# Patient Record
Sex: FEMALE | Race: WHITE | Employment: UNEMPLOYED | URBAN - NONMETROPOLITAN AREA
[De-identification: names, ages, dates, MRNs, and addresses within clinical notes are randomized per-mention and may not be internally consistent; named-entity substitution may affect disease eponyms.]

---

## 2017-12-07 ENCOUNTER — OFFICE VISIT (OUTPATIENT)
Dept: FAMILY MEDICINE CLINIC | Age: 72
End: 2017-12-07

## 2017-12-07 VITALS
BODY MASS INDEX: 23.55 KG/M2 | OXYGEN SATURATION: 92 % | WEIGHT: 128 LBS | HEIGHT: 62 IN | DIASTOLIC BLOOD PRESSURE: 86 MMHG | HEART RATE: 69 BPM | SYSTOLIC BLOOD PRESSURE: 126 MMHG

## 2017-12-07 DIAGNOSIS — E55.9 VITAMIN D DEFICIENCY: ICD-10-CM

## 2017-12-07 DIAGNOSIS — K86.89 PANCREATIC INSUFFICIENCY: ICD-10-CM

## 2017-12-07 DIAGNOSIS — G45.9 TRANSIENT CEREBRAL ISCHEMIA, UNSPECIFIED TYPE: ICD-10-CM

## 2017-12-07 DIAGNOSIS — Z76.0 MEDICATION REFILL: ICD-10-CM

## 2017-12-07 DIAGNOSIS — E53.8 B12 DEFICIENCY: ICD-10-CM

## 2017-12-07 DIAGNOSIS — I10 ESSENTIAL HYPERTENSION: Primary | ICD-10-CM

## 2017-12-07 DIAGNOSIS — K21.9 GASTROESOPHAGEAL REFLUX DISEASE, ESOPHAGITIS PRESENCE NOT SPECIFIED: ICD-10-CM

## 2017-12-07 DIAGNOSIS — H34.8392 BRANCH RETINAL VEIN OCCLUSION: ICD-10-CM

## 2017-12-07 DIAGNOSIS — J44.9 CHRONIC OBSTRUCTIVE PULMONARY DISEASE, UNSPECIFIED COPD TYPE (HCC): ICD-10-CM

## 2017-12-07 DIAGNOSIS — Z23 NEEDS FLU SHOT: ICD-10-CM

## 2017-12-07 DIAGNOSIS — F41.9 ANXIETY: ICD-10-CM

## 2017-12-07 DIAGNOSIS — Z11.59 ENCOUNTER FOR HEPATITIS C SCREENING TEST FOR LOW RISK PATIENT: ICD-10-CM

## 2017-12-07 PROCEDURE — 3014F SCREEN MAMMO DOC REV: CPT | Performed by: FAMILY MEDICINE

## 2017-12-07 PROCEDURE — G8400 PT W/DXA NO RESULTS DOC: HCPCS | Performed by: FAMILY MEDICINE

## 2017-12-07 PROCEDURE — G8484 FLU IMMUNIZE NO ADMIN: HCPCS | Performed by: FAMILY MEDICINE

## 2017-12-07 PROCEDURE — G8510 SCR DEP NEG, NO PLAN REQD: HCPCS | Performed by: FAMILY MEDICINE

## 2017-12-07 PROCEDURE — G8926 SPIRO NO PERF OR DOC: HCPCS | Performed by: FAMILY MEDICINE

## 2017-12-07 PROCEDURE — 4040F PNEUMOC VAC/ADMIN/RCVD: CPT | Performed by: FAMILY MEDICINE

## 2017-12-07 PROCEDURE — 3023F SPIROM DOC REV: CPT | Performed by: FAMILY MEDICINE

## 2017-12-07 PROCEDURE — 1123F ACP DISCUSS/DSCN MKR DOCD: CPT | Performed by: FAMILY MEDICINE

## 2017-12-07 PROCEDURE — G8427 DOCREV CUR MEDS BY ELIG CLIN: HCPCS | Performed by: FAMILY MEDICINE

## 2017-12-07 PROCEDURE — 3017F COLORECTAL CA SCREEN DOC REV: CPT | Performed by: FAMILY MEDICINE

## 2017-12-07 PROCEDURE — 1090F PRES/ABSN URINE INCON ASSESS: CPT | Performed by: FAMILY MEDICINE

## 2017-12-07 PROCEDURE — 4004F PT TOBACCO SCREEN RCVD TLK: CPT | Performed by: FAMILY MEDICINE

## 2017-12-07 PROCEDURE — 99214 OFFICE O/P EST MOD 30 MIN: CPT | Performed by: FAMILY MEDICINE

## 2017-12-07 PROCEDURE — 3288F FALL RISK ASSESSMENT DOCD: CPT | Performed by: FAMILY MEDICINE

## 2017-12-07 PROCEDURE — 90662 IIV NO PRSV INCREASED AG IM: CPT | Performed by: FAMILY MEDICINE

## 2017-12-07 PROCEDURE — G8420 CALC BMI NORM PARAMETERS: HCPCS | Performed by: FAMILY MEDICINE

## 2017-12-07 PROCEDURE — G0008 ADMIN INFLUENZA VIRUS VAC: HCPCS | Performed by: FAMILY MEDICINE

## 2017-12-07 PROCEDURE — 96372 THER/PROPH/DIAG INJ SC/IM: CPT | Performed by: FAMILY MEDICINE

## 2017-12-07 RX ORDER — ALBUTEROL SULFATE 90 UG/1
2 AEROSOL, METERED RESPIRATORY (INHALATION) EVERY 6 HOURS PRN
Qty: 3 INHALER | Refills: 3 | Status: SHIPPED | OUTPATIENT
Start: 2017-12-07 | End: 2018-10-25 | Stop reason: SDUPTHER

## 2017-12-07 RX ORDER — PANTOPRAZOLE SODIUM 40 MG/1
40 TABLET, DELAYED RELEASE ORAL DAILY
Qty: 90 TABLET | Refills: 3 | Status: SHIPPED | OUTPATIENT
Start: 2017-12-07 | End: 2018-10-25 | Stop reason: SDUPTHER

## 2017-12-07 RX ORDER — ALBUTEROL SULFATE 2.5 MG/3ML
2.5 SOLUTION RESPIRATORY (INHALATION) EVERY 4 HOURS PRN
Qty: 120 EACH | Refills: 3 | Status: SHIPPED | OUTPATIENT
Start: 2017-12-07 | End: 2018-10-25 | Stop reason: SDUPTHER

## 2017-12-07 RX ORDER — CLOPIDOGREL BISULFATE 75 MG/1
75 TABLET ORAL DAILY
Qty: 90 TABLET | Refills: 3 | Status: SHIPPED | OUTPATIENT
Start: 2017-12-07 | End: 2018-10-25 | Stop reason: SDUPTHER

## 2017-12-07 RX ORDER — PROPRANOLOL HYDROCHLORIDE 40 MG/1
40 TABLET ORAL 2 TIMES DAILY
Qty: 180 TABLET | Refills: 3 | Status: SHIPPED | OUTPATIENT
Start: 2017-12-07 | End: 2018-10-25 | Stop reason: SDUPTHER

## 2017-12-07 RX ORDER — CYANOCOBALAMIN 1000 UG/ML
1000 INJECTION INTRAMUSCULAR; SUBCUTANEOUS ONCE
Status: COMPLETED | OUTPATIENT
Start: 2017-12-07 | End: 2017-12-07

## 2017-12-07 RX ORDER — SERTRALINE HYDROCHLORIDE 25 MG/1
25 TABLET, FILM COATED ORAL DAILY
Qty: 90 TABLET | Refills: 3 | Status: SHIPPED | OUTPATIENT
Start: 2017-12-07 | End: 2018-10-25 | Stop reason: SDUPTHER

## 2017-12-07 RX ADMIN — CYANOCOBALAMIN 1000 MCG: 1000 INJECTION INTRAMUSCULAR; SUBCUTANEOUS at 11:17

## 2017-12-07 ASSESSMENT — PATIENT HEALTH QUESTIONNAIRE - PHQ9
2. FEELING DOWN, DEPRESSED OR HOPELESS: 1
SUM OF ALL RESPONSES TO PHQ QUESTIONS 1-9: 1
1. LITTLE INTEREST OR PLEASURE IN DOING THINGS: 0
SUM OF ALL RESPONSES TO PHQ9 QUESTIONS 1 & 2: 1

## 2017-12-07 NOTE — PROGRESS NOTES
Chief Complaint   Patient presents with    Establish Care       HPI:  Claudean Cockayne is a 71 y.o. female     Establish  Problem list as below  Last seen about a year ago with Dr. Kirby Mike  Requests flu shot    Compliant with meds for HTN    GERD, on protonix    COPD, uses albuterol    plavix for reported hx of TIA    She had ileojejunal bypass in 1978  She drinks ensure    Not eating all that well    Has lost 30 pounds since last year    Patient Active Problem List   Diagnosis    HTN (hypertension)    Branch retinal vein occlusion    Cortical senile cataract    Nonexudative age-related macular degeneration    Nuclear senile cataract    GERD (gastroesophageal reflux disease)    Chronic obstructive pulmonary disease ([facility] [address]    H/O chronic pancreatitis       Current Outpatient Prescriptions   Medication Sig Dispense Refill    pantoprazole (PROTONIX) 40 MG tablet Take 1 tablet by mouth daily 90 tablet 3    clopidogrel (PLAVIX) 75 MG tablet Take 1 tablet by mouth daily 90 tablet 3    propranolol (INDERAL) 40 MG tablet Take 1 tablet by mouth 2 times daily 180 tablet 3    lipase-protease-amylase (CREON) 51661-83156 units delayed release capsule Take 1 capsule by mouth 2 times daily 180 capsule 3    albuterol (PROVENTIL) (2.5 MG/3ML) 0.083% nebulizer solution Take 3 mLs by nebulization every 4 hours as needed for Wheezing 120 each 3    albuterol sulfate HFA (VENTOLIN HFA) 108 (90 Base) MCG/ACT inhaler Inhale 2 puffs into the lungs every 6 hours as needed for Wheezing 3 Inhaler 3    sertraline (ZOLOFT) 25 MG tablet Take 1 tablet by mouth daily 90 tablet 3    Magnesium Oxide 400 MG CAPS Take 400 mg by mouth 2 times daily 180 capsule 3     Current Facility-Administered Medications   Medication Dose Route Frequency Provider Last Rate Last Dose    cyanocobalamin injection 1,000 mcg  1,000 mcg Intramuscular Once Sherri Diana MD             Past Medical History:   Diagnosis Date    Bowel disease     Cataract     Chronic Oriented x 3   HEENT: EOMI, eyes clear, MMM  Skin: without rash or jaundice  Neck: no significant lymphadenopathy or thyromegaly  Lungs: CTA B w/out Rales/Wheezes/Rhonchi, Good respiratory effort   Heart: RRR, S1S2, w/out M/R/G, non-displaced PMI   Abdomen: Soft NT/ND, w/out R/G, w/ +BSx4   Ext: No C/C/E Bilaterally. Neuro: Neurovascularly intact w/ Sensory/Motor intact UE/LE Bilaterally. Lab Results   Component Value Date    WBC 7.9 11/11/2014    HGB 13.9 11/11/2014    HCT 42.2 11/11/2014     11/11/2014    CHOL 137 10/04/2014    TRIG 249 (H) 10/04/2014    HDL 27 (L) 10/04/2014    ALT 23 11/11/2014    AST 20 11/11/2014     05/23/2016    K 4.4 05/23/2016     05/23/2016    CREATININE 0.86 05/23/2016    BUN 23 05/23/2016    CO2 19 (L) 05/23/2016    TSH 2.520 05/23/2016    INR 1.1 10/04/2014         A&P  1. Essential hypertension  CBC    Comprehensive Metabolic Panel    Lipid Panel    propranolol (INDERAL) 40 MG tablet   2. Chronic obstructive pulmonary disease, unspecified COPD type (HCC)  albuterol (PROVENTIL) (2.5 MG/3ML) 0.083% nebulizer solution    albuterol sulfate HFA (VENTOLIN HFA) 108 (90 Base) MCG/ACT inhaler   3. Needs flu shot  INFLUENZA, HIGH DOSE, 65 YRS +, IM, PF, PREFILL SYR, 0.5ML (FLUZONE HD)   4. Gastroesophageal reflux disease, esophagitis presence not specified  pantoprazole (PROTONIX) 40 MG tablet   5. Branch retinal vein occlusion     6. Medication refill  pantoprazole (PROTONIX) 40 MG tablet    clopidogrel (PLAVIX) 75 MG tablet    propranolol (INDERAL) 40 MG tablet   7. Anxiety  sertraline (ZOLOFT) 25 MG tablet    TSH without Reflex   8. Encounter for hepatitis C screening test for low risk patient  Hepatitis C Antibody   9. Pancreatic insufficiency  lipase-protease-amylase (CREON) 94233-25922 units delayed release capsule   10. Transient cerebral ischemia, unspecified type  clopidogrel (PLAVIX) 75 MG tablet   11. Vitamin D deficiency  Vitamin D 25 Hydroxy   12.  B12

## 2017-12-08 DIAGNOSIS — F41.9 ANXIETY: ICD-10-CM

## 2017-12-08 DIAGNOSIS — Z11.59 ENCOUNTER FOR HEPATITIS C SCREENING TEST FOR LOW RISK PATIENT: ICD-10-CM

## 2017-12-08 DIAGNOSIS — I10 ESSENTIAL HYPERTENSION: ICD-10-CM

## 2017-12-08 DIAGNOSIS — E53.8 B12 DEFICIENCY: ICD-10-CM

## 2017-12-08 DIAGNOSIS — E55.9 VITAMIN D DEFICIENCY: ICD-10-CM

## 2017-12-08 LAB
ALBUMIN SERPL-MCNC: 4 G/DL (ref 3.9–4.9)
ALP BLD-CCNC: 76 U/L (ref 40–130)
ALT SERPL-CCNC: 11 U/L (ref 0–33)
ANION GAP SERPL CALCULATED.3IONS-SCNC: 18 MEQ/L (ref 7–13)
AST SERPL-CCNC: 14 U/L (ref 0–35)
BILIRUB SERPL-MCNC: 0.4 MG/DL (ref 0–1.2)
BUN BLDV-MCNC: 21 MG/DL (ref 8–23)
CALCIUM SERPL-MCNC: 7.7 MG/DL (ref 8.6–10.2)
CHLORIDE BLD-SCNC: 110 MEQ/L (ref 98–107)
CHOLESTEROL, TOTAL: 118 MG/DL (ref 0–199)
CO2: 20 MEQ/L (ref 22–29)
CREAT SERPL-MCNC: 0.91 MG/DL (ref 0.5–0.9)
GFR AFRICAN AMERICAN: >60
GFR NON-AFRICAN AMERICAN: >60
GLOBULIN: 2.7 G/DL (ref 2.3–3.5)
GLUCOSE BLD-MCNC: 81 MG/DL (ref 74–109)
HCT VFR BLD CALC: 43.8 % (ref 37–47)
HDLC SERPL-MCNC: 26 MG/DL (ref 40–59)
HEMOGLOBIN: 14.6 G/DL (ref 12–16)
HEPATITIS C ANTIBODY INTERPRETATION: NORMAL
LDL CHOLESTEROL CALCULATED: 45 MG/DL (ref 0–129)
MCH RBC QN AUTO: 33.8 PG (ref 27–31.3)
MCHC RBC AUTO-ENTMCNC: 33.3 % (ref 33–37)
MCV RBC AUTO: 101.7 FL (ref 82–100)
PDW BLD-RTO: 13.9 % (ref 11.5–14.5)
PLATELET # BLD: 155 K/UL (ref 130–400)
POTASSIUM SERPL-SCNC: 3.4 MEQ/L (ref 3.5–5.1)
RBC # BLD: 4.3 M/UL (ref 4.2–5.4)
SODIUM BLD-SCNC: 148 MEQ/L (ref 132–144)
TOTAL PROTEIN: 6.7 G/DL (ref 6.4–8.1)
TRIGL SERPL-MCNC: 235 MG/DL (ref 0–200)
TSH SERPL DL<=0.05 MIU/L-ACNC: 2.9 UIU/ML (ref 0.27–4.2)
VITAMIN B-12: >2000 PG/ML (ref 211–946)
VITAMIN D 25-HYDROXY: 26.7 NG/ML (ref 30–100)
WBC # BLD: 6.7 K/UL (ref 4.8–10.8)

## 2018-03-08 ENCOUNTER — OFFICE VISIT (OUTPATIENT)
Dept: FAMILY MEDICINE CLINIC | Age: 73
End: 2018-03-08
Payer: COMMERCIAL

## 2018-03-08 VITALS
SYSTOLIC BLOOD PRESSURE: 128 MMHG | BODY MASS INDEX: 22.93 KG/M2 | HEIGHT: 62 IN | HEART RATE: 67 BPM | DIASTOLIC BLOOD PRESSURE: 80 MMHG | OXYGEN SATURATION: 98 % | WEIGHT: 124.6 LBS

## 2018-03-08 DIAGNOSIS — Z98.0 INTESTINAL BYPASS OR ANASTOMOSIS STATUS: ICD-10-CM

## 2018-03-08 DIAGNOSIS — E87.6 HYPOKALEMIA: ICD-10-CM

## 2018-03-08 DIAGNOSIS — I10 ESSENTIAL HYPERTENSION: ICD-10-CM

## 2018-03-08 DIAGNOSIS — J44.9 CHRONIC OBSTRUCTIVE PULMONARY DISEASE, UNSPECIFIED COPD TYPE (HCC): Primary | ICD-10-CM

## 2018-03-08 DIAGNOSIS — K21.9 GASTROESOPHAGEAL REFLUX DISEASE, ESOPHAGITIS PRESENCE NOT SPECIFIED: ICD-10-CM

## 2018-03-08 DIAGNOSIS — L30.9 ECZEMA, UNSPECIFIED TYPE: ICD-10-CM

## 2018-03-08 DIAGNOSIS — Z87.19 H/O CHRONIC PANCREATITIS: ICD-10-CM

## 2018-03-08 DIAGNOSIS — E53.8 B12 DEFICIENCY: ICD-10-CM

## 2018-03-08 LAB
ANION GAP SERPL CALCULATED.3IONS-SCNC: 20 MEQ/L (ref 7–13)
BUN BLDV-MCNC: 20 MG/DL (ref 8–23)
CALCIUM SERPL-MCNC: 7 MG/DL (ref 8.6–10.2)
CHLORIDE BLD-SCNC: 107 MEQ/L (ref 98–107)
CO2: 19 MEQ/L (ref 22–29)
CREAT SERPL-MCNC: 1 MG/DL (ref 0.5–0.9)
GFR AFRICAN AMERICAN: >60
GFR NON-AFRICAN AMERICAN: 54.5
GLUCOSE BLD-MCNC: 83 MG/DL (ref 74–109)
MAGNESIUM: 0.6 MG/DL (ref 1.7–2.3)
POTASSIUM SERPL-SCNC: 3.5 MEQ/L (ref 3.5–5.1)
SODIUM BLD-SCNC: 146 MEQ/L (ref 132–144)

## 2018-03-08 PROCEDURE — 99213 OFFICE O/P EST LOW 20 MIN: CPT | Performed by: FAMILY MEDICINE

## 2018-03-08 PROCEDURE — 96372 THER/PROPH/DIAG INJ SC/IM: CPT | Performed by: FAMILY MEDICINE

## 2018-03-08 RX ORDER — CYANOCOBALAMIN 1000 UG/ML
1000 INJECTION INTRAMUSCULAR; SUBCUTANEOUS ONCE
Status: COMPLETED | OUTPATIENT
Start: 2018-03-08 | End: 2018-03-08

## 2018-03-08 RX ORDER — CLOTRIMAZOLE AND BETAMETHASONE DIPROPIONATE 10; .64 MG/G; MG/G
CREAM TOPICAL
Qty: 45 G | Refills: 5 | Status: SHIPPED | OUTPATIENT
Start: 2018-03-08 | End: 2018-10-25 | Stop reason: SDUPTHER

## 2018-03-08 RX ORDER — METRONIDAZOLE 500 MG/1
TABLET ORAL
Qty: 90 TABLET | Refills: 2 | Status: SHIPPED | OUTPATIENT
Start: 2018-03-08 | End: 2018-06-26 | Stop reason: SDUPTHER

## 2018-03-08 RX ORDER — METRONIDAZOLE 500 MG/1
500 TABLET ORAL 3 TIMES DAILY
Refills: 0 | Status: CANCELLED | OUTPATIENT
Start: 2018-03-08 | End: 2018-03-18

## 2018-03-08 RX ADMIN — CYANOCOBALAMIN 1000 MCG: 1000 INJECTION INTRAMUSCULAR; SUBCUTANEOUS at 14:39

## 2018-03-08 NOTE — PROGRESS NOTES
Chief Complaint   Patient presents with    Hypertension     check up       HPI:  Trav Padilla is a 71 y.o. female     Follow up  Problem list as below    Compliant with meds for HTN    GERD, on protonix  COPD, uses albuterol    plavix for reported hx of TIA    She had ileojejunal bypass in 1978  She drinks ensure    Wt Readings from Last 3 Encounters:   03/08/18 124 lb 9.6 oz (56.5 kg)   12/07/17 128 lb (58.1 kg)   12/07/16 156 lb 3.2 oz (70.9 kg)         Patient Active Problem List   Diagnosis    HTN (hypertension)    Branch retinal vein occlusion    Cortical senile cataract    Nonexudative age-related macular degeneration    Nuclear senile cataract    GERD (gastroesophageal reflux disease)    Chronic obstructive pulmonary disease ([facility] [address]    H/O chronic pancreatitis       Current Outpatient Prescriptions   Medication Sig Dispense Refill    clotrimazole-betamethasone (LOTRISONE) 1-0.05 % cream Apply topically 2 times daily.  45 g 5    metroNIDAZOLE (FLAGYL) 500 MG tablet 1 tab tid for 10 days per month 90 tablet 2    pantoprazole (PROTONIX) 40 MG tablet Take 1 tablet by mouth daily 90 tablet 3    clopidogrel (PLAVIX) 75 MG tablet Take 1 tablet by mouth daily 90 tablet 3    propranolol (INDERAL) 40 MG tablet Take 1 tablet by mouth 2 times daily 180 tablet 3    lipase-protease-amylase (CREON) 67427-88205 units delayed release capsule Take 1 capsule by mouth 2 times daily 180 capsule 3    albuterol (PROVENTIL) (2.5 MG/3ML) 0.083% nebulizer solution Take 3 mLs by nebulization every 4 hours as needed for Wheezing 120 each 3    albuterol sulfate HFA (VENTOLIN HFA) 108 (90 Base) MCG/ACT inhaler Inhale 2 puffs into the lungs every 6 hours as needed for Wheezing 3 Inhaler 3    sertraline (ZOLOFT) 25 MG tablet Take 1 tablet by mouth daily 90 tablet 3    Magnesium Oxide 400 MG CAPS Take 400 mg by mouth 2 times daily 180 capsule 3     Current Facility-Administered Medications   Medication Dose Route Frequency Provider Last Rate Last Dose    cyanocobalamin injection 1,000 mcg  1,000 mcg Intramuscular Once Sinan Crawford MD             Past Medical History:   Diagnosis Date    Bowel disease     Cataract     Chronic back pain     COPD (chronic obstructive pulmonary disease) (HCC)     Dizziness and giddiness     GERD (gastroesophageal reflux disease)     Glaucoma     Hypertension     Osteoarthritis     Pancreatitis     Seizures (HCC)     TIA (transient ischemic attack)      Past Surgical History:   Procedure Laterality Date    APPENDECTOMY      CHOLECYSTECTOMY      INTESTINAL BYPASS      jejunoileal     Family History   Problem Relation Age of Onset    Arthritis Other     Heart Disease Other     High Blood Pressure Other     Kidney Disease Other     Other Other      respiratory problems     Social History     Social History    Marital status:      Spouse name: N/A    Number of children: N/A    Years of education: N/A     Social History Main Topics    Smoking status: Current Every Day Smoker     Years: 54.00     Types: Cigarettes    Smokeless tobacco: Never Used    Alcohol use No    Drug use: No    Sexual activity: Yes     Partners: Male      Comment:      Other Topics Concern    None     Social History Narrative    None     Allergies   Allergen Reactions    Aspirin      Patient states not allergic to    Penicillins      Unable to take    Adhesive Tape Rash     Patient states Silk Tape works the best       Review of Systems:   General ROS: fatigue, but then doesn't sleep at night  Respiratory ROS: no cough, shortness of breath, or wheezing  Cardiovascular ROS: no chest pain or dyspnea on exertion  Gastrointestinal ROS: hx of ileojejunal bypass  Genito-Urinary ROS: no dysuria, trouble voiding  Musculoskeletal ROS: some pain in her lower legs  Neurological ROS: negative for - behavioral changes, memory loss, numbness/tingling, tremors or weakness    In general patient otherwise 12/07/16 156 lb 3.2 oz (70.9 kg)         Broderick Marino MD

## 2018-03-09 ENCOUNTER — TELEPHONE (OUTPATIENT)
Dept: FAMILY MEDICINE CLINIC | Age: 73
End: 2018-03-09

## 2018-03-09 RX ORDER — CALCIUM CARBONATE/VITAMIN D3 500-10/5ML
400 LIQUID (ML) ORAL 2 TIMES DAILY
Qty: 180 CAPSULE | Refills: 3 | Status: SHIPPED | OUTPATIENT
Start: 2018-03-09 | End: 2018-10-25 | Stop reason: SDUPTHER

## 2018-06-26 ENCOUNTER — OFFICE VISIT (OUTPATIENT)
Dept: FAMILY MEDICINE CLINIC | Age: 73
End: 2018-06-26
Payer: COMMERCIAL

## 2018-06-26 ENCOUNTER — TELEPHONE (OUTPATIENT)
Dept: FAMILY MEDICINE CLINIC | Age: 73
End: 2018-06-26

## 2018-06-26 VITALS
DIASTOLIC BLOOD PRESSURE: 82 MMHG | HEART RATE: 67 BPM | HEIGHT: 62 IN | SYSTOLIC BLOOD PRESSURE: 138 MMHG | TEMPERATURE: 98 F | OXYGEN SATURATION: 98 % | BODY MASS INDEX: 21.16 KG/M2 | WEIGHT: 115 LBS

## 2018-06-26 DIAGNOSIS — E53.8 B12 DEFICIENCY: ICD-10-CM

## 2018-06-26 DIAGNOSIS — Z98.0 INTESTINAL BYPASS OR ANASTOMOSIS STATUS: Primary | ICD-10-CM

## 2018-06-26 DIAGNOSIS — K21.9 GASTROESOPHAGEAL REFLUX DISEASE, ESOPHAGITIS PRESENCE NOT SPECIFIED: ICD-10-CM

## 2018-06-26 DIAGNOSIS — I10 ESSENTIAL HYPERTENSION: ICD-10-CM

## 2018-06-26 DIAGNOSIS — J44.9 CHRONIC OBSTRUCTIVE PULMONARY DISEASE, UNSPECIFIED COPD TYPE (HCC): Primary | ICD-10-CM

## 2018-06-26 DIAGNOSIS — E83.51 HYPOCALCEMIA: ICD-10-CM

## 2018-06-26 DIAGNOSIS — E83.42 HYPOMAGNESEMIA: ICD-10-CM

## 2018-06-26 DIAGNOSIS — R63.4 WEIGHT LOSS: ICD-10-CM

## 2018-06-26 DIAGNOSIS — Z87.19 H/O CHRONIC PANCREATITIS: ICD-10-CM

## 2018-06-26 DIAGNOSIS — E55.9 VITAMIN D DEFICIENCY: ICD-10-CM

## 2018-06-26 DIAGNOSIS — Z98.0 INTESTINAL BYPASS OR ANASTOMOSIS STATUS: ICD-10-CM

## 2018-06-26 LAB
ALBUMIN SERPL-MCNC: 4 G/DL (ref 3.9–4.9)
ALP BLD-CCNC: 66 U/L (ref 40–130)
ALT SERPL-CCNC: 10 U/L (ref 0–33)
ANION GAP SERPL CALCULATED.3IONS-SCNC: 18 MEQ/L (ref 7–13)
AST SERPL-CCNC: 19 U/L (ref 0–35)
BILIRUB SERPL-MCNC: <0.2 MG/DL (ref 0–1.2)
BUN BLDV-MCNC: 25 MG/DL (ref 8–23)
CALCIUM SERPL-MCNC: 8.1 MG/DL (ref 8.6–10.2)
CHLORIDE BLD-SCNC: 106 MEQ/L (ref 98–107)
CO2: 19 MEQ/L (ref 22–29)
CREAT SERPL-MCNC: 0.84 MG/DL (ref 0.5–0.9)
GFR AFRICAN AMERICAN: >60
GFR NON-AFRICAN AMERICAN: >60
GLOBULIN: 3 G/DL (ref 2.3–3.5)
GLUCOSE BLD-MCNC: 79 MG/DL (ref 74–109)
HCT VFR BLD CALC: 41.3 % (ref 37–47)
HEMOGLOBIN: 14.1 G/DL (ref 12–16)
MAGNESIUM: 0.9 MG/DL (ref 1.7–2.3)
MCH RBC QN AUTO: 34.8 PG (ref 27–31.3)
MCHC RBC AUTO-ENTMCNC: 34.2 % (ref 33–37)
MCV RBC AUTO: 101.7 FL (ref 82–100)
PDW BLD-RTO: 13.6 % (ref 11.5–14.5)
PLATELET # BLD: 150 K/UL (ref 130–400)
POTASSIUM SERPL-SCNC: 4.1 MEQ/L (ref 3.5–5.1)
RBC # BLD: 4.06 M/UL (ref 4.2–5.4)
SODIUM BLD-SCNC: 143 MEQ/L (ref 132–144)
TOTAL PROTEIN: 7 G/DL (ref 6.4–8.1)
TSH SERPL DL<=0.05 MIU/L-ACNC: 2.71 UIU/ML (ref 0.27–4.2)
VITAMIN B-12: >2000 PG/ML (ref 232–1245)
WBC # BLD: 7 K/UL (ref 4.8–10.8)

## 2018-06-26 PROCEDURE — 96372 THER/PROPH/DIAG INJ SC/IM: CPT | Performed by: FAMILY MEDICINE

## 2018-06-26 PROCEDURE — 99213 OFFICE O/P EST LOW 20 MIN: CPT | Performed by: FAMILY MEDICINE

## 2018-06-26 RX ORDER — CYANOCOBALAMIN 1000 UG/ML
1000 INJECTION INTRAMUSCULAR; SUBCUTANEOUS ONCE
Status: COMPLETED | OUTPATIENT
Start: 2018-06-26 | End: 2018-06-26

## 2018-06-26 RX ORDER — METRONIDAZOLE 500 MG/1
TABLET ORAL
Qty: 90 TABLET | Refills: 2 | Status: SHIPPED | OUTPATIENT
Start: 2018-06-26 | End: 2018-10-25 | Stop reason: SDUPTHER

## 2018-06-26 RX ADMIN — CYANOCOBALAMIN 1000 MCG: 1000 INJECTION INTRAMUSCULAR; SUBCUTANEOUS at 14:36

## 2018-10-17 ENCOUNTER — OFFICE VISIT (OUTPATIENT)
Dept: FAMILY MEDICINE CLINIC | Age: 73
End: 2018-10-17
Payer: COMMERCIAL

## 2018-10-17 VITALS
WEIGHT: 114.4 LBS | TEMPERATURE: 97.6 F | HEIGHT: 63 IN | DIASTOLIC BLOOD PRESSURE: 70 MMHG | HEART RATE: 77 BPM | BODY MASS INDEX: 20.27 KG/M2 | SYSTOLIC BLOOD PRESSURE: 138 MMHG | OXYGEN SATURATION: 97 %

## 2018-10-17 DIAGNOSIS — R79.89 HIGH SERUM VITAMIN B12: ICD-10-CM

## 2018-10-17 DIAGNOSIS — Z98.0 INTESTINAL BYPASS OR ANASTOMOSIS STATUS: ICD-10-CM

## 2018-10-17 DIAGNOSIS — R53.81 MALAISE AND FATIGUE: ICD-10-CM

## 2018-10-17 DIAGNOSIS — R79.0 LOW MAGNESIUM LEVEL: Primary | ICD-10-CM

## 2018-10-17 DIAGNOSIS — H92.01 RIGHT EAR PAIN: ICD-10-CM

## 2018-10-17 DIAGNOSIS — E55.9 VITAMIN D DEFICIENCY: ICD-10-CM

## 2018-10-17 DIAGNOSIS — R10.84 GENERALIZED ABDOMINAL PAIN: ICD-10-CM

## 2018-10-17 DIAGNOSIS — R53.83 MALAISE AND FATIGUE: ICD-10-CM

## 2018-10-17 DIAGNOSIS — R20.0 NUMBNESS OF EXTREMITY: ICD-10-CM

## 2018-10-17 DIAGNOSIS — Z23 NEEDS FLU SHOT: ICD-10-CM

## 2018-10-17 DIAGNOSIS — E83.51 HYPOCALCEMIA: ICD-10-CM

## 2018-10-17 LAB
ALBUMIN SERPL-MCNC: 3.8 G/DL (ref 3.9–4.9)
ALP BLD-CCNC: 68 U/L (ref 40–130)
ALT SERPL-CCNC: 8 U/L (ref 0–33)
ANION GAP SERPL CALCULATED.3IONS-SCNC: 18 MEQ/L (ref 7–13)
AST SERPL-CCNC: 14 U/L (ref 0–35)
BILIRUB SERPL-MCNC: <0.2 MG/DL (ref 0–1.2)
BUN BLDV-MCNC: 21 MG/DL (ref 8–23)
CALCIUM SERPL-MCNC: 6.4 MG/DL (ref 8.6–10.2)
CHLORIDE BLD-SCNC: 106 MEQ/L (ref 98–107)
CO2: 20 MEQ/L (ref 22–29)
CREAT SERPL-MCNC: 0.9 MG/DL (ref 0.5–0.9)
GFR AFRICAN AMERICAN: >60
GFR NON-AFRICAN AMERICAN: >60
GLOBULIN: 3.2 G/DL (ref 2.3–3.5)
GLUCOSE BLD-MCNC: 102 MG/DL (ref 74–109)
MAGNESIUM: 0.5 MG/DL (ref 1.7–2.3)
PARATHYROID HORMONE INTACT: 29.9 PG/ML (ref 15–65)
POTASSIUM SERPL-SCNC: 3.9 MEQ/L (ref 3.5–5.1)
SODIUM BLD-SCNC: 144 MEQ/L (ref 132–144)
TOTAL PROTEIN: 7 G/DL (ref 6.4–8.1)
VITAMIN B-12: 402 PG/ML (ref 232–1245)
VITAMIN D 25-HYDROXY: 35.9 NG/ML (ref 30–100)

## 2018-10-17 PROCEDURE — 90662 IIV NO PRSV INCREASED AG IM: CPT | Performed by: NURSE PRACTITIONER

## 2018-10-17 PROCEDURE — 90471 IMMUNIZATION ADMIN: CPT | Performed by: NURSE PRACTITIONER

## 2018-10-17 PROCEDURE — 99213 OFFICE O/P EST LOW 20 MIN: CPT | Performed by: NURSE PRACTITIONER

## 2018-10-17 NOTE — PROGRESS NOTES
Subjective:     Patient ID: Boaz العلي is a 68 y.o. female who presentstoday for:  Chief Complaint   Patient presents with    Otalgia     last week, pt states that she had Cortisporin ear drops from 2015 and would like more? helping with ear     GI Problem     last week, pt states that she stopped caltrate and believes that is what caused it? stopped taking about 4 days ago. pt states that there is HX of stomach bypass for weight lose     Numbness     pt states struggling with hand and feet numbness about 2 days. pt states over all not feeling well, there is HX seizers, HX of labs being elevated and/or too low,      HPI  Patient states that she is having tingling in her hands and feet for the past 2 days. Patient did have  Severely low Magnesium level in June 2018. She states that she has stopped taking the Magnesium supplement and has not repeated the Magnesium levels since June. Otalgia    There is pain in the right ear. This is a new problem. The current episode started in the past 7 days. The problem occurs constantly. The problem has been unchanged. There has been no fever. The pain is mild. Associated symptoms include abdominal pain. Pertinent negatives include no coughing, diarrhea, ear discharge, headaches, hearing loss, neck pain, rash, rhinorrhea, sore throat or vomiting. She has tried ear drops for the symptoms. The treatment provided mild relief. Abdominal Pain   This is a new problem. The current episode started in the past 7 days. The onset quality is undetermined. The problem occurs daily. The problem has been unchanged. The pain is located in the generalized abdominal region. The pain is mild. The quality of the pain is aching. The abdominal pain does not radiate. Associated symptoms include myalgias and nausea.  Pertinent negatives include no anorexia, arthralgias, belching, constipation, diarrhea, dysuria, fever, flatus, frequency, headaches, hematochezia, hematuria, melena, vomiting or Diagnosis Orders   1. Low magnesium level     2. High serum vitamin B12  Vitamin B12   3. Numbness of extremity  Comprehensive Metabolic Panel   4. Generalized abdominal pain     5. Right ear pain     6. Malaise and fatigue     7. Needs flu shot  Influenza, High Dose, 65 yrs  and older, IM, PF, Prefill Syr, 0.5mL (FLUZONE HD)     Orders Placed This Encounter   Procedures    Influenza, High Dose, 65 yrs  and older, IM, PF, Prefill Syr, 0.5mL (FLUZONE HD)    Comprehensive Metabolic Panel     Standing Status:   Future     Number of Occurrences:   1     Standing Expiration Date:   10/17/2019    Vitamin B12     Standing Status:   Future     Number of Occurrences:   1     Standing Expiration Date:   10/17/2019     Return if symptoms worsen or fail to improve, for follow up with PCP. All symptoms likely related to severely low Magnesium level. Patient instructed to take Magnesium 400 mg TID. Will call with lab results. Follow up with PCP. Reviewed with the patient: currentclinical status, medications, activities and diet. Side effects, adverse effects of the medicationprescribed today, as well as treatment plan/ rationale and result expectations havebeen discussed with the patient who expresses understanding and desires to proceed. Pt instructions reviewed and given to patient.     Close follow up to evaluate treatment resultsand for coordination of care. I have reviewed the patient's medical historyin detail and updated the computerized patient record.     CHEVY Morales - CNP

## 2018-10-24 ASSESSMENT — ENCOUNTER SYMPTOMS
ABDOMINAL DISTENTION: 0
BELCHING: 0
BLOOD IN STOOL: 0
ANAL BLEEDING: 0
BACK PAIN: 0
ABDOMINAL PAIN: 1
RHINORRHEA: 0
SHORTNESS OF BREATH: 0
RESPIRATORY NEGATIVE: 1
WHEEZING: 0
SORE THROAT: 0
NAUSEA: 1
VOMITING: 0
FLATUS: 0
COUGH: 0
DIARRHEA: 0
CONSTIPATION: 0
HEMATOCHEZIA: 0
CHEST TIGHTNESS: 0

## 2018-10-25 ENCOUNTER — OFFICE VISIT (OUTPATIENT)
Dept: FAMILY MEDICINE CLINIC | Age: 73
End: 2018-10-25
Payer: COMMERCIAL

## 2018-10-25 VITALS
WEIGHT: 115.2 LBS | HEIGHT: 63 IN | TEMPERATURE: 98.5 F | DIASTOLIC BLOOD PRESSURE: 82 MMHG | OXYGEN SATURATION: 98 % | SYSTOLIC BLOOD PRESSURE: 136 MMHG | BODY MASS INDEX: 20.41 KG/M2 | HEART RATE: 81 BPM

## 2018-10-25 DIAGNOSIS — L30.9 ECZEMA, UNSPECIFIED TYPE: ICD-10-CM

## 2018-10-25 DIAGNOSIS — E53.8 B12 DEFICIENCY: ICD-10-CM

## 2018-10-25 DIAGNOSIS — Z98.0 INTESTINAL BYPASS OR ANASTOMOSIS STATUS: ICD-10-CM

## 2018-10-25 DIAGNOSIS — E83.42 HYPOMAGNESEMIA: Primary | ICD-10-CM

## 2018-10-25 DIAGNOSIS — E83.42 HYPOMAGNESEMIA: ICD-10-CM

## 2018-10-25 DIAGNOSIS — K21.9 GASTROESOPHAGEAL REFLUX DISEASE, ESOPHAGITIS PRESENCE NOT SPECIFIED: ICD-10-CM

## 2018-10-25 DIAGNOSIS — J44.9 CHRONIC OBSTRUCTIVE PULMONARY DISEASE, UNSPECIFIED COPD TYPE (HCC): ICD-10-CM

## 2018-10-25 DIAGNOSIS — Z76.0 MEDICATION REFILL: ICD-10-CM

## 2018-10-25 DIAGNOSIS — F41.9 ANXIETY: ICD-10-CM

## 2018-10-25 DIAGNOSIS — K86.89 PANCREATIC INSUFFICIENCY: ICD-10-CM

## 2018-10-25 DIAGNOSIS — G45.9 TRANSIENT CEREBRAL ISCHEMIA, UNSPECIFIED TYPE: ICD-10-CM

## 2018-10-25 DIAGNOSIS — I10 ESSENTIAL HYPERTENSION: ICD-10-CM

## 2018-10-25 LAB
ANION GAP SERPL CALCULATED.3IONS-SCNC: 15 MEQ/L (ref 7–13)
BUN BLDV-MCNC: 21 MG/DL (ref 8–23)
CALCIUM SERPL-MCNC: 8.8 MG/DL (ref 8.6–10.2)
CHLORIDE BLD-SCNC: 106 MEQ/L (ref 98–107)
CO2: 22 MEQ/L (ref 22–29)
CREAT SERPL-MCNC: 0.81 MG/DL (ref 0.5–0.9)
GFR AFRICAN AMERICAN: >60
GFR NON-AFRICAN AMERICAN: >60
GLUCOSE BLD-MCNC: 82 MG/DL (ref 74–109)
MAGNESIUM: 1 MG/DL (ref 1.7–2.3)
POTASSIUM SERPL-SCNC: 3.8 MEQ/L (ref 3.5–5.1)
SODIUM BLD-SCNC: 143 MEQ/L (ref 132–144)

## 2018-10-25 PROCEDURE — 96372 THER/PROPH/DIAG INJ SC/IM: CPT | Performed by: FAMILY MEDICINE

## 2018-10-25 PROCEDURE — 99213 OFFICE O/P EST LOW 20 MIN: CPT | Performed by: FAMILY MEDICINE

## 2018-10-25 RX ORDER — CALCIUM CARBONATE/VITAMIN D3 500-10/5ML
400 LIQUID (ML) ORAL 2 TIMES DAILY
Qty: 180 CAPSULE | Refills: 3 | Status: SHIPPED | OUTPATIENT
Start: 2018-10-25 | End: 2019-10-18 | Stop reason: SDUPTHER

## 2018-10-25 RX ORDER — ALBUTEROL SULFATE 90 UG/1
2 AEROSOL, METERED RESPIRATORY (INHALATION) EVERY 6 HOURS PRN
Qty: 3 INHALER | Refills: 3 | Status: SHIPPED | OUTPATIENT
Start: 2018-10-25 | End: 2019-10-23 | Stop reason: SDUPTHER

## 2018-10-25 RX ORDER — PROPRANOLOL HYDROCHLORIDE 40 MG/1
40 TABLET ORAL 2 TIMES DAILY
Qty: 180 TABLET | Refills: 3 | Status: SHIPPED | OUTPATIENT
Start: 2018-10-25 | End: 2019-10-18 | Stop reason: SDUPTHER

## 2018-10-25 RX ORDER — PANTOPRAZOLE SODIUM 40 MG/1
40 TABLET, DELAYED RELEASE ORAL DAILY
Qty: 90 TABLET | Refills: 3 | Status: SHIPPED | OUTPATIENT
Start: 2018-10-25 | End: 2019-10-18 | Stop reason: SDUPTHER

## 2018-10-25 RX ORDER — METRONIDAZOLE 500 MG/1
TABLET ORAL
Qty: 90 TABLET | Refills: 2 | Status: SHIPPED | OUTPATIENT
Start: 2018-10-25 | End: 2019-10-18 | Stop reason: SDUPTHER

## 2018-10-25 RX ORDER — ALBUTEROL SULFATE 2.5 MG/3ML
2.5 SOLUTION RESPIRATORY (INHALATION) EVERY 4 HOURS PRN
Qty: 120 EACH | Refills: 3 | Status: SHIPPED | OUTPATIENT
Start: 2018-10-25 | End: 2019-10-23 | Stop reason: SDUPTHER

## 2018-10-25 RX ORDER — SERTRALINE HYDROCHLORIDE 25 MG/1
25 TABLET, FILM COATED ORAL DAILY
Qty: 90 TABLET | Refills: 3 | Status: SHIPPED | OUTPATIENT
Start: 2018-10-25 | End: 2019-10-18 | Stop reason: SDUPTHER

## 2018-10-25 RX ORDER — CYANOCOBALAMIN 1000 UG/ML
1000 INJECTION INTRAMUSCULAR; SUBCUTANEOUS ONCE
Status: COMPLETED | OUTPATIENT
Start: 2018-10-25 | End: 2018-10-25

## 2018-10-25 RX ORDER — CLOPIDOGREL BISULFATE 75 MG/1
75 TABLET ORAL DAILY
Qty: 90 TABLET | Refills: 3 | Status: SHIPPED | OUTPATIENT
Start: 2018-10-25 | End: 2019-10-18 | Stop reason: SDUPTHER

## 2018-10-25 RX ORDER — CLOTRIMAZOLE AND BETAMETHASONE DIPROPIONATE 10; .64 MG/G; MG/G
CREAM TOPICAL
Qty: 45 G | Refills: 5 | Status: SHIPPED | OUTPATIENT
Start: 2018-10-25 | End: 2019-10-18 | Stop reason: SDUPTHER

## 2018-10-25 RX ADMIN — CYANOCOBALAMIN 1000 MCG: 1000 INJECTION INTRAMUSCULAR; SUBCUTANEOUS at 16:05

## 2018-10-25 ASSESSMENT — PATIENT HEALTH QUESTIONNAIRE - PHQ9
SUM OF ALL RESPONSES TO PHQ9 QUESTIONS 1 & 2: 2
2. FEELING DOWN, DEPRESSED OR HOPELESS: 1
SUM OF ALL RESPONSES TO PHQ QUESTIONS 1-9: 2
SUM OF ALL RESPONSES TO PHQ QUESTIONS 1-9: 2
1. LITTLE INTEREST OR PLEASURE IN DOING THINGS: 1

## 2018-10-25 NOTE — PROGRESS NOTES
Chief Complaint   Patient presents with    GI Problem     take 2 protonix feels nausea    Tingling     hands and feet taking magnesium oxide        HPI:  Reagan Amaya is a 68 y.o. female     Follow up  Was in to see Jacquelynne Crigler with tingling/cramps, etc    Found to have hypomagnesemia  VERY  Low    Started back on her supplement TID    Actually was told to f/u in [address]  Has only been 3    Compliant with meds for HTN    GERD, on protonix  COPD, uses albuterol    plavix for reported hx of TIA    She had ileojejunal bypass in 1978  She drinks ensure    Has lost some weight    Wt Readings from Last 3 Encounters:   10/25/18 115 lb 3.2 oz (52.3 kg)   10/17/18 114 lb 6.4 oz (51.9 kg)   06/26/18 115 lb (52.2 kg)         Patient Active Problem List   Diagnosis    HTN (hypertension)    Branch retinal vein occlusion    Cortical senile cataract    Nonexudative age-related macular degeneration    Nuclear senile cataract    GERD (gastroesophageal reflux disease)    [facility]    H/O chronic pancreatitis    B12 deficiency       Current Outpatient Prescriptions   Medication Sig Dispense Refill    sertraline (ZOLOFT) 25 MG tablet Take 1 tablet by mouth daily 90 tablet 3    clotrimazole-betamethasone (LOTRISONE) 1-0.05 % cream Apply topically 2 times daily.  45 g 5    pantoprazole (PROTONIX) 40 MG tablet Take 1 tablet by mouth daily 90 tablet 3    lipase-protease-amylase (CREON) 94357-31453 units delayed release capsule Take 1 capsule by mouth 2 times daily 180 capsule 3    clopidogrel (PLAVIX) 75 MG tablet Take 1 tablet by mouth daily 90 tablet 3    metroNIDAZOLE (FLAGYL) 500 MG tablet 1 tab tid for 10 days per month 90 tablet 2    Magnesium Oxide 400 MG CAPS Take 400 mg by mouth 2 times daily 180 capsule 3    propranolol (INDERAL) 40 MG tablet Take 1 tablet by mouth 2 times daily 180 tablet 3    albuterol sulfate HFA (VENTOLIN HFA) 108 (90 Base) MCG/ACT inhaler Inhale 2 puffs into the

## 2018-12-02 ENCOUNTER — CARE COORDINATION (OUTPATIENT)
Dept: CARE COORDINATION | Age: 73
End: 2018-12-02

## 2018-12-03 ENCOUNTER — CARE COORDINATION (OUTPATIENT)
Dept: CARE COORDINATION | Age: 73
End: 2018-12-03

## 2018-12-12 ENCOUNTER — CARE COORDINATION (OUTPATIENT)
Dept: CARE COORDINATION | Age: 73
End: 2018-12-12

## 2019-04-11 ENCOUNTER — OFFICE VISIT (OUTPATIENT)
Dept: FAMILY MEDICINE CLINIC | Age: 74
End: 2019-04-11
Payer: COMMERCIAL

## 2019-04-11 VITALS
OXYGEN SATURATION: 98 % | WEIGHT: 113.6 LBS | HEART RATE: 66 BPM | SYSTOLIC BLOOD PRESSURE: 130 MMHG | DIASTOLIC BLOOD PRESSURE: 88 MMHG | HEIGHT: 63 IN | BODY MASS INDEX: 20.13 KG/M2

## 2019-04-11 DIAGNOSIS — I10 ESSENTIAL HYPERTENSION: ICD-10-CM

## 2019-04-11 DIAGNOSIS — E53.8 B12 DEFICIENCY: ICD-10-CM

## 2019-04-11 DIAGNOSIS — L98.491 ULCER OF EXTERNAL EAR, LIMITED TO BREAKDOWN OF SKIN (HCC): ICD-10-CM

## 2019-04-11 DIAGNOSIS — F41.9 ANXIETY: ICD-10-CM

## 2019-04-11 DIAGNOSIS — I10 ESSENTIAL HYPERTENSION: Primary | ICD-10-CM

## 2019-04-11 DIAGNOSIS — E55.9 VITAMIN D DEFICIENCY: ICD-10-CM

## 2019-04-11 DIAGNOSIS — E83.42 HYPOMAGNESEMIA: ICD-10-CM

## 2019-04-11 DIAGNOSIS — K21.9 GASTROESOPHAGEAL REFLUX DISEASE, ESOPHAGITIS PRESENCE NOT SPECIFIED: ICD-10-CM

## 2019-04-11 DIAGNOSIS — Z87.19 H/O CHRONIC PANCREATITIS: ICD-10-CM

## 2019-04-11 DIAGNOSIS — J44.9 CHRONIC OBSTRUCTIVE PULMONARY DISEASE, UNSPECIFIED COPD TYPE (HCC): ICD-10-CM

## 2019-04-11 LAB
ALBUMIN SERPL-MCNC: 4.3 G/DL (ref 3.5–4.6)
ALP BLD-CCNC: 77 U/L (ref 40–130)
ALT SERPL-CCNC: 14 U/L (ref 0–33)
ANION GAP SERPL CALCULATED.3IONS-SCNC: 16 MEQ/L (ref 9–15)
AST SERPL-CCNC: 18 U/L (ref 0–35)
BILIRUB SERPL-MCNC: <0.2 MG/DL (ref 0.2–0.7)
BUN BLDV-MCNC: 24 MG/DL (ref 8–23)
CALCIUM SERPL-MCNC: 9.4 MG/DL (ref 8.5–9.9)
CHLORIDE BLD-SCNC: 107 MEQ/L (ref 95–107)
CO2: 23 MEQ/L (ref 20–31)
CREAT SERPL-MCNC: 0.96 MG/DL (ref 0.5–0.9)
GFR AFRICAN AMERICAN: >60
GFR NON-AFRICAN AMERICAN: 57
GLOBULIN: 2.8 G/DL (ref 2.3–3.5)
GLUCOSE BLD-MCNC: 65 MG/DL (ref 70–99)
HCT VFR BLD CALC: 41.6 % (ref 37–47)
HEMOGLOBIN: 14 G/DL (ref 12–16)
MAGNESIUM: 1.9 MG/DL (ref 1.7–2.4)
MCH RBC QN AUTO: 33.5 PG (ref 27–31.3)
MCHC RBC AUTO-ENTMCNC: 33.6 % (ref 33–37)
MCV RBC AUTO: 99.7 FL (ref 82–100)
PDW BLD-RTO: 13.6 % (ref 11.5–14.5)
PLATELET # BLD: 133 K/UL (ref 130–400)
POTASSIUM SERPL-SCNC: 4.8 MEQ/L (ref 3.4–4.9)
RBC # BLD: 4.18 M/UL (ref 4.2–5.4)
SODIUM BLD-SCNC: 146 MEQ/L (ref 135–144)
TOTAL PROTEIN: 7.1 G/DL (ref 6.3–8)
TSH SERPL DL<=0.05 MIU/L-ACNC: 2.37 UIU/ML (ref 0.44–3.86)
VITAMIN B-12: >2000 PG/ML (ref 232–1245)
VITAMIN D 25-HYDROXY: 32.7 NG/ML (ref 30–100)
WBC # BLD: 6.6 K/UL (ref 4.8–10.8)

## 2019-04-11 PROCEDURE — 99214 OFFICE O/P EST MOD 30 MIN: CPT | Performed by: FAMILY MEDICINE

## 2019-04-11 PROCEDURE — 96372 THER/PROPH/DIAG INJ SC/IM: CPT | Performed by: FAMILY MEDICINE

## 2019-04-11 RX ORDER — CEPHALEXIN 500 MG/1
CAPSULE ORAL
Refills: 0 | COMMUNITY
Start: 2019-03-13 | End: 2019-10-18

## 2019-04-11 RX ORDER — CYANOCOBALAMIN 1000 UG/ML
1000 INJECTION INTRAMUSCULAR; SUBCUTANEOUS ONCE
Status: COMPLETED | OUTPATIENT
Start: 2019-04-11 | End: 2019-04-11

## 2019-04-11 RX ORDER — DOXYCYCLINE HYCLATE 100 MG
100 TABLET ORAL 2 TIMES DAILY
Qty: 20 TABLET | Refills: 0 | Status: SHIPPED | OUTPATIENT
Start: 2019-04-11 | End: 2019-04-21

## 2019-04-11 RX ADMIN — CYANOCOBALAMIN 1000 MCG: 1000 INJECTION INTRAMUSCULAR; SUBCUTANEOUS at 13:11

## 2019-04-11 ASSESSMENT — PATIENT HEALTH QUESTIONNAIRE - PHQ9
2. FEELING DOWN, DEPRESSED OR HOPELESS: 1
SUM OF ALL RESPONSES TO PHQ QUESTIONS 1-9: 1
SUM OF ALL RESPONSES TO PHQ QUESTIONS 1-9: 1
1. LITTLE INTEREST OR PLEASURE IN DOING THINGS: 0
SUM OF ALL RESPONSES TO PHQ9 QUESTIONS 1 & 2: 1

## 2019-04-11 NOTE — PROGRESS NOTES
Chief Complaint   Patient presents with    COPD     needs blood work, would like b-12     Otalgia     has an ucler on back, went to urgent care, given cephalexon and mupirocin2%, doesn't seem to be getting better        HPI:  Ghanshyam Hood is a 68 y.o. female     Follow up  Problem list as below      Wants b12    Was at urgent care for lesion on back of ear    Compliant with meds for HTN    GERD, on protonix  COPD, uses albuterol    plavix for reported hx of TIA    She had ileojejunal bypass in 1978  She drinks ensure        Wt Readings from Last 3 Encounters:   04/11/19 113 lb 9.6 oz (51.5 kg)   10/25/18 115 lb 3.2 oz (52.3 kg)   10/17/18 114 lb 6.4 oz (51.9 kg)         Patient Active Problem List   Diagnosis    HTN (hypertension)    Branch retinal vein occlusion    Cortical senile cataract    Nonexudative age-related macular degeneration    Nuclear senile cataract    GERD (gastroesophageal reflux disease)    Chronic obstructive pulmonary disease ([address]    H/O chronic pancreatitis    B12 deficiency    Anxiety       Current Outpatient Medications   Medication Sig Dispense Refill    cephALEXin (KEFLEX) 500 MG capsule TK ONE C PO Q 6 H FOR 10 DAYS  0    mupirocin (BACTROBAN) 2 % ointment MICHEALLE TOPICALLY AA TID FOR 10 DAYS 15 g 0    doxycycline hyclate (VIBRA-TABS) 100 MG tablet Take 1 tablet by mouth 2 times daily for 10 days 20 tablet 0    sertraline (ZOLOFT) 25 MG tablet Take 1 tablet by mouth daily 90 tablet 3    clotrimazole-betamethasone (LOTRISONE) 1-0.05 % cream Apply topically 2 times daily.  45 g 5    pantoprazole (PROTONIX) 40 MG tablet Take 1 tablet by mouth daily 90 tablet 3    lipase-protease-amylase (CREON) 67115-57349 units delayed release capsule Take 1 capsule by mouth 2 times daily 180 capsule 3    clopidogrel (PLAVIX) 75 MG tablet Take 1 tablet by mouth daily 90 tablet 3    metroNIDAZOLE (FLAGYL) 500 MG tablet 1 tab tid for 10 days per month 90 tablet 2    Magnesium Oxide 400 MG CAPS Take 400 mg by mouth 2 times daily 180 capsule 3    propranolol (INDERAL) 40 MG tablet Take 1 tablet by mouth 2 times daily 180 tablet 3    albuterol sulfate HFA (VENTOLIN HFA) 108 (90 Base) MCG/ACT inhaler Inhale 2 puffs into the lungs every 6 hours as needed for Wheezing 3 Inhaler 3    albuterol (PROVENTIL) (2.5 MG/3ML) 0.083% nebulizer solution Take 3 mLs by nebulization every 4 hours as needed for Wheezing 120 each 3     Current Facility-Administered Medications   Medication Dose Route Frequency Provider Last Rate Last Dose    cyanocobalamin injection 1,000 mcg  1,000 mcg Intramuscular Once Briana Santana MD             Past Medical History:   Diagnosis Date    Bowel disease     Cataract     Chronic back pain     COPD (chronic obstructive pulmonary disease) (HCC)     Dizziness and giddiness     GERD (gastroesophageal reflux disease)     Glaucoma     Hypertension     Osteoarthritis     Pancreatitis     Seizures (HCC)     TIA (transient ischemic attack)      Past Surgical History:   Procedure Laterality Date    APPENDECTOMY      CHOLECYSTECTOMY      INTESTINAL BYPASS      jejunoileal     Family History   Problem Relation Age of Onset    Arthritis Other     Heart Disease Other     High Blood Pressure Other     Kidney Disease Other     Other Other         respiratory problems     Social History     Socioeconomic History    Marital status:      Spouse name: None    Number of children: None    Years of education: None    Highest education level: None   Occupational History    None   Social Needs    Financial resource strain: None    Food insecurity:     Worry: None     Inability: None    Transportation needs:     Medical: None     Non-medical: None   Tobacco Use    Smoking status: Current Every Day Smoker     Packs/day: 2.00     Years: 54.00     Pack years: 108.00     Types: Cigarettes    Smokeless tobacco: Never Used   Substance and Sexual Activity    Alcohol use:  No Alcohol/week: 0.0 oz    Drug use: No    Sexual activity: Yes     Partners: Male     Comment:    Lifestyle    Physical activity:     Days per week: None     Minutes per session: None    Stress: None   Relationships    Social connections:     Talks on phone: None     Gets together: None     Attends Mu-ism service: None     Active member of club or organization: None     Attends meetings of clubs or organizations: None     Relationship status: None    Intimate partner violence:     Fear of current or ex partner: None     Emotionally abused: None     Physically abused: None     Forced sexual activity: None   Other Topics Concern    None   Social History Narrative    None     Allergies   Allergen Reactions    Aspirin      Bothers stomach    Penicillins      Unable to take    Adhesive Tape Rash     Patient states Silk Tape works the best       Review of Systems:   General ROS: fatigue  Respiratory ROS: no cough, shortness of breath, or wheezing  Cardiovascular ROS: no chest pain or dyspnea on exertion  Gastrointestinal ROS: hx of ileojejunal bypass  Genito-Urinary ROS: no dysuria, trouble voiding  Musculoskeletal ROS: some pain in her lower legs  Neurological ROS: negative for - behavioral changes, memory loss, numbness/tingling, tremors or weakness    In general patient otherwise reports feeling well. Physical Exam:  /88 (Site: Left Upper Arm)   Pulse 66   Ht 5' 2.5\" (1.588 m)   Wt 113 lb 9.6 oz (51.5 kg)   SpO2 98%   Breastfeeding? No   BMI 20.45 kg/m²     Gen: Well, NAD, Alert, Oriented x 3   HEENT: EOMI, eyes clear, MMM  Skin: left ear ulceration   Neck: no significant lymphadenopathy or thyromegaly  Lungs: CTA B w/out Rales/Wheezes/Rhonchi, Good respiratory effort   Heart: RRR, S1S2, w/out M/R/G, non-displaced PMI   Ext: No C/C/E Bilaterally. Neuro: Neurovascularly intact w/ Sensory/Motor intact UE/LE Bilaterally.     Lab Results   Component Value Date    WBC 7.0 06/26/2018 HGB 14.1 06/26/2018    HCT 41.3 06/26/2018     06/26/2018    CHOL 118 12/08/2017    TRIG 235 (H) 12/08/2017    HDL 26 (L) 12/08/2017    ALT 8 10/17/2018    AST 14 10/17/2018     10/25/2018    K 3.8 10/25/2018     10/25/2018    CREATININE 0.81 10/25/2018    BUN 21 10/25/2018    CO2 22 10/25/2018    TSH 2.710 06/26/2018    INR 1.1 10/04/2014         A&P   Diagnosis Orders   1. Essential hypertension  Comprehensive Metabolic Panel    CBC   2. H/O chronic pancreatitis     3. Gastroesophageal reflux disease, esophagitis presence not specified     4. Chronic obstructive pulmonary disease, unspecified COPD type ([facility] [address]     5. B12 deficiency  Vitamin B12    cyanocobalamin injection 1,000 mcg   6. Anxiety  TSH without Reflex   7. Hypomagnesemia  Magnesium   8. Vitamin D deficiency  Vitamin D 25 Hydroxy   9.  Ulcer of external ear, limited to breakdown of skin (HCC)  mupirocin (BACTROBAN) 2 % ointment    doxycycline hyclate (VIBRA-TABS) 100 MG tablet    Referral To External Dermatology - Jonny Hill MD     Will try another abx as she missed many doses  Refill ointment    Derm referral due to concern of cancer     Chronic conditions are stable  Continue current regimen  Follow up with appropriate specialists and here routinely for ongoing monitoring of chronic conditions    Declines c-scope or mammogram    She is generally stable at this time  [facility]      F/u 6 mos    Wt Readings from Last 3 Encounters:   04/11/19 113 lb 9.6 oz (51.5 kg)   10/25/18 115 lb 3.2 oz (52.3 kg)   10/17/18 114 lb 6.4 oz (51.9 kg)         Kourtney Luciano MD

## 2019-04-11 NOTE — PROGRESS NOTES
After obtaining consent, and per orders of Dr. Abbe Mcclellan, injection of b-12 given in Right upper quad. gluteus by Vivian Fowler. Patient instructed to remain in clinic for 20 minutes afterwards, and to report any adverse reaction to me immediately.

## 2019-10-18 ENCOUNTER — OFFICE VISIT (OUTPATIENT)
Dept: FAMILY MEDICINE CLINIC | Age: 74
End: 2019-10-18
Payer: COMMERCIAL

## 2019-10-18 VITALS
RESPIRATION RATE: 12 BRPM | HEIGHT: 63 IN | HEART RATE: 76 BPM | SYSTOLIC BLOOD PRESSURE: 200 MMHG | WEIGHT: 117 LBS | DIASTOLIC BLOOD PRESSURE: 110 MMHG | BODY MASS INDEX: 20.73 KG/M2

## 2019-10-18 DIAGNOSIS — F41.9 ANXIETY: ICD-10-CM

## 2019-10-18 DIAGNOSIS — L30.9 ECZEMA, UNSPECIFIED TYPE: ICD-10-CM

## 2019-10-18 DIAGNOSIS — N81.10 BLADDER PROLAPSE, FEMALE, ACQUIRED: ICD-10-CM

## 2019-10-18 DIAGNOSIS — K86.89 PANCREATIC INSUFFICIENCY: ICD-10-CM

## 2019-10-18 DIAGNOSIS — G45.9 TRANSIENT CEREBRAL ISCHEMIA, UNSPECIFIED TYPE: ICD-10-CM

## 2019-10-18 DIAGNOSIS — I10 ESSENTIAL HYPERTENSION: Primary | ICD-10-CM

## 2019-10-18 DIAGNOSIS — Z98.0 INTESTINAL BYPASS OR ANASTOMOSIS STATUS: ICD-10-CM

## 2019-10-18 DIAGNOSIS — Z23 NEED FOR PROPHYLACTIC VACCINATION AGAINST STREPTOCOCCUS PNEUMONIAE (PNEUMOCOCCUS): ICD-10-CM

## 2019-10-18 DIAGNOSIS — Z76.0 MEDICATION REFILL: ICD-10-CM

## 2019-10-18 DIAGNOSIS — J44.9 CHRONIC OBSTRUCTIVE PULMONARY DISEASE, UNSPECIFIED COPD TYPE (HCC): ICD-10-CM

## 2019-10-18 DIAGNOSIS — E83.42 HYPOMAGNESEMIA: ICD-10-CM

## 2019-10-18 DIAGNOSIS — K21.9 GASTROESOPHAGEAL REFLUX DISEASE, ESOPHAGITIS PRESENCE NOT SPECIFIED: ICD-10-CM

## 2019-10-18 DIAGNOSIS — Z23 NEEDS FLU SHOT: ICD-10-CM

## 2019-10-18 DIAGNOSIS — H60.331 ACUTE SWIMMER'S EAR OF RIGHT SIDE: ICD-10-CM

## 2019-10-18 DIAGNOSIS — E53.8 B12 DEFICIENCY: ICD-10-CM

## 2019-10-18 DIAGNOSIS — N93.9 VAGINAL BLEEDING: ICD-10-CM

## 2019-10-18 PROCEDURE — 90670 PCV13 VACCINE IM: CPT | Performed by: FAMILY MEDICINE

## 2019-10-18 PROCEDURE — 90472 IMMUNIZATION ADMIN EACH ADD: CPT | Performed by: FAMILY MEDICINE

## 2019-10-18 PROCEDURE — 90653 IIV ADJUVANT VACCINE IM: CPT | Performed by: FAMILY MEDICINE

## 2019-10-18 PROCEDURE — 99214 OFFICE O/P EST MOD 30 MIN: CPT | Performed by: FAMILY MEDICINE

## 2019-10-18 PROCEDURE — 90471 IMMUNIZATION ADMIN: CPT | Performed by: FAMILY MEDICINE

## 2019-10-18 PROCEDURE — 96372 THER/PROPH/DIAG INJ SC/IM: CPT | Performed by: FAMILY MEDICINE

## 2019-10-18 RX ORDER — CLOTRIMAZOLE AND BETAMETHASONE DIPROPIONATE 10; .64 MG/G; MG/G
CREAM TOPICAL
Qty: 45 G | Refills: 5 | Status: SHIPPED | OUTPATIENT
Start: 2019-10-18 | End: 2019-10-23 | Stop reason: SDUPTHER

## 2019-10-18 RX ORDER — SERTRALINE HYDROCHLORIDE 25 MG/1
25 TABLET, FILM COATED ORAL DAILY
Qty: 90 TABLET | Refills: 3 | Status: SHIPPED | OUTPATIENT
Start: 2019-10-18 | End: 2019-10-23 | Stop reason: SDUPTHER

## 2019-10-18 RX ORDER — CLOPIDOGREL BISULFATE 75 MG/1
75 TABLET ORAL DAILY
Qty: 90 TABLET | Refills: 3 | Status: SHIPPED | OUTPATIENT
Start: 2019-10-18 | End: 2019-10-23 | Stop reason: SDUPTHER

## 2019-10-18 RX ORDER — CYANOCOBALAMIN 1000 UG/ML
1000 INJECTION INTRAMUSCULAR; SUBCUTANEOUS ONCE
Status: COMPLETED | OUTPATIENT
Start: 2019-10-18 | End: 2019-10-18

## 2019-10-18 RX ORDER — CALCIUM CARBONATE/VITAMIN D3 500-10/5ML
400 LIQUID (ML) ORAL 2 TIMES DAILY
Qty: 180 CAPSULE | Refills: 3 | Status: SHIPPED | OUTPATIENT
Start: 2019-10-18 | End: 2019-10-23 | Stop reason: SDUPTHER

## 2019-10-18 RX ORDER — LOSARTAN POTASSIUM AND HYDROCHLOROTHIAZIDE 25; 100 MG/1; MG/1
1 TABLET ORAL DAILY
Qty: 30 TABLET | Refills: 5 | Status: SHIPPED | OUTPATIENT
Start: 2019-10-18 | End: 2019-10-23 | Stop reason: SDUPTHER

## 2019-10-18 RX ORDER — PROPRANOLOL HYDROCHLORIDE 40 MG/1
40 TABLET ORAL 2 TIMES DAILY
Qty: 180 TABLET | Refills: 3 | Status: SHIPPED | OUTPATIENT
Start: 2019-10-18 | End: 2019-10-23 | Stop reason: SDUPTHER

## 2019-10-18 RX ORDER — PANTOPRAZOLE SODIUM 40 MG/1
40 TABLET, DELAYED RELEASE ORAL DAILY
Qty: 90 TABLET | Refills: 3 | Status: SHIPPED | OUTPATIENT
Start: 2019-10-18 | End: 2019-10-23 | Stop reason: SDUPTHER

## 2019-10-18 RX ORDER — METRONIDAZOLE 500 MG/1
TABLET ORAL
Qty: 90 TABLET | Refills: 2 | Status: SHIPPED | OUTPATIENT
Start: 2019-10-18 | End: 2020-01-23

## 2019-10-18 RX ADMIN — CYANOCOBALAMIN 1000 MCG: 1000 INJECTION INTRAMUSCULAR; SUBCUTANEOUS at 14:14

## 2019-10-22 ENCOUNTER — TELEPHONE (OUTPATIENT)
Dept: FAMILY MEDICINE CLINIC | Age: 74
End: 2019-10-22

## 2019-10-23 ENCOUNTER — NURSE ONLY (OUTPATIENT)
Dept: FAMILY MEDICINE CLINIC | Age: 74
End: 2019-10-23

## 2019-10-23 ENCOUNTER — TELEPHONE (OUTPATIENT)
Dept: FAMILY MEDICINE CLINIC | Age: 74
End: 2019-10-23

## 2019-10-23 VITALS — SYSTOLIC BLOOD PRESSURE: 162 MMHG | DIASTOLIC BLOOD PRESSURE: 82 MMHG

## 2019-10-23 DIAGNOSIS — Z76.0 MEDICATION REFILL: ICD-10-CM

## 2019-10-23 DIAGNOSIS — G45.9 TRANSIENT CEREBRAL ISCHEMIA, UNSPECIFIED TYPE: ICD-10-CM

## 2019-10-23 DIAGNOSIS — L30.9 ECZEMA, UNSPECIFIED TYPE: ICD-10-CM

## 2019-10-23 DIAGNOSIS — K86.89 PANCREATIC INSUFFICIENCY: ICD-10-CM

## 2019-10-23 DIAGNOSIS — F41.9 ANXIETY: ICD-10-CM

## 2019-10-23 DIAGNOSIS — I10 ESSENTIAL HYPERTENSION: ICD-10-CM

## 2019-10-23 DIAGNOSIS — E83.42 HYPOMAGNESEMIA: ICD-10-CM

## 2019-10-23 DIAGNOSIS — L98.491 ULCER OF EXTERNAL EAR, LIMITED TO BREAKDOWN OF SKIN (HCC): ICD-10-CM

## 2019-10-23 DIAGNOSIS — J44.9 CHRONIC OBSTRUCTIVE PULMONARY DISEASE, UNSPECIFIED COPD TYPE (HCC): ICD-10-CM

## 2019-10-23 DIAGNOSIS — K21.9 GASTROESOPHAGEAL REFLUX DISEASE, ESOPHAGITIS PRESENCE NOT SPECIFIED: ICD-10-CM

## 2019-10-23 DIAGNOSIS — Z01.30 BP CHECK: Primary | ICD-10-CM

## 2019-10-23 RX ORDER — LOSARTAN POTASSIUM AND HYDROCHLOROTHIAZIDE 25; 100 MG/1; MG/1
1 TABLET ORAL DAILY
Qty: 90 TABLET | Refills: 3 | Status: SHIPPED | OUTPATIENT
Start: 2019-10-23 | End: 2020-01-28 | Stop reason: SDUPTHER

## 2019-10-23 RX ORDER — CALCIUM CARBONATE/VITAMIN D3 500-10/5ML
400 LIQUID (ML) ORAL 2 TIMES DAILY
Qty: 180 CAPSULE | Refills: 3 | Status: SHIPPED | OUTPATIENT
Start: 2019-10-23 | End: 2020-03-11 | Stop reason: SDUPTHER

## 2019-10-23 RX ORDER — SERTRALINE HYDROCHLORIDE 25 MG/1
25 TABLET, FILM COATED ORAL DAILY
Qty: 90 TABLET | Refills: 3 | Status: SHIPPED | OUTPATIENT
Start: 2019-10-23 | End: 2020-05-07

## 2019-10-23 RX ORDER — PROPRANOLOL HYDROCHLORIDE 40 MG/1
40 TABLET ORAL 2 TIMES DAILY
Qty: 180 TABLET | Refills: 3 | Status: SHIPPED | OUTPATIENT
Start: 2019-10-23 | End: 2020-08-21

## 2019-10-23 RX ORDER — CLOPIDOGREL BISULFATE 75 MG/1
75 TABLET ORAL DAILY
Qty: 90 TABLET | Refills: 3 | Status: SHIPPED | OUTPATIENT
Start: 2019-10-23 | End: 2020-10-19

## 2019-10-23 RX ORDER — PANTOPRAZOLE SODIUM 40 MG/1
40 TABLET, DELAYED RELEASE ORAL DAILY
Qty: 90 TABLET | Refills: 3 | Status: SHIPPED | OUTPATIENT
Start: 2019-10-23 | End: 2020-10-19

## 2019-10-23 RX ORDER — ALBUTEROL SULFATE 90 UG/1
2 AEROSOL, METERED RESPIRATORY (INHALATION) EVERY 6 HOURS PRN
Qty: 3 INHALER | Refills: 3 | Status: SHIPPED | OUTPATIENT
Start: 2019-10-23 | End: 2020-03-06 | Stop reason: SDUPTHER

## 2019-10-23 RX ORDER — ALBUTEROL SULFATE 2.5 MG/3ML
2.5 SOLUTION RESPIRATORY (INHALATION) EVERY 4 HOURS PRN
Qty: 120 EACH | Refills: 3 | Status: SHIPPED | OUTPATIENT
Start: 2019-10-23 | End: 2020-01-28 | Stop reason: SDUPTHER

## 2019-10-23 RX ORDER — CLOTRIMAZOLE AND BETAMETHASONE DIPROPIONATE 10; .64 MG/G; MG/G
CREAM TOPICAL
Qty: 45 G | Refills: 5 | Status: SHIPPED | OUTPATIENT
Start: 2019-10-23 | End: 2020-10-22

## 2019-10-23 RX ORDER — SERTRALINE HYDROCHLORIDE 25 MG/1
TABLET, FILM COATED ORAL
Qty: 90 TABLET | Refills: 4 | OUTPATIENT
Start: 2019-10-23

## 2019-11-15 ENCOUNTER — OFFICE VISIT (OUTPATIENT)
Dept: OBGYN CLINIC | Age: 74
End: 2019-11-15
Payer: COMMERCIAL

## 2019-11-15 VITALS
BODY MASS INDEX: 20.8 KG/M2 | SYSTOLIC BLOOD PRESSURE: 138 MMHG | WEIGHT: 113 LBS | DIASTOLIC BLOOD PRESSURE: 82 MMHG | HEIGHT: 62 IN

## 2019-11-15 DIAGNOSIS — S31.41XA NON-OBSTETRIC VAGINAL LACERATION, UNSPECIFIED WHETHER FOREIGN BODY PRESENT, UNSPECIFIED WHETHER PERINEAL LACERATION PRESENT, INITIAL ENCOUNTER: ICD-10-CM

## 2019-11-15 DIAGNOSIS — N95.0 PMB (POSTMENOPAUSAL BLEEDING): Primary | ICD-10-CM

## 2019-11-15 DIAGNOSIS — N93.9 VAGINAL BLEEDING: ICD-10-CM

## 2019-11-15 DIAGNOSIS — N95.0 PMB (POSTMENOPAUSAL BLEEDING): ICD-10-CM

## 2019-11-15 LAB
APTT: 33.4 SEC (ref 24.4–36.8)
BASOPHILS ABSOLUTE: 0.1 K/UL (ref 0–0.2)
BASOPHILS RELATIVE PERCENT: 0.8 %
EOSINOPHILS ABSOLUTE: 0.2 K/UL (ref 0–0.7)
EOSINOPHILS RELATIVE PERCENT: 3.1 %
HCT VFR BLD CALC: 40.8 % (ref 37–47)
HEMOGLOBIN: 13.9 G/DL (ref 12–16)
INR BLD: 0.9
LYMPHOCYTES ABSOLUTE: 1.8 K/UL (ref 1–4.8)
LYMPHOCYTES RELATIVE PERCENT: 25.3 %
MCH RBC QN AUTO: 32.9 PG (ref 27–31.3)
MCHC RBC AUTO-ENTMCNC: 34 % (ref 33–37)
MCV RBC AUTO: 97 FL (ref 82–100)
MONOCYTES ABSOLUTE: 0.6 K/UL (ref 0.2–0.8)
MONOCYTES RELATIVE PERCENT: 8.1 %
NEUTROPHILS ABSOLUTE: 4.5 K/UL (ref 1.4–6.5)
NEUTROPHILS RELATIVE PERCENT: 62.7 %
PDW BLD-RTO: 13.8 % (ref 11.5–14.5)
PLATELET # BLD: 224 K/UL (ref 130–400)
PROTHROMBIN TIME: 12.9 SEC (ref 12.3–14.9)
RBC # BLD: 4.21 M/UL (ref 4.2–5.4)
T4 FREE: 1.26 NG/DL (ref 0.84–1.68)
TSH SERPL DL<=0.05 MIU/L-ACNC: 1.71 UIU/ML (ref 0.44–3.86)
WBC # BLD: 7.2 K/UL (ref 4.8–10.8)

## 2019-11-15 PROCEDURE — 99203 OFFICE O/P NEW LOW 30 MIN: CPT | Performed by: OBSTETRICS & GYNECOLOGY

## 2019-11-15 ASSESSMENT — ENCOUNTER SYMPTOMS
ABDOMINAL PAIN: 0
CONSTIPATION: 0
DIARRHEA: 0
VOMITING: 0
BLOOD IN STOOL: 0
ABDOMINAL DISTENTION: 0
RESPIRATORY NEGATIVE: 1
ANAL BLEEDING: 0
ALLERGIC/IMMUNOLOGIC NEGATIVE: 1
EYES NEGATIVE: 1
RECTAL PAIN: 0
NAUSEA: 0

## 2019-11-22 ENCOUNTER — HOSPITAL ENCOUNTER (OUTPATIENT)
Dept: ULTRASOUND IMAGING | Age: 74
Discharge: HOME | End: 2019-11-24
Payer: MEDICARE

## 2019-11-22 DIAGNOSIS — N95.0 PMB (POSTMENOPAUSAL BLEEDING): ICD-10-CM

## 2019-11-22 PROCEDURE — 76830 TRANSVAGINAL US NON-OB: CPT

## 2019-11-22 PROCEDURE — 76856 US EXAM PELVIC COMPLETE: CPT

## 2019-11-25 DIAGNOSIS — N95.0 PMB (POSTMENOPAUSAL BLEEDING): ICD-10-CM

## 2019-12-02 ENCOUNTER — PROCEDURE VISIT (OUTPATIENT)
Dept: OBGYN CLINIC | Age: 74
End: 2019-12-02
Payer: MEDICARE

## 2019-12-02 VITALS — WEIGHT: 115 LBS | BODY MASS INDEX: 21.03 KG/M2 | DIASTOLIC BLOOD PRESSURE: 82 MMHG | SYSTOLIC BLOOD PRESSURE: 142 MMHG

## 2019-12-02 DIAGNOSIS — N95.0 PMB (POSTMENOPAUSAL BLEEDING): ICD-10-CM

## 2019-12-02 DIAGNOSIS — N95.0 PMB (POSTMENOPAUSAL BLEEDING): Primary | ICD-10-CM

## 2019-12-02 PROCEDURE — 58100 BIOPSY OF UTERUS LINING: CPT | Performed by: OBSTETRICS & GYNECOLOGY

## 2019-12-02 ASSESSMENT — ENCOUNTER SYMPTOMS
BLOOD IN STOOL: 0
ABDOMINAL PAIN: 0
NAUSEA: 0
CONSTIPATION: 0
ANAL BLEEDING: 0
DIARRHEA: 0
RECTAL PAIN: 0
VOMITING: 0
RESPIRATORY NEGATIVE: 1
ALLERGIC/IMMUNOLOGIC NEGATIVE: 1
EYES NEGATIVE: 1
ABDOMINAL DISTENTION: 0

## 2020-01-23 RX ORDER — METRONIDAZOLE 500 MG/1
TABLET ORAL
Qty: 90 TABLET | Refills: 4 | Status: SHIPPED | OUTPATIENT
Start: 2020-01-23 | End: 2020-05-07

## 2020-01-28 ENCOUNTER — OFFICE VISIT (OUTPATIENT)
Dept: FAMILY MEDICINE CLINIC | Age: 75
End: 2020-01-28
Payer: MEDICARE

## 2020-01-28 VITALS
HEART RATE: 76 BPM | DIASTOLIC BLOOD PRESSURE: 98 MMHG | SYSTOLIC BLOOD PRESSURE: 170 MMHG | BODY MASS INDEX: 21.49 KG/M2 | WEIGHT: 116.8 LBS | HEIGHT: 62 IN | OXYGEN SATURATION: 97 %

## 2020-01-28 PROCEDURE — 99214 OFFICE O/P EST MOD 30 MIN: CPT | Performed by: FAMILY MEDICINE

## 2020-01-28 PROCEDURE — 96372 THER/PROPH/DIAG INJ SC/IM: CPT | Performed by: FAMILY MEDICINE

## 2020-01-28 RX ORDER — CLONAZEPAM 0.5 MG/1
0.5 TABLET ORAL NIGHTLY PRN
Qty: 30 TABLET | Refills: 0 | Status: SHIPPED | OUTPATIENT
Start: 2020-01-28 | End: 2020-02-28 | Stop reason: SDUPTHER

## 2020-01-28 RX ORDER — CYANOCOBALAMIN 1000 UG/ML
1000 INJECTION INTRAMUSCULAR; SUBCUTANEOUS ONCE
Status: COMPLETED | OUTPATIENT
Start: 2020-01-28 | End: 2020-01-28

## 2020-01-28 RX ORDER — ALBUTEROL SULFATE 2.5 MG/3ML
2.5 SOLUTION RESPIRATORY (INHALATION) EVERY 4 HOURS PRN
Qty: 120 EACH | Refills: 3 | Status: SHIPPED | OUTPATIENT
Start: 2020-01-28 | End: 2020-04-02 | Stop reason: SDUPTHER

## 2020-01-28 RX ORDER — LOSARTAN POTASSIUM AND HYDROCHLOROTHIAZIDE 25; 100 MG/1; MG/1
1 TABLET ORAL DAILY
Qty: 90 TABLET | Refills: 3 | Status: ON HOLD | OUTPATIENT
Start: 2020-01-28 | End: 2020-03-25 | Stop reason: HOSPADM

## 2020-01-28 RX ORDER — DONEPEZIL HYDROCHLORIDE 5 MG/1
5 TABLET, FILM COATED ORAL NIGHTLY
Qty: 30 TABLET | Refills: 5 | Status: SHIPPED | OUTPATIENT
Start: 2020-01-28 | End: 2020-07-20 | Stop reason: SDUPTHER

## 2020-01-28 RX ADMIN — CYANOCOBALAMIN 1000 MCG: 1000 INJECTION INTRAMUSCULAR; SUBCUTANEOUS at 13:59

## 2020-01-28 ASSESSMENT — PATIENT HEALTH QUESTIONNAIRE - PHQ9
2. FEELING DOWN, DEPRESSED OR HOPELESS: 0
SUM OF ALL RESPONSES TO PHQ9 QUESTIONS 1 & 2: 0
SUM OF ALL RESPONSES TO PHQ QUESTIONS 1-9: 0
1. LITTLE INTEREST OR PLEASURE IN DOING THINGS: 0
SUM OF ALL RESPONSES TO PHQ QUESTIONS 1-9: 0

## 2020-01-28 NOTE — PROGRESS NOTES
Chief Complaint   Patient presents with    Anxiety      just send home on hospice, shaking    Edema     feet swelling    Discuss Labs     needs blood work       HPI:  Jim Red is a 69 y.o. female      on hospice  Making her very anxious    Note given from daughter  Concern of dementia/sundowning    Used to be on klonopin for anxiety    Anxiety worse due to     Swelling in her feet, L>R    She never followed up with Dr. Randolph Church following EMB  Needs to return    Wants b12    Compliant with meds for HTN    GERD, on protonix  COPD, uses albuterol    plavix for reported hx of TIA    She had ileojejunal bypass in 1978  She drinks ensure        Wt Readings from Last 3 Encounters:   01/28/20 116 lb 12.8 oz (53 kg)   12/02/19 115 lb (52.2 kg)   11/15/19 113 lb (51.3 kg)         Patient Active Problem List   Diagnosis    HTN (hypertension)    Branch retinal vein occlusion    Cortical senile cataract    Nonexudative age-related macular degeneration    Nuclear senile cataract    GERD (gastroesophageal reflux disease)    Chronic obstructive pulmonary disease ([address]    H/O chronic pancreatitis    B12 deficiency    Anxiety    COPD (chronic obstructive pulmonary disease) ([address]       Current Outpatient Medications   Medication Sig Dispense Refill    albuterol (PROVENTIL) (2.5 MG/3ML) 0.083% nebulizer solution Take 3 mLs by nebulization every 4 hours as needed for Wheezing 120 each 3    donepezil (ARICEPT) 5 MG tablet Take 1 tablet by mouth nightly 30 tablet 5    clonazePAM (KLONOPIN) 0.5 MG tablet Take 1 tablet by mouth nightly as needed for Anxiety for up to 30 days.  30 tablet 0    losartan-hydrochlorothiazide (HYZAAR) 100-25 MG per tablet Take 1 tablet by mouth daily 90 tablet 3    metroNIDAZOLE (FLAGYL) 500 MG tablet TAKE 1 TABLET THREE TIMES A DAY FOR 10 DAYS PER MONTH 90 tablet 4    Oxyquinoline-Sod Lauryl Sulf (TRIMO-SONG) 0.025-0.01 % GEL Place 113.4 g vaginally Twice a Week 113.4 g 3  albuterol sulfate HFA (VENTOLIN HFA) 108 (90 Base) MCG/ACT inhaler Inhale 2 puffs into the lungs every 6 hours as needed for Wheezing 3 Inhaler 3    clopidogrel (PLAVIX) 75 MG tablet Take 1 tablet by mouth daily 90 tablet 3    clotrimazole-betamethasone (LOTRISONE) 1-0.05 % cream Apply topically 2 times daily.  45 g 5    lipase-protease-amylase (CREON) 02212-25998 units delayed release capsule Take 1 capsule by mouth 2 times daily 180 capsule 3    Magnesium Oxide 400 MG CAPS Take 400 mg by mouth 2 times daily 180 capsule 3    pantoprazole (PROTONIX) 40 MG tablet Take 1 tablet by mouth daily 90 tablet 3    mupirocin (BACTROBAN) 2 % ointment MICHAELLE TOPICALLY AA TID FOR 10 DAYS 15 g 0    propranolol (INDERAL) 40 MG tablet Take 1 tablet by mouth 2 times daily 180 tablet 3    sertraline (ZOLOFT) 25 MG tablet Take 1 tablet by mouth daily 90 tablet 3     Current Facility-Administered Medications   Medication Dose Route Frequency Provider Last Rate Last Dose    cyanocobalamin injection 1,000 mcg  1,000 mcg Intramuscular Once Oscar Ruggiero MD             Past Medical History:   Diagnosis Date    Bowel disease     Cataract     Chronic back pain     COPD (chronic obstructive pulmonary disease) (HCC)     Dizziness and giddiness     GERD (gastroesophageal reflux disease)     Glaucoma     Hypertension     Osteoarthritis     Pancreatitis     Seizures (HCC)     TIA (transient ischemic attack)      Past Surgical History:   Procedure Laterality Date    APPENDECTOMY      CHOLECYSTECTOMY      INTESTINAL BYPASS      jejunoileal     Family History   Problem Relation Age of Onset    Arthritis Other     Heart Disease Other     High Blood Pressure Other     Kidney Disease Other     Other Other         respiratory problems    Breast Cancer Neg Hx     Colon Cancer Neg Hx     Diabetes Neg Hx     Cancer Neg Hx     Eclampsia Neg Hx     Ovarian Cancer Neg Hx     Hypertension Neg Hx      Labor Neg Hx     Spont Abortions Neg Hx     Stroke Neg Hx      Social History     Socioeconomic History    Marital status:      Spouse name: None    Number of children: None    Years of education: None    Highest education level: None   Occupational History    None   Social Needs    Financial resource strain: None    Food insecurity:     Worry: None     Inability: None    Transportation needs:     Medical: None     Non-medical: None   Tobacco Use    Smoking status: Current Every Day Smoker     Packs/day: 2.00     Years: 54.00     Pack years: 108.00     Types: Cigarettes    Smokeless tobacco: Never Used   Substance and Sexual Activity    Alcohol use: No     Alcohol/week: 0.0 standard drinks    Drug use: No    Sexual activity: Not Currently     Partners: Male     Comment:    Lifestyle    Physical activity:     Days per week: None     Minutes per session: None    Stress: None   Relationships    Social connections:     Talks on phone: None     Gets together: None     Attends Restorationism service: None     Active member of club or organization: None     Attends meetings of clubs or organizations: None     Relationship status: None    Intimate partner violence:     Fear of current or ex partner: None     Emotionally abused: None     Physically abused: None     Forced sexual activity: None   Other Topics Concern    None   Social History Narrative    None     Allergies   Allergen Reactions    Aspirin      Bothers stomach    Penicillins      Unable to take    Adhesive Tape Rash     Patient states Silk Tape works the best       Review of Systems:   General ROS: fatigue  Respiratory ROS: no cough, shortness of breath, or wheezing  Cardiovascular ROS: no chest pain or dyspnea on exertion  Gastrointestinal ROS: hx of ileojejunal bypass  Genito-Urinary ROS: no dysuria, trouble voiding  Musculoskeletal ROS: some pain in her lower legs  Neurological ROS: negative for - behavioral changes, memory loss, month          Wt Readings from Last 3 Encounters:   01/28/20 116 lb 12.8 oz (53 kg)   12/02/19 115 lb (52.2 kg)   11/15/19 113 lb (51.3 kg)         Amira Bell MD

## 2020-02-14 ENCOUNTER — OFFICE VISIT (OUTPATIENT)
Dept: OBGYN CLINIC | Age: 75
End: 2020-02-14
Payer: MEDICARE

## 2020-02-14 VITALS — SYSTOLIC BLOOD PRESSURE: 150 MMHG | DIASTOLIC BLOOD PRESSURE: 86 MMHG | BODY MASS INDEX: 20.3 KG/M2 | WEIGHT: 111 LBS

## 2020-02-14 PROCEDURE — 99213 OFFICE O/P EST LOW 20 MIN: CPT | Performed by: OBSTETRICS & GYNECOLOGY

## 2020-02-14 ASSESSMENT — ENCOUNTER SYMPTOMS
VOMITING: 0
NAUSEA: 0
DIARRHEA: 0
ABDOMINAL PAIN: 0
ALLERGIC/IMMUNOLOGIC NEGATIVE: 1
ABDOMINAL DISTENTION: 0
EYES NEGATIVE: 1
BLOOD IN STOOL: 0
RECTAL PAIN: 0
CONSTIPATION: 0
RESPIRATORY NEGATIVE: 1
ANAL BLEEDING: 0

## 2020-02-14 NOTE — PROGRESS NOTES
file     Active member of club or organization: Not on file     Attends meetings of clubs or organizations: Not on file     Relationship status: Not on file    Intimate partner violence:     Fear of current or ex partner: Not on file     Emotionally abused: Not on file     Physically abused: Not on file     Forced sexual activity: Not on file   Other Topics Concern    Not on file   Social History Narrative    Not on file        Family History   Problem Relation Age of Onset    Arthritis Other     Heart Disease Other     High Blood Pressure Other     Kidney Disease Other     Other Other         respiratory problems    Breast Cancer Neg Hx     Colon Cancer Neg Hx     Diabetes Neg Hx     Cancer Neg Hx     Eclampsia Neg Hx     Ovarian Cancer Neg Hx     Hypertension Neg Hx      Labor Neg Hx     Spont Abortions Neg Hx     Stroke Neg Hx        Review of Systems   Constitutional: Negative. Negative for activity change, appetite change, chills, diaphoresis, fatigue, fever and unexpected weight change. HENT: Negative. Eyes: Negative. Respiratory: Negative. Cardiovascular: Negative. Gastrointestinal: Negative for abdominal distention, abdominal pain, anal bleeding, blood in stool, constipation, diarrhea, nausea, rectal pain and vomiting. Endocrine: Negative. Genitourinary: Positive for vaginal bleeding (Spotting with pessary). Negative for decreased urine volume, difficulty urinating, dyspareunia, dysuria, enuresis, flank pain, frequency, genital sores, hematuria, menstrual problem, pelvic pain, urgency, vaginal discharge and vaginal pain. Musculoskeletal: Negative. Skin: Negative. Allergic/Immunologic: Negative. Neurological: Negative. Hematological: Negative. Psychiatric/Behavioral: Negative. Objective:     Physical Exam  Constitutional:       General: She is not in acute distress. Appearance: She is well-developed. She is not diaphoretic.    HENT:

## 2020-02-28 ENCOUNTER — OFFICE VISIT (OUTPATIENT)
Dept: FAMILY MEDICINE CLINIC | Age: 75
End: 2020-02-28
Payer: MEDICARE

## 2020-02-28 VITALS
WEIGHT: 106.8 LBS | HEART RATE: 59 BPM | DIASTOLIC BLOOD PRESSURE: 80 MMHG | OXYGEN SATURATION: 91 % | SYSTOLIC BLOOD PRESSURE: 130 MMHG | BODY MASS INDEX: 19.65 KG/M2 | HEIGHT: 62 IN

## 2020-02-28 PROCEDURE — 96372 THER/PROPH/DIAG INJ SC/IM: CPT | Performed by: FAMILY MEDICINE

## 2020-02-28 PROCEDURE — 99213 OFFICE O/P EST LOW 20 MIN: CPT | Performed by: FAMILY MEDICINE

## 2020-02-28 RX ORDER — CLONAZEPAM 0.5 MG/1
0.5 TABLET ORAL NIGHTLY PRN
Qty: 30 TABLET | Refills: 2 | Status: SHIPPED | OUTPATIENT
Start: 2020-02-28 | End: 2020-06-04 | Stop reason: SDUPTHER

## 2020-02-28 RX ORDER — CYANOCOBALAMIN 1000 UG/ML
1000 INJECTION INTRAMUSCULAR; SUBCUTANEOUS ONCE
Status: COMPLETED | OUTPATIENT
Start: 2020-02-28 | End: 2020-02-28

## 2020-02-28 RX ADMIN — CYANOCOBALAMIN 1000 MCG: 1000 INJECTION INTRAMUSCULAR; SUBCUTANEOUS at 13:37

## 2020-02-28 NOTE — PROGRESS NOTES
Diagnosis Orders   1. Anxiety  clonazePAM (KLONOPIN) 0.5 MG tablet   2. Colon cancer screening  [address] Colon[address]   3. B12 deficiency  cyanocobalamin injection 1,000 mcg   4. Transient cerebral ischemia, unspecified type     5. Chronic obstructive pulmonary disease, unspecified COPD type ([facility]     6. Essential hypertension       Administrations This Visit     cyanocobalamin injection 1,000 mcg     Admin Date  02/28/2020 Action  Given Dose  1000 mcg Route  Intramuscular Administered By  [employer], [facility] ([facility])              Injection was given in left arm. Patient tolerated well.

## 2020-03-06 ENCOUNTER — NURSE TRIAGE (OUTPATIENT)
Dept: OTHER | Facility: CLINIC | Age: 75
End: 2020-03-06

## 2020-03-06 RX ORDER — ALBUTEROL SULFATE 90 UG/1
2 AEROSOL, METERED RESPIRATORY (INHALATION) EVERY 6 HOURS PRN
Qty: 3 INHALER | Refills: 3 | Status: SHIPPED | OUTPATIENT
Start: 2020-03-06 | End: 2020-04-02 | Stop reason: SDUPTHER

## 2020-03-09 ENCOUNTER — OFFICE VISIT (OUTPATIENT)
Dept: FAMILY MEDICINE CLINIC | Age: 75
End: 2020-03-09
Payer: MEDICARE

## 2020-03-09 VITALS
TEMPERATURE: 98.5 F | DIASTOLIC BLOOD PRESSURE: 90 MMHG | HEART RATE: 87 BPM | WEIGHT: 106 LBS | OXYGEN SATURATION: 98 % | HEIGHT: 62 IN | SYSTOLIC BLOOD PRESSURE: 162 MMHG | BODY MASS INDEX: 19.51 KG/M2

## 2020-03-09 DIAGNOSIS — R20.2 TINGLING: ICD-10-CM

## 2020-03-09 DIAGNOSIS — R63.4 WEIGHT LOSS: ICD-10-CM

## 2020-03-09 LAB
ALBUMIN SERPL-MCNC: 3.5 G/DL (ref 3.5–4.6)
ALP BLD-CCNC: 79 U/L (ref 40–130)
ALT SERPL-CCNC: 13 U/L (ref 0–33)
ANION GAP SERPL CALCULATED.3IONS-SCNC: 20 MEQ/L (ref 9–15)
AST SERPL-CCNC: 20 U/L (ref 0–35)
BILIRUB SERPL-MCNC: <0.2 MG/DL (ref 0.2–0.7)
BUN BLDV-MCNC: 17 MG/DL (ref 8–23)
CALCIUM SERPL-MCNC: 6.2 MG/DL (ref 8.5–9.9)
CHLORIDE BLD-SCNC: 107 MEQ/L (ref 95–107)
CO2: 17 MEQ/L (ref 20–31)
CREAT SERPL-MCNC: 1.26 MG/DL (ref 0.5–0.9)
GFR AFRICAN AMERICAN: 50.3
GFR NON-AFRICAN AMERICAN: 41.5
GLOBULIN: 3.6 G/DL (ref 2.3–3.5)
GLUCOSE BLD-MCNC: 66 MG/DL (ref 70–99)
MAGNESIUM: 0.7 MG/DL (ref 1.7–2.4)
PHOSPHORUS: 3 MG/DL (ref 2.3–4.8)
POTASSIUM SERPL-SCNC: 3.1 MEQ/L (ref 3.4–4.9)
SODIUM BLD-SCNC: 144 MEQ/L (ref 135–144)
TOTAL PROTEIN: 7.1 G/DL (ref 6.3–8)
TSH REFLEX: 1.43 UIU/ML (ref 0.44–3.86)

## 2020-03-09 PROCEDURE — 99214 OFFICE O/P EST MOD 30 MIN: CPT | Performed by: FAMILY MEDICINE

## 2020-03-09 ASSESSMENT — ENCOUNTER SYMPTOMS
ABDOMINAL PAIN: 0
NAUSEA: 0
VOICE CHANGE: 0
TROUBLE SWALLOWING: 0
SHORTNESS OF BREATH: 0
CONSTIPATION: 0
COUGH: 0
DIARRHEA: 0
ABDOMINAL DISTENTION: 0
EYE DISCHARGE: 0
COLOR CHANGE: 0

## 2020-03-09 NOTE — PATIENT INSTRUCTIONS
It appears the patient is mostly needing comfort and reassurance that her electrolytes are normal.  So that has been ordered. Her weight appears to have stabilized. I think a recheck in 1 month to be sure this is remaining stable would be important. Described Raynauds phenomenon to son and patient and how to work on keeping hands neutral temperature to avoid triggering symptoms. Patient Education        Raynaud's Phenomenon: Care Instructions  Overview  Raynaud's is a condition that causes your hands and feet to overreact to cold. They may become painful and numb, and they can change colors, becoming very pale and then blue. This condition also is called Raynaud's phenomenon. There are two kinds of Raynaud's. Primary Raynaud's, also known as Raynaud's disease, happens by itself and is the most common form. Secondary Raynaud's, also called Raynaud's syndrome, happens as part of another disease. In Raynaud's, the small vessels that bring blood to the skin either become narrow, or constrict for a short period of time. This limits blood flow to the hands and feet and sometimes to the nose or ears. Your hands and feet feel cold and numb and then turn very pale. As blood flow returns, your fingers and toes may turn red, and begin to throb and hurt. Raynaud's can be painful and annoying, but it usually does not cause serious problems. You can take simple steps to protect your hands and feet from the cold. If you have a bad case of Raynaud's and you cannot keep your hands and feet warm enough, your doctor may prescribe medicine. Follow-up care is a key part of your treatment and safety. Be sure to make and go to all appointments, and call your doctor if you are having problems. It's also a good idea to know your test results and keep a list of the medicines you take. How can you care for yourself at home?   To prevent Raynaud's episodes or ease symptoms  · Run warm water over your hands or feet to increase blood flow. Use another part of your body, such as your forearm, to make sure the water is not too hot; you could burn your hands or feet and not feel it because they are numb. · Swing your arms in a [place] at the sides of your body (\"windmilling\") to increase blood flow. · If your doctor prescribes medicine to help Raynaud's, take it exactly as prescribed. Call your doctor if you think you are having a problem with your medicine. · If another condition causes your Raynaud's, make sure to follow your treatment for that condition. · Wear mittens or gloves when it is cold outside. Mittens are warmer than gloves because they keep your fingers together. Gloves underneath mittens will keep your hands warmer than gloves alone. You also can use pot holders or oven mitts when getting something from the freezer or refrigerator. · You can slip chemical hand warmers into your mittens or gloves when you do outside activities. · Do not smoke. Nicotine makes blood vessels constrict, which can bring on an attack. If you need help quitting, talk to your doctor about stop-smoking programs and medicines. These can increase your chances of quitting for good. · Avoid caffeine and cold medicines that have pseudoephedrine. They make blood vessels constrict. Beta-blocker medicines, often used to treat high blood pressure, also can make Raynaud's worse. · Drink plenty of fluids to prevent dehydration, which can lower the amount of blood moving through the blood vessels. If you have kidney, heart, or liver disease and have to limit fluids, talk with your doctor before you increase the amount of fluids you drink. · Try to stay calm when you are under stress. Anxiety can make your blood vessels constrict and lead to a Raynaud's attack. To keep your whole body warm  · Eat a hot meal and drink a warm liquid before going outside. They may help raise your body temperature. · Wear layers of warm clothing.  The inner layer should be made

## 2020-03-09 NOTE — PROGRESS NOTES
Subjective:      Patient ID: Rudy Arriaga is a 69 y.o. female who presents for:  Chief Complaint   Patient presents with    Numbness     feet and hands - comes and goes- and left side of face had some tingling - Pt requesting lab work    Dynex     pt lost  1 month ago- poor appetite        Sensation in fingers and in the hands a little and feel different. She has had this off and on for months. History of electrolyte abnormalities. History of gastric bypass. She is very worried that the electrolyte abnormalities could be causing this problem. Her son states she has not been eating well. He said she tells him her feet and hands are tingling and he notices that she is losing weight. Neither of them have paid attention to whether or not she has color changes in the fingers. Chart was reviewed and in the last 2 months she has lost 10 pounds however she has remained stable for the last 10 days. They have added Ensure to her diet routinely. Current Outpatient Medications on File Prior to Visit   Medication Sig Dispense Refill    albuterol sulfate HFA (VENTOLIN HFA) 108 (90 Base) MCG/ACT inhaler Inhale 2 puffs into the lungs every 6 hours as needed for Wheezing 3 Inhaler 3    clonazePAM (KLONOPIN) 0.5 MG tablet Take 1 tablet by mouth nightly as needed for Anxiety for up to 90 days.  30 tablet 2    albuterol (PROVENTIL) (2.5 MG/3ML) 0.083% nebulizer solution Take 3 mLs by nebulization every 4 hours as needed for Wheezing 120 each 3    donepezil (ARICEPT) 5 MG tablet Take 1 tablet by mouth nightly 30 tablet 5    losartan-hydrochlorothiazide (HYZAAR) 100-25 MG per tablet Take 1 tablet by mouth daily 90 tablet 3    metroNIDAZOLE (FLAGYL) 500 MG tablet TAKE 1 TABLET THREE TIMES A DAY FOR 10 DAYS PER MONTH 90 tablet 4    Oxyquinoline-Sod Lauryl Sulf (TRIMO-SONG) 0.025-0.01 % GEL Place 113.4 g vaginally Twice a Week 113.4 g 3    clopidogrel (PLAVIX) 75 MG tablet Take 1 tablet by mouth daily 90 tablet 3    clotrimazole-betamethasone (LOTRISONE) 1-0.05 % cream Apply topically 2 times daily. 45 g 5    lipase-protease-amylase (CREON) 86019-31679 units delayed release capsule Take 1 capsule by mouth 2 times daily 180 capsule 3    Magnesium Oxide 400 MG CAPS Take 400 mg by mouth 2 times daily 180 capsule 3    pantoprazole (PROTONIX) 40 MG tablet Take 1 tablet by mouth daily 90 tablet 3    mupirocin (BACTROBAN) 2 % ointment MICHAELLE TOPICALLY AA TID FOR 10 DAYS 15 g 0    propranolol (INDERAL) 40 MG tablet Take 1 tablet by mouth 2 times daily 180 tablet 3    sertraline (ZOLOFT) 25 MG tablet Take 1 tablet by mouth daily 90 tablet 3     No current facility-administered medications on file prior to visit.       Past Medical History:   Diagnosis Date    Bowel disease     Cataract     Chronic back pain     COPD (chronic obstructive pulmonary disease) (HCC)     Dizziness and giddiness     GERD (gastroesophageal reflux disease)     Glaucoma     Hypertension     Osteoarthritis     Pancreatitis     Seizures (HCC)     TIA (transient ischemic attack)      Past Surgical History:   Procedure Laterality Date    APPENDECTOMY      CHOLECYSTECTOMY      INTESTINAL BYPASS      jejunoileal     Social History     Socioeconomic History    Marital status:      Spouse name: Not on file    Number of children: Not on file    Years of education: Not on file    Highest education level: Not on file   Occupational History    Not on file   Social Needs    Financial resource strain: Not on file    Food insecurity     Worry: Not on file     Inability: Not on file   [employer] needs     Medical: Not on file     Non-medical: Not on file   Tobacco Use    Smoking status: Current Every Day Smoker     Packs/day: 2.00     Years: 54.00     Pack years: 108.00     Types: Cigarettes    Smokeless tobacco: Never Used   Substance and Sexual Activity    Alcohol use: No     Alcohol/week: 0.0 standard drinks    Drug use: No    Sexual activity: Not Currently     Partners: Male     Comment:    Lifestyle    Physical activity     Days per week: Not on file     Minutes per session: Not on file    Stress: Not on file   Relationships    Social connections     Talks on phone: Not on file     Gets together: Not on file     Attends Mandaen service: Not on file     Active member of club or organization: Not on file     Attends meetings of clubs or organizations: Not on file     Relationship status: Not on file    Intimate partner violence     Fear of current or ex partner: Not on file     Emotionally abused: Not on file     Physically abused: Not on file     Forced sexual activity: Not on file   Other Topics Concern    Not on file   Social History Narrative    Not on file     Family History   Problem Relation Age of Onset    Arthritis Other     Heart Disease Other     High Blood Pressure Other     Kidney Disease Other     Other Other         respiratory problems    Breast Cancer Neg Hx     Colon Cancer Neg Hx     Diabetes Neg Hx     Cancer Neg Hx     Eclampsia Neg Hx     Ovarian Cancer Neg Hx     Hypertension Neg Hx      Labor Neg Hx     Spont Abortions Neg Hx     Stroke Neg Hx      Allergies:  Aspirin; Penicillins; and Adhesive tape    Review of Systems   Constitutional: Positive for appetite change (decreased,  has ensure) and unexpected weight change. Negative for activity change and fatigue. HENT: Negative for congestion, dental problem, trouble swallowing and voice change. Eyes: Negative for discharge and visual disturbance. Respiratory: Negative for cough and shortness of breath. Cardiovascular: Negative for chest pain. Gastrointestinal: Negative for abdominal distention, abdominal pain, constipation, diarrhea and nausea. Endocrine: Negative for polydipsia and polyuria. Genitourinary: Negative for dysuria and urgency. Musculoskeletal: Negative for gait problem and joint swelling. Skin: Negative for color change and rash. Allergic/Immunologic: Negative for environmental allergies and food allergies. Neurological: Negative for speech difficulty and weakness. Hematological: Does not bruise/bleed easily. Psychiatric/Behavioral: Positive for confusion (dementia). Negative for agitation and behavioral problems. Objective:   BP (!) 162/90   Pulse 87   Temp 98.5 °F (36.9 °C)   Ht 5' 2\" (1.575 m)   Wt 106 lb (48.1 kg)   SpO2 98%   BMI 19.39 kg/m²     Physical Exam  Constitutional:       General: She is not in acute distress. Appearance: She is well-developed. She is not diaphoretic. HENT:      Head: Normocephalic and atraumatic. Right Ear: External ear normal.      Left Ear: External ear normal.      Nose: Nose normal.   Eyes:      General:         Right eye: No discharge. Left eye: No discharge. Conjunctiva/sclera: Conjunctivae normal.      Pupils: Pupils are equal, round, and reactive to light. Neck:      Musculoskeletal: Neck supple. Thyroid: No thyromegaly. Trachea: No tracheal deviation. Cardiovascular:      Rate and Rhythm: Normal rate and regular rhythm. Pulmonary:      Effort: Pulmonary effort is normal. No respiratory distress. Abdominal:      General: There is no distension. Skin:     General: Skin is warm and dry. Findings: No bruising or rash. Comments: Fingers are slightly erythematous from the MCP joint to the tips. No blanching noted. No purple discoloration noted. Neurological:      General: No focal deficit present. Mental Status: She is alert. Coordination: Coordination normal.   Psychiatric:         Thought Content: Thought content normal.         Judgment: Judgment normal.         No results found for this visit on 03/09/20. No results found for this or any previous visit (from the past 2016 hour(s)). Assessment:       Diagnosis Orders   1.  Weight loss  Comprehensive Metabolic Panel

## 2020-03-10 ENCOUNTER — TELEPHONE (OUTPATIENT)
Dept: FAMILY MEDICINE CLINIC | Age: 75
End: 2020-03-10

## 2020-03-10 NOTE — TELEPHONE ENCOUNTER
Received call from lab regarding critical magnesium level at 0.7. After review of chart, it was noted that this has happened multiple times in the past for this patient. Pt has h/o gastric bypass surgery. Called patient who reported that she suspected this was an issue so she had actually already taken her magnesium supplement twice that day with the last dose being approx 1 hour prior to the phone call. Pt reported she felt fine with no abnormal symptoms whatsoever. Advised patient to plan on increasing her magnesium to TID for at least the next day, but that I would send this to Dr. Bela Yeh in the morning for further recommendations. Pt aware of plan and denies further questions.

## 2020-03-11 RX ORDER — POTASSIUM CHLORIDE 20 MEQ/1
TABLET, EXTENDED RELEASE ORAL
Qty: 180 TABLET | Refills: 1 | Status: SHIPPED | OUTPATIENT
Start: 2020-03-11 | End: 2020-06-08

## 2020-03-11 RX ORDER — POTASSIUM CHLORIDE 20 MEQ/1
20 TABLET, EXTENDED RELEASE ORAL 2 TIMES DAILY
Qty: 60 TABLET | Refills: 2 | Status: SHIPPED | OUTPATIENT
Start: 2020-03-11 | End: 2020-03-11

## 2020-03-11 RX ORDER — CALCIUM CARBONATE/VITAMIN D3 500-10/5ML
400 LIQUID (ML) ORAL 3 TIMES DAILY
Qty: 270 CAPSULE | Refills: 3 | Status: SHIPPED | OUTPATIENT
Start: 2020-03-11 | End: 2021-05-17 | Stop reason: SDUPTHER

## 2020-03-11 NOTE — TELEPHONE ENCOUNTER
Orders Placed This Encounter   Medications    Magnesium Oxide 400 MG CAPS     Sig: Take 400 mg by mouth 3 times daily     Dispense:  270 capsule     Refill:  3       The above med(s) were e-scripted to the patient's pharmacy.    Please advise patient  Christy Soler MD

## 2020-03-16 DIAGNOSIS — E83.42 HYPOMAGNESEMIA: ICD-10-CM

## 2020-03-16 DIAGNOSIS — E87.6 HYPOKALEMIA: ICD-10-CM

## 2020-03-16 LAB
ANION GAP SERPL CALCULATED.3IONS-SCNC: 20 MEQ/L (ref 9–15)
BUN BLDV-MCNC: 37 MG/DL (ref 8–23)
CALCIUM SERPL-MCNC: 9.7 MG/DL (ref 8.5–9.9)
CHLORIDE BLD-SCNC: 102 MEQ/L (ref 95–107)
CO2: 17 MEQ/L (ref 20–31)
CREAT SERPL-MCNC: 1.94 MG/DL (ref 0.5–0.9)
GFR AFRICAN AMERICAN: 30.5
GFR NON-AFRICAN AMERICAN: 25.2
GLUCOSE BLD-MCNC: 110 MG/DL (ref 70–99)
MAGNESIUM: 1.5 MG/DL (ref 1.7–2.4)
POTASSIUM SERPL-SCNC: 3.6 MEQ/L (ref 3.4–4.9)
SODIUM BLD-SCNC: 139 MEQ/L (ref 135–144)

## 2020-03-23 ENCOUNTER — TELEPHONE (OUTPATIENT)
Dept: FAMILY MEDICINE CLINIC | Age: 75
End: 2020-03-23

## 2020-03-23 ENCOUNTER — APPOINTMENT (OUTPATIENT)
Dept: GENERAL RADIOLOGY | Age: 75
DRG: 190 | End: 2020-03-23
Payer: MEDICARE

## 2020-03-23 ENCOUNTER — HOSPITAL ENCOUNTER (INPATIENT)
Age: 75
LOS: 2 days | Discharge: HOME | DRG: 190 | End: 2020-03-25
Attending: EMERGENCY MEDICINE | Admitting: INTERNAL MEDICINE
Payer: MEDICARE

## 2020-03-23 PROBLEM — J44.1 COPD EXACERBATION (HCC): Status: ACTIVE | Noted: 2020-03-23

## 2020-03-23 LAB
ALBUMIN SERPL-MCNC: 3.9 G/DL (ref 3.5–4.6)
ALP BLD-CCNC: 102 U/L (ref 40–130)
ALT SERPL-CCNC: 15 U/L (ref 0–33)
ANION GAP SERPL CALCULATED.3IONS-SCNC: 16 MEQ/L (ref 9–15)
AST SERPL-CCNC: 19 U/L (ref 0–35)
BASE EXCESS ARTERIAL: -12 (ref -3–3)
BILIRUB SERPL-MCNC: 0.3 MG/DL (ref 0.2–0.7)
BUN BLDV-MCNC: 47 MG/DL (ref 8–23)
CALCIUM SERPL-MCNC: 10.1 MG/DL (ref 8.5–9.9)
CHLORIDE BLD-SCNC: 101 MEQ/L (ref 95–107)
CO2: 18 MEQ/L (ref 20–31)
CREAT SERPL-MCNC: 2.38 MG/DL (ref 0.5–0.9)
EKG ATRIAL RATE: 73 BPM
EKG P AXIS: 74 DEGREES
EKG P-R INTERVAL: 106 MS
EKG Q-T INTERVAL: 398 MS
EKG QRS DURATION: 100 MS
EKG QTC CALCULATION (BAZETT): 438 MS
EKG R AXIS: 36 DEGREES
EKG T AXIS: 70 DEGREES
EKG VENTRICULAR RATE: 73 BPM
GFR AFRICAN AMERICAN: 24.1
GFR NON-AFRICAN AMERICAN: 19.9
GLOBULIN: 4.2 G/DL (ref 2.3–3.5)
GLUCOSE BLD-MCNC: 129 MG/DL (ref 70–99)
HCO3 ARTERIAL: 14.6 MMOL/L (ref 21–29)
HCT VFR BLD CALC: 46.7 % (ref 37–47)
HEMOGLOBIN: 15.9 G/DL (ref 12–16)
LACTATE: 0.61 MMOL/L (ref 0.4–2)
MAGNESIUM: 2.1 MG/DL (ref 1.7–2.4)
MCH RBC QN AUTO: 32.4 PG (ref 27–31.3)
MCHC RBC AUTO-ENTMCNC: 34.1 % (ref 33–37)
MCV RBC AUTO: 95 FL (ref 82–100)
O2 SAT, ARTERIAL: 97 % (ref 93–100)
PCO2 ARTERIAL: 30 MM HG (ref 35–45)
PDW BLD-RTO: 14.1 % (ref 11.5–14.5)
PERFORMED ON: ABNORMAL
PH ARTERIAL: 7.3 (ref 7.35–7.45)
PLATELET # BLD: 345 K/UL (ref 130–400)
PO2 ARTERIAL: 102 MM HG (ref 75–108)
POC SAMPLE TYPE: ABNORMAL
POTASSIUM SERPL-SCNC: 4.7 MEQ/L (ref 3.4–4.9)
PROCALCITONIN: 0.17 NG/ML (ref 0–0.15)
RBC # BLD: 4.92 M/UL (ref 4.2–5.4)
SODIUM BLD-SCNC: 135 MEQ/L (ref 135–144)
TCO2 ARTERIAL: 16 (ref 22–29)
TOTAL PROTEIN: 8.1 G/DL (ref 6.3–8)
WBC # BLD: 9.5 K/UL (ref 4.8–10.8)

## 2020-03-23 PROCEDURE — 2580000003 HC RX 258: Performed by: PHYSICIAN ASSISTANT

## 2020-03-23 PROCEDURE — 6360000002 HC RX W HCPCS: Performed by: EMERGENCY MEDICINE

## 2020-03-23 PROCEDURE — 96366 THER/PROPH/DIAG IV INF ADDON: CPT

## 2020-03-23 PROCEDURE — 85027 COMPLETE CBC AUTOMATED: CPT

## 2020-03-23 PROCEDURE — 96375 TX/PRO/DX INJ NEW DRUG ADDON: CPT

## 2020-03-23 PROCEDURE — 96365 THER/PROPH/DIAG IV INF INIT: CPT

## 2020-03-23 PROCEDURE — G0378 HOSPITAL OBSERVATION PER HR: HCPCS

## 2020-03-23 PROCEDURE — 6370000000 HC RX 637 (ALT 250 FOR IP): Performed by: EMERGENCY MEDICINE

## 2020-03-23 PROCEDURE — 1210000000 HC MED SURG R&B

## 2020-03-23 PROCEDURE — 80053 COMPREHEN METABOLIC PANEL: CPT

## 2020-03-23 PROCEDURE — 2580000003 HC RX 258: Performed by: EMERGENCY MEDICINE

## 2020-03-23 PROCEDURE — 83605 ASSAY OF LACTIC ACID: CPT

## 2020-03-23 PROCEDURE — 36600 WITHDRAWAL OF ARTERIAL BLOOD: CPT

## 2020-03-23 PROCEDURE — 94640 AIRWAY INHALATION TREATMENT: CPT

## 2020-03-23 PROCEDURE — 93005 ELECTROCARDIOGRAM TRACING: CPT | Performed by: EMERGENCY MEDICINE

## 2020-03-23 PROCEDURE — 71045 X-RAY EXAM CHEST 1 VIEW: CPT

## 2020-03-23 PROCEDURE — 99285 EMERGENCY DEPT VISIT HI MDM: CPT

## 2020-03-23 PROCEDURE — 96374 THER/PROPH/DIAG INJ IV PUSH: CPT

## 2020-03-23 PROCEDURE — 36415 COLL VENOUS BLD VENIPUNCTURE: CPT

## 2020-03-23 PROCEDURE — 87040 BLOOD CULTURE FOR BACTERIA: CPT

## 2020-03-23 PROCEDURE — 6360000002 HC RX W HCPCS: Performed by: PHYSICIAN ASSISTANT

## 2020-03-23 PROCEDURE — 96376 TX/PRO/DX INJ SAME DRUG ADON: CPT

## 2020-03-23 PROCEDURE — 83735 ASSAY OF MAGNESIUM: CPT

## 2020-03-23 PROCEDURE — 94760 N-INVAS EAR/PLS OXIMETRY 1: CPT

## 2020-03-23 PROCEDURE — 84145 PROCALCITONIN (PCT): CPT

## 2020-03-23 PROCEDURE — 82803 BLOOD GASES ANY COMBINATION: CPT

## 2020-03-23 PROCEDURE — 94664 DEMO&/EVAL PT USE INHALER: CPT

## 2020-03-23 RX ORDER — PROMETHAZINE HYDROCHLORIDE 12.5 MG/1
12.5 TABLET ORAL EVERY 6 HOURS PRN
Status: DISCONTINUED | OUTPATIENT
Start: 2020-03-23 | End: 2020-03-25 | Stop reason: HOSPADM

## 2020-03-23 RX ORDER — METHYLPREDNISOLONE SODIUM SUCCINATE 40 MG/ML
40 INJECTION, POWDER, LYOPHILIZED, FOR SOLUTION INTRAMUSCULAR; INTRAVENOUS EVERY 6 HOURS
Status: DISCONTINUED | OUTPATIENT
Start: 2020-03-24 | End: 2020-03-24

## 2020-03-23 RX ORDER — SODIUM CHLORIDE 0.9 % (FLUSH) 0.9 %
10 SYRINGE (ML) INJECTION EVERY 12 HOURS SCHEDULED
Status: DISCONTINUED | OUTPATIENT
Start: 2020-03-23 | End: 2020-03-25 | Stop reason: HOSPADM

## 2020-03-23 RX ORDER — IPRATROPIUM BROMIDE AND ALBUTEROL SULFATE 2.5; .5 MG/3ML; MG/3ML
1 SOLUTION RESPIRATORY (INHALATION)
Status: DISCONTINUED | OUTPATIENT
Start: 2020-03-23 | End: 2020-03-23 | Stop reason: HOSPADM

## 2020-03-23 RX ORDER — POLYETHYLENE GLYCOL 3350 17 G/17G
17 POWDER, FOR SOLUTION ORAL DAILY PRN
Status: DISCONTINUED | OUTPATIENT
Start: 2020-03-23 | End: 2020-03-25 | Stop reason: HOSPADM

## 2020-03-23 RX ORDER — SODIUM CHLORIDE 9 MG/ML
INJECTION, SOLUTION INTRAVENOUS CONTINUOUS
Status: DISCONTINUED | OUTPATIENT
Start: 2020-03-23 | End: 2020-03-24

## 2020-03-23 RX ORDER — ONDANSETRON 2 MG/ML
4 INJECTION INTRAMUSCULAR; INTRAVENOUS EVERY 6 HOURS PRN
Status: DISCONTINUED | OUTPATIENT
Start: 2020-03-23 | End: 2020-03-25 | Stop reason: HOSPADM

## 2020-03-23 RX ORDER — ACETAMINOPHEN 325 MG/1
650 TABLET ORAL EVERY 6 HOURS PRN
Status: DISCONTINUED | OUTPATIENT
Start: 2020-03-23 | End: 2020-03-25 | Stop reason: HOSPADM

## 2020-03-23 RX ORDER — IPRATROPIUM BROMIDE AND ALBUTEROL SULFATE 2.5; .5 MG/3ML; MG/3ML
1 SOLUTION RESPIRATORY (INHALATION)
Status: DISCONTINUED | OUTPATIENT
Start: 2020-03-24 | End: 2020-03-25 | Stop reason: HOSPADM

## 2020-03-23 RX ORDER — SODIUM CHLORIDE 0.9 % (FLUSH) 0.9 %
10 SYRINGE (ML) INJECTION PRN
Status: DISCONTINUED | OUTPATIENT
Start: 2020-03-23 | End: 2020-03-25 | Stop reason: HOSPADM

## 2020-03-23 RX ORDER — 0.9 % SODIUM CHLORIDE 0.9 %
1000 INTRAVENOUS SOLUTION INTRAVENOUS ONCE
Status: COMPLETED | OUTPATIENT
Start: 2020-03-23 | End: 2020-03-23

## 2020-03-23 RX ORDER — METHYLPREDNISOLONE SODIUM SUCCINATE 125 MG/2ML
125 INJECTION, POWDER, LYOPHILIZED, FOR SOLUTION INTRAMUSCULAR; INTRAVENOUS ONCE
Status: COMPLETED | OUTPATIENT
Start: 2020-03-23 | End: 2020-03-23

## 2020-03-23 RX ORDER — PREDNISONE 20 MG/1
40 TABLET ORAL DAILY
Status: DISCONTINUED | OUTPATIENT
Start: 2020-03-26 | End: 2020-03-24

## 2020-03-23 RX ORDER — ACETAMINOPHEN 650 MG/1
650 SUPPOSITORY RECTAL EVERY 6 HOURS PRN
Status: DISCONTINUED | OUTPATIENT
Start: 2020-03-23 | End: 2020-03-25 | Stop reason: HOSPADM

## 2020-03-23 RX ADMIN — Medication 10 ML: at 23:21

## 2020-03-23 RX ADMIN — METHYLPREDNISOLONE SODIUM SUCCINATE 40 MG: 40 INJECTION, POWDER, FOR SOLUTION INTRAMUSCULAR; INTRAVENOUS at 23:14

## 2020-03-23 RX ADMIN — SODIUM CHLORIDE: 9 INJECTION, SOLUTION INTRAVENOUS at 23:13

## 2020-03-23 RX ADMIN — SODIUM CHLORIDE 1000 ML: 9 INJECTION, SOLUTION INTRAVENOUS at 18:24

## 2020-03-23 RX ADMIN — IPRATROPIUM BROMIDE AND ALBUTEROL SULFATE 1 AMPULE: .5; 3 SOLUTION RESPIRATORY (INHALATION) at 19:02

## 2020-03-23 RX ADMIN — METHYLPREDNISOLONE SODIUM SUCCINATE 125 MG: 125 INJECTION, POWDER, FOR SOLUTION INTRAMUSCULAR; INTRAVENOUS at 18:24

## 2020-03-23 RX ADMIN — AZITHROMYCIN MONOHYDRATE 500 MG: 500 INJECTION, POWDER, LYOPHILIZED, FOR SOLUTION INTRAVENOUS at 20:05

## 2020-03-23 ASSESSMENT — ENCOUNTER SYMPTOMS
EYE DISCHARGE: 0
SHORTNESS OF BREATH: 1
COLOR CHANGE: 0
PHOTOPHOBIA: 0
ABDOMINAL PAIN: 0
COUGH: 1
ABDOMINAL DISTENTION: 0
WHEEZING: 1
FACIAL SWELLING: 0
RHINORRHEA: 0
VOMITING: 0
NAUSEA: 0

## 2020-03-23 ASSESSMENT — PAIN SCALES - GENERAL: PAINLEVEL_OUTOF10: 6

## 2020-03-23 ASSESSMENT — PAIN DESCRIPTION - PAIN TYPE: TYPE: CHRONIC PAIN

## 2020-03-23 NOTE — ED PROVIDER NOTES
[address] [facility] ED  eMERGENCY dEPARTMENT eNCOUnter      Pt Name: Conchita Jackson  MRN: [id number]  [place] 1945  Date of evaluation: 3/23/2020  Provider: Joann Syed, [address] [place] [address]       Chief Complaint   Patient presents with    Fatigue     with sob x2 weeks. was told her calcium level is lower. HISTORY OF PRESENT ILLNESS   (Location/Symptom, Timing/Onset,Context/Setting, Quality, Duration, Modifying Factors, Severity)  Note limiting factors. Conchita Jackson is a 69 y.o. female who presents to the emergency department as a lifelong smoker with increased fatigue and shortness of breath developing over the last 2 weeks. She has had a wet cough but denies any fevers or chills. She states she normally is able to keep up her own household chores, however she has not wanted to get off the couch. She has been using her nebulizer and inhalers. Per squad her chief complaint was fatigue and having been told that she had low calcium. She was 86% on room air. She does not wear oxygen at home. HPI    NursingNotes were reviewed. REVIEW OF SYSTEMS    (2-9 systems for level 4, 10 or more for level 5)     Review of Systems   Constitutional: Positive for activity change, appetite change and fatigue. Negative for chills, diaphoresis and fever. HENT: Negative for congestion, facial swelling, rhinorrhea and sneezing. Eyes: Negative for photophobia and discharge. Respiratory: Positive for cough, shortness of breath and wheezing. Cardiovascular: Negative for chest pain and leg swelling. Gastrointestinal: Negative for abdominal distention, abdominal pain, nausea and vomiting. Endocrine: Negative for polydipsia and polyphagia. Genitourinary: Negative for difficulty urinating, frequency, vaginal bleeding and vaginal discharge. Musculoskeletal: Negative for gait problem. Skin: Negative for color change. Allergic/Immunologic: Negative for immunocompromised state.    Neurological: General: Skin is warm and dry. Coloration: Skin is not pale. Findings: No bruising. Neurological:      Mental Status: She is alert and oriented to person, place, and time. Motor: Weakness present. Deep Tendon Reflexes: Reflexes are normal and symmetric. DIAGNOSTIC RESULTS     EKG: All EKG's are interpreted by the Emergency Department Physician who either signs or Co-signsthis chart in the absence of a cardiologist.    Normal sinus rhythm 73 bpm no acute ischemia or ectopy    RADIOLOGY:   Non-plain filmimages such as CT, Ultrasound and MRI are read by the radiologist. Plain radiographic images are visualized and preliminarily interpreted by the emergency physician with the below findings:    Chest x-ray shows COPD changes. No infiltrate  Interpretation per the Radiologist below, if available at the time ofthis note:    XR CHEST STANDARD (2 VW)    (Results Pending)         ED BEDSIDE ULTRASOUND:   Performed by ED Physician - none    LABS:  Labs Reviewed   CULTURE, BLOOD 1   CULTURE, BLOOD 2   CBC   COMPREHENSIVE METABOLIC PANEL   PROCALCITONIN       All other labs were within normal range or not returned as of this dictation. EMERGENCY DEPARTMENT COURSE and DIFFERENTIAL DIAGNOSIS/MDM:   Vitals:    Vitals:    03/23/20 1729 03/23/20 1738   BP:  126/79   Pulse: 75 70   Resp: 20 18   Temp: 98.6 °F (37 °C)    TempSrc: Oral    SpO2: (!) 86% 98%   Weight: 106 lb (48.1 kg)    Height: 5' 2\" (1.575 m)        Patient comes in with 2 weeks of increasing pain on exertion. Long history of COPD. Severe wheezing and tightness. Patient did receive Solu-Medrol and magnesium en route. Patient will require much more treatment for this COPD exacerbation. Patient was started on some Zithromax. She has a penicillin allergy unspecified    MDM    CRITICAL CARE TIME   Total Critical Care time was 35 minutes, excluding separately reportableprocedures.   There was a high probability of clinicallysignificant/life threatening deterioration in the patient's condition which required my urgent intervention. Patient is hypoxic upon arrival.    CONSULTS:  None    PROCEDURES:  Unless otherwise noted below, none     Procedures    FINAL IMPRESSION      1. Chronic bronchitis with COPD (chronic obstructive pulmonary disease) (HCC)          DISPOSITION/PLAN   DISPOSITION  Admit      PATIENT REFERRED TO:  No follow-up provider specified.     DISCHARGE MEDICATIONS:  New Prescriptions    No medications on file          (Please note that portions of this note were completed with a voice recognition program.  Efforts were made to edit the dictations but occasionally words are mis-transcribed.)    Deandre Foreman DO (electronically signed)  Attending Emergency Physician          Mary Kay Ma DO  03/23/20 1951

## 2020-03-23 NOTE — TELEPHONE ENCOUNTER
ZAIDAI    Pt son calling  States pt has not eaten or had anything to drink in past 3 days. Only a little bit of pudding and a few sips of coffee  Pt is very lethargic and slurring words. Consulted with Analisa ARRINGTON    Pt son advised to take pt to ER.

## 2020-03-23 NOTE — ED NOTES
Bed: 08  Expected date: 3/23/20  Expected time: 5:18 PM  Means of arrival: [facility]  Comments:  75F- weakness, hypocalcemia, slurred speech normally. 076/28,41-41 NSR.      Geoff Erickson RN  03/23/20 8746

## 2020-03-24 ENCOUNTER — APPOINTMENT (OUTPATIENT)
Dept: CT IMAGING | Age: 75
DRG: 190 | End: 2020-03-24
Payer: MEDICARE

## 2020-03-24 LAB
ANION GAP SERPL CALCULATED.3IONS-SCNC: 16 MEQ/L (ref 9–15)
BASOPHILS ABSOLUTE: 0 K/UL (ref 0–0.2)
BASOPHILS RELATIVE PERCENT: 0.1 %
BUN BLDV-MCNC: 42 MG/DL (ref 8–23)
CALCIUM SERPL-MCNC: 8.9 MG/DL (ref 8.5–9.9)
CEA: 11.7 NG/ML (ref 0–5.5)
CHLORIDE BLD-SCNC: 110 MEQ/L (ref 95–107)
CO2: 13 MEQ/L (ref 20–31)
CREAT SERPL-MCNC: 2.1 MG/DL (ref 0.5–0.9)
EOSINOPHILS ABSOLUTE: 0 K/UL (ref 0–0.7)
EOSINOPHILS RELATIVE PERCENT: 0 %
GFR AFRICAN AMERICAN: 27.9
GFR NON-AFRICAN AMERICAN: 23
GLUCOSE BLD-MCNC: 123 MG/DL (ref 70–99)
HCT VFR BLD CALC: 40.1 % (ref 37–47)
HEMOGLOBIN: 13 G/DL (ref 12–16)
LYMPHOCYTES ABSOLUTE: 0.6 K/UL (ref 1–4.8)
LYMPHOCYTES RELATIVE PERCENT: 9.6 %
MCH RBC QN AUTO: 31.8 PG (ref 27–31.3)
MCHC RBC AUTO-ENTMCNC: 32.3 % (ref 33–37)
MCV RBC AUTO: 98.3 FL (ref 82–100)
MONOCYTES ABSOLUTE: 0 K/UL (ref 0.2–0.8)
MONOCYTES RELATIVE PERCENT: 0.6 %
NEUTROPHILS ABSOLUTE: 5.2 K/UL (ref 1.4–6.5)
NEUTROPHILS RELATIVE PERCENT: 89.7 %
PDW BLD-RTO: 14.7 % (ref 11.5–14.5)
PLATELET # BLD: 211 K/UL (ref 130–400)
POTASSIUM REFLEX MAGNESIUM: 5 MEQ/L (ref 3.4–4.9)
RBC # BLD: 4.08 M/UL (ref 4.2–5.4)
SODIUM BLD-SCNC: 139 MEQ/L (ref 135–144)
WBC # BLD: 5.8 K/UL (ref 4.8–10.8)

## 2020-03-24 PROCEDURE — G0378 HOSPITAL OBSERVATION PER HR: HCPCS

## 2020-03-24 PROCEDURE — 6370000000 HC RX 637 (ALT 250 FOR IP): Performed by: INTERNAL MEDICINE

## 2020-03-24 PROCEDURE — 96376 TX/PRO/DX INJ SAME DRUG ADON: CPT

## 2020-03-24 PROCEDURE — 2580000003 HC RX 258: Performed by: INTERNAL MEDICINE

## 2020-03-24 PROCEDURE — 2580000003 HC RX 258: Performed by: PHYSICIAN ASSISTANT

## 2020-03-24 PROCEDURE — 6360000002 HC RX W HCPCS: Performed by: PHYSICIAN ASSISTANT

## 2020-03-24 PROCEDURE — 99223 1ST HOSP IP/OBS HIGH 75: CPT | Performed by: INTERNAL MEDICINE

## 2020-03-24 PROCEDURE — 97166 OT EVAL MOD COMPLEX 45 MIN: CPT

## 2020-03-24 PROCEDURE — 85025 COMPLETE CBC W/AUTO DIFF WBC: CPT

## 2020-03-24 PROCEDURE — 71250 CT THORAX DX C-: CPT

## 2020-03-24 PROCEDURE — 94761 N-INVAS EAR/PLS OXIMETRY MLT: CPT

## 2020-03-24 PROCEDURE — 1210000000 HC MED SURG R&B

## 2020-03-24 PROCEDURE — 6370000000 HC RX 637 (ALT 250 FOR IP): Performed by: PHYSICIAN ASSISTANT

## 2020-03-24 PROCEDURE — 80048 BASIC METABOLIC PNL TOTAL CA: CPT

## 2020-03-24 PROCEDURE — 94640 AIRWAY INHALATION TREATMENT: CPT

## 2020-03-24 PROCEDURE — 96372 THER/PROPH/DIAG INJ SC/IM: CPT

## 2020-03-24 PROCEDURE — 97162 PT EVAL MOD COMPLEX 30 MIN: CPT

## 2020-03-24 PROCEDURE — 82378 CARCINOEMBRYONIC ANTIGEN: CPT

## 2020-03-24 PROCEDURE — 36415 COLL VENOUS BLD VENIPUNCTURE: CPT

## 2020-03-24 RX ORDER — SERTRALINE HYDROCHLORIDE 25 MG/1
25 TABLET, FILM COATED ORAL DAILY
Status: DISCONTINUED | OUTPATIENT
Start: 2020-03-24 | End: 2020-03-25 | Stop reason: HOSPADM

## 2020-03-24 RX ORDER — ALBUTEROL SULFATE 2.5 MG/3ML
2.5 SOLUTION RESPIRATORY (INHALATION)
Status: DISCONTINUED | OUTPATIENT
Start: 2020-03-24 | End: 2020-03-25 | Stop reason: HOSPADM

## 2020-03-24 RX ORDER — PREDNISONE 20 MG/1
40 TABLET ORAL DAILY
Status: DISCONTINUED | OUTPATIENT
Start: 2020-03-25 | End: 2020-03-25 | Stop reason: HOSPADM

## 2020-03-24 RX ORDER — SODIUM CHLORIDE 9 MG/ML
INJECTION, SOLUTION INTRAVENOUS CONTINUOUS
Status: DISCONTINUED | OUTPATIENT
Start: 2020-03-24 | End: 2020-03-25 | Stop reason: HOSPADM

## 2020-03-24 RX ORDER — PROPRANOLOL HYDROCHLORIDE 40 MG/1
40 TABLET ORAL 2 TIMES DAILY
Status: DISCONTINUED | OUTPATIENT
Start: 2020-03-24 | End: 2020-03-25 | Stop reason: HOSPADM

## 2020-03-24 RX ORDER — CLOPIDOGREL BISULFATE 75 MG/1
75 TABLET ORAL DAILY
Status: DISCONTINUED | OUTPATIENT
Start: 2020-03-24 | End: 2020-03-25 | Stop reason: HOSPADM

## 2020-03-24 RX ORDER — BUDESONIDE 0.25 MG/2ML
250 INHALANT ORAL 2 TIMES DAILY
Status: DISCONTINUED | OUTPATIENT
Start: 2020-03-24 | End: 2020-03-25 | Stop reason: HOSPADM

## 2020-03-24 RX ORDER — PANTOPRAZOLE SODIUM 40 MG/1
40 TABLET, DELAYED RELEASE ORAL DAILY
Status: DISCONTINUED | OUTPATIENT
Start: 2020-03-24 | End: 2020-03-25 | Stop reason: HOSPADM

## 2020-03-24 RX ORDER — DONEPEZIL HYDROCHLORIDE 5 MG/1
5 TABLET, FILM COATED ORAL NIGHTLY
Status: DISCONTINUED | OUTPATIENT
Start: 2020-03-24 | End: 2020-03-25 | Stop reason: HOSPADM

## 2020-03-24 RX ORDER — CLONAZEPAM 0.5 MG/1
0.5 TABLET ORAL NIGHTLY PRN
Status: DISCONTINUED | OUTPATIENT
Start: 2020-03-24 | End: 2020-03-25 | Stop reason: HOSPADM

## 2020-03-24 RX ORDER — POTASSIUM CHLORIDE 20 MEQ/1
20 TABLET, EXTENDED RELEASE ORAL 2 TIMES DAILY
Status: DISCONTINUED | OUTPATIENT
Start: 2020-03-24 | End: 2020-03-25 | Stop reason: HOSPADM

## 2020-03-24 RX ORDER — LANOLIN ALCOHOL/MO/W.PET/CERES
400 CREAM (GRAM) TOPICAL 3 TIMES DAILY
Status: DISCONTINUED | OUTPATIENT
Start: 2020-03-24 | End: 2020-03-25 | Stop reason: HOSPADM

## 2020-03-24 RX ADMIN — SODIUM CHLORIDE: 9 INJECTION, SOLUTION INTRAVENOUS at 15:06

## 2020-03-24 RX ADMIN — PANTOPRAZOLE SODIUM 40 MG: 40 TABLET, DELAYED RELEASE ORAL at 16:40

## 2020-03-24 RX ADMIN — IPRATROPIUM BROMIDE AND ALBUTEROL SULFATE 1 AMPULE: 2.5; .5 SOLUTION RESPIRATORY (INHALATION) at 10:40

## 2020-03-24 RX ADMIN — METHYLPREDNISOLONE SODIUM SUCCINATE 40 MG: 40 INJECTION, POWDER, FOR SOLUTION INTRAMUSCULAR; INTRAVENOUS at 05:42

## 2020-03-24 RX ADMIN — IPRATROPIUM BROMIDE AND ALBUTEROL SULFATE 1 AMPULE: 2.5; .5 SOLUTION RESPIRATORY (INHALATION) at 15:47

## 2020-03-24 RX ADMIN — Medication 400 MG: at 20:40

## 2020-03-24 RX ADMIN — PROPRANOLOL HYDROCHLORIDE 40 MG: 40 TABLET ORAL at 23:16

## 2020-03-24 RX ADMIN — Medication 10 ML: at 09:59

## 2020-03-24 RX ADMIN — ENOXAPARIN SODIUM 30 MG: 30 INJECTION SUBCUTANEOUS at 09:58

## 2020-03-24 RX ADMIN — PANCRELIPASE 24000 UNITS: 60000; 12000; 38000 CAPSULE, DELAYED RELEASE PELLETS ORAL at 20:40

## 2020-03-24 RX ADMIN — IPRATROPIUM BROMIDE AND ALBUTEROL SULFATE 1 AMPULE: 2.5; .5 SOLUTION RESPIRATORY (INHALATION) at 06:57

## 2020-03-24 RX ADMIN — SERTRALINE HYDROCHLORIDE 25 MG: 25 TABLET ORAL at 16:40

## 2020-03-24 RX ADMIN — POTASSIUM CHLORIDE 20 MEQ: 1500 TABLET, EXTENDED RELEASE ORAL at 16:40

## 2020-03-24 RX ADMIN — SODIUM CHLORIDE: 9 INJECTION, SOLUTION INTRAVENOUS at 16:40

## 2020-03-24 RX ADMIN — POTASSIUM CHLORIDE 20 MEQ: 1500 TABLET, EXTENDED RELEASE ORAL at 20:40

## 2020-03-24 RX ADMIN — CLOPIDOGREL BISULFATE 75 MG: 75 TABLET ORAL at 16:39

## 2020-03-24 RX ADMIN — IPRATROPIUM BROMIDE AND ALBUTEROL SULFATE 1 AMPULE: 2.5; .5 SOLUTION RESPIRATORY (INHALATION) at 20:09

## 2020-03-24 RX ADMIN — PANCRELIPASE 24000 UNITS: 60000; 12000; 38000 CAPSULE, DELAYED RELEASE PELLETS ORAL at 16:39

## 2020-03-24 RX ADMIN — DONEPEZIL HYDROCHLORIDE 5 MG: 5 TABLET, FILM COATED ORAL at 20:41

## 2020-03-24 RX ADMIN — PROPRANOLOL HYDROCHLORIDE 40 MG: 40 TABLET ORAL at 16:43

## 2020-03-24 RX ADMIN — Medication 400 MG: at 16:40

## 2020-03-24 ASSESSMENT — PAIN SCALES - GENERAL
PAINLEVEL_OUTOF10: 0

## 2020-03-24 NOTE — PROGRESS NOTES
Level: 0    Prior Level of Function:  Social/Functional History  Lives With: Alone  Type of Home: House  Home Layout: One level, Laundry in basement(kids do laundry, pt does not go to basement)  Home Access: Stairs to enter with rails  Entrance Stairs - Number of Steps: 3  Entrance Stairs - Rails: Both  Bathroom Shower/Tub: Tub/Shower unit  Home Equipment: 4 wheeled walker(pt unsure of which kind of walker- based on description, likely rollator or 3-WW)  ADL Assistance: [address] [place] [address] Avenue: Needs assistance(kids do most of groceries/laundry)  Ambulation Assistance: Independent(rollator)  Transfer Assistance: Independent  Active : No    OBJECTIVE:   Vision: Impaired  Vision Exceptions: Wears glasses at all times  Hearing: Within functional limits    Cognition:  Overall Orientation Status: Within Functional Limits  Follows Commands: Within Functional Limits    Observation/Palpation  Observation: kyphotic posture    ROM:  RLE AROM: WFL  LLE AROM : WFL    Strength:  Strength RLE  Strength RLE: WFL  Strength LLE  Strength LLE: WFL    Neuro:  Balance  Sitting - Static: Good  Sitting - Dynamic: Good  Standing - Static: Fair;+  Standing - Dynamic: Fair             Bed mobility  Supine to Sit: Supervision    Transfers  Sit to Stand: Stand by assistance  Stand to sit: Stand by assistance  Bed to Chair: Stand by assistance(pt tends to leave walker behind, decreased enviromental awareness but no LOB with mobility)    Ambulation  Ambulation?: Yes  Ambulation 1  Surface: level tile  Device: Rolling Walker  Assistance: Stand by assistance  Quality of Gait: flexed posture, shuffling steps, decreased step length  Distance: 60 ft  Comments: cues for posture, walker proximity, and safety with AD- pt tends to disregard cueing and suggestions stating the walker \"just really sucks\"              Activity Tolerance  Activity Tolerance: Patient Tolerated treatment well          PT Education  PT Education: Goals;PT Role;General Safety;Precautions    ASSESSMENT:   Body structures, Functions, Activity limitations: Decreased functional mobility ; Decreased strength;Decreased endurance;Decreased balance;Decreased safe awareness  Decision Making: Medium Complexity  History: medium  Exam: high  Clinical Presentation: medium    Prognosis: Good    DISCHARGE RECOMMENDATIONS:  Discharge Recommendations: Continue to assess pending progress    Assessment: Pt with above deficits though near her baseline. Continue PT to progress mobility and address impairments. Pt with decreased safety awareness and disregards the attempted education. REQUIRES PT FOLLOW UP: Yes      PLAN OF CARE:  Plan  Times per week: 1-3 f/u visits  Current Treatment Recommendations: Strengthening, Functional Mobility Training, Neuromuscular Re-education, Home Exercise Program, Equipment Evaluation, Education, & procurement, Transfer Training, Gait Training, Safety Education & Training, Balance Training, Endurance Training, Stair training, Patient/Caregiver Education & Training  Safety Devices  Type of devices: Call light within reach, Chair alarm in place    Goals:  Patient goals : to go home  Long term goals  Long term goal 1: pt to be indep with bed mobility  Long term goal 2: pt to be modified indep with transfers with good safety awareness  Long term goal 3: pt to ambulate >150 ft with rollator modified indep    [facility] (6 CLICK) [address] Inpatient Mobility Raw Score : 19     Therapy Time:   Individual   Time In 1135   Time Out 1155   Minutes [address] Del[address], PT, 03/24/20 at 1:16 PM         Definitions for assistance levels  Independent = pt does not require any physical supervision or assistance from another person for activity completion. Device may be needed.   Stand by assistance = pt requires verbal cues or instructions from another person, close to but not touching, to perform the activity  Minimal assistance= pt performs 75% or more of

## 2020-03-24 NOTE — PROGRESS NOTES
[facility] EMERGENCY [facility] AT [place] Respiratory Therapy Evaluation   Current Order:  Mary Anne Delgado q4wa     Home Regimen: qid       Ordering Physician: og   Re-evaluation Date:  3/26     Diagnosis: chronic bronchitis       Patient Status: Stable / Unstable + Physician notified    The following MDI Criteria must be met in order to convert aerosol to MDI with spacer.  If unable to meet, MDI will be converted to aerosol:  []  Patient able to demonstrate the ability to use MDI effectively  []  Patient alert and cooperative  []  Patient able to take deep breath with 5-10 second hold  []  Medication(s) available in this delivery method   []  Peak flow greater than or equal to 200 ml/min            Current Order Substituted To  (same drug, same frequency)   Aerosol to MDI [] Albuterol Sulfate 0.083% unit dose by aerosol Albuterol Sulfate MDI 2 puffs by inhalation with spacer    [] Levalbuterol 1.25 mg unit dose by aerosol Levalbuterol MDI 2 puffs by inhalation with spacer    [] Levalbuterol 0.63 mg unit dose by aerosol Levalbuterol MDI 2 puffs by inhalation with spacer    [] Ipratropium Bromide 0.02% unit dose by aerosol Ipratropium Bromide MDI 2 puffs by inhalation with spacer    [] Duoneb (Ipratropium + Albuterol) unit dose by aerosol Ipratropium MDI + Albuterol MDI 2 puffs by inhalation w/spacer   MDI to Aerosol [] Albuterol Sulfate MDI Albuterol Sulfate 0.083% unit dose by aerosol    [] Levalbuterol MDI 2 puffs by inhalation Levalbuterol 1.25 mg unit dose by aerosol    [] Ipratropium Bromide MDI by inhalation Ipratropium Bromide 0.02% unit dose by aerosol    [] Combivent (Ipratropium + Albuterol) MDI by inhalation Duoneb (Ipratropium + Albuterol) unit dose by aerosol   Treatment Assessment [Frequency/Schedule]:  Change frequency to: duoneb qid per home freq an albuterol q2 prn __________________________________________________per Protocol, P&T, [facility]      Points 0 1 2 3 4   Pulmonary Status  Non-Smoker  []   Smoking history   < 20 pack years  []

## 2020-03-24 NOTE — PROGRESS NOTES
Department of Internal Medicine  Progress Note      SUBJECTIVE: Voices improvement in shortness of breath. Currently on room air with sats more than 95%. She has been afebrile and hemodynamically stable. No acute events overnight.        ROS:  All 12 systems reviewed and negative except mentioned in HPI and Assessment and plan    MEDICATIONS:  Current Facility-Administered Medications   Medication Dose Route Frequency Provider Last Rate Last Dose    albuterol (PROVENTIL) nebulizer solution 2.5 mg  2.5 mg Nebulization Q2H PRN MD Tyree Carrerag Nikko Fruits ON 3/25/2020] predniSONE (DELTASONE) tablet 40 mg  40 mg Oral Daily Baldemar Marie MD        sodium chloride flush 0.9 % injection 10 mL  10 mL Intravenous 2 times per day KatieRAMAN Dumont   10 mL at 03/24/20 0959    sodium chloride flush 0.9 % injection 10 mL  10 mL Intravenous PRN Katieconsuelo Walker, [address]        acetaminophen (TYLENOL) tablet 650 mg  650 mg Oral Q6H PRN RAMAN Humphrey        Or    acetaminophen (TYLENOL) suppository 650 mg  650 mg Rectal Q6H PRN [place] Dum[place]        polyethylene glycol (GLYCOLAX) packet 17 g  17 g Oral Daily PRN RAMAN Humphrey        promethazine (PHENERGAN) tablet 12.5 mg  12.5 mg Oral Q6H PRN RAMAN Humphrey        Or    ondansetron [facility]) injection 4 mg  4 mg Intravenous Q6H PRN [place] Dumont        enoxaparin (LOVENOX) injection 30 mg  30 mg Subcutaneous Daily Katie Walker [address]   30 mg at 03/24/20 0958    ipratropium-albuterol (DUONEB) nebulizer solution 1 ampule  1 ampule Inhalation Q4H WA Katie Delcid, [address]   1 ampule at 03/24/20 1040         OBJECTIVE:  Vital Signs:  Vitals:    03/24/20 1042   BP:    Pulse:    Resp:    Temp:    SpO2: 95%       Focal exam:  No wheezing bilaterally    General Exam (except as mentioned above):  CONSTITUTIONAL: Awake, alert, no apparent distress  EYES:  PERRL, conjunctiva normal  ENT: Normocephalic, atraumatic  NECK:  Supple  BACK:  Symmetric  LUNGS:  CTAB except bilateral basilar crackles. CARDIOVASCULAR:  S1S2 present  ABDOMEN:  soft, non-distended, non-tender  MUSCULOSKELETAL:  There is no redness, warmth, or swelling of the joints. NEUROLOGIC:  Alert, awake, oriented x 3. No FND  EXTREMITIES: Warm and well perfused. LABS  Recent Labs     03/23/20  1745 03/24/20  0526   WBC 9.5 5.8   RBC 4.92 4.08*   HGB 15.9 13.0   HCT 46.7 40.1   MCV 95.0 98.3   MCH 32.4* 31.8*   MCHC 34.1 32.3*   RDW 14.1 14.7*    211       Recent Labs     03/23/20  1745 03/24/20  0526    139   K 4.7 5.0*    110*   CO2 18* 13*   BUN 47* 42*   CREATININE 2.38* 2.10*   GLUCOSE 129* 123*   CALCIUM 10.1* 8.9       Recent Labs     03/23/20  1745   MG 2.1           ASSESSMENT AND PLAN    Active Hospital Problems    Diagnosis Date Noted    [facility] [J44.1] 03/23/2020     -Current everyday smoker    -Severe COPD with acute exacerbation; patient does not follow-up with any pulmonary. Does not wear oxygen at home. Oxygen sats were 86% on room air in the emergency room. Continue prednisone 40 mg daily along with the breathing treatments.    -Acute hypoxic respiratory failure: Secondary to acute exacerbation of COPD. -Given extensive smoking history, weight loss, progressive fatigue will get CT scan of the chest without contrast to exclude any lung mass. Patient has not got screening colonoscopy done. This will need to be addressed as outpatient. Patient to be discharged later today or tomorrow should her CT scan of the chest will normal with close outpatient follow-up with her primary.     She will need nebulizer on discharge    DVT prophylaxis: Humberto Woodson MD  Pager : [phone]

## 2020-03-24 NOTE — H&P
Hospital Medicine History & Physical      PCP: Millicent Mijares MD    Date of Admission: 3/23/2020    Date of Service: 3/23/20      Chief Complaint:  Fatigue, shortness of breath      History Of Present Illness:  69 y.o. female who presented to [facility] ED for complaints of fatigue and progressively worsening shortness of breath over the past two weeks. The patient admits to smoking almost two packs of cigarettes daily. She admits to a dry nonproductive cough. The patient states that she becomes winded with any physical exertion. She admits to taking her nebulizer treatments. But states that she dropped it yesterday and believes that it is now broken. When she arrived in the ED her oxygenation saturation was found to be 86% on room air. Lungs sounds wheezing throughout. Denies cp prod cough ha rash anx n v d f     Past Medical History:          Diagnosis Date    Bowel disease     Cataract     Chronic back pain     [facility]     Dizziness and giddiness     GERD (gastroesophageal reflux disease)     Glaucoma     Hypertension     Osteoarthritis     Pancreatitis     Seizures (HCC)     TIA (transient ischemic attack)        Past Surgical History:          Procedure Laterality Date    APPENDECTOMY      CHOLECYSTECTOMY      INTESTINAL BYPASS      jejunoileal       Medications Prior to Admission:      Prior to Admission medications    Medication Sig Start Date End Date Taking?  Authorizing Provider   potassium chloride (KLOR-CON M) 20 MEQ extended release tablet TAKE 1 TABLET BY MOUTH TWICE DAILY 3/11/20   Carola Myers MD   Magnesium Oxide 400 MG CAPS Take 400 mg by mouth 3 times daily 3/11/20   Carola Myers MD   albuterol sulfate HFA (VENTOLIN HFA) 108 (90 Base) MCG/ACT inhaler Inhale 2 puffs into the lungs every 6 hours as needed for Wheezing 3/6/20   Carola Myers MD   clonazePAM (KLONOPIN) 0.5 MG tablet Take 1 tablet by mouth nightly as needed for Anxiety for up to Cancer Neg Hx     Diabetes Neg Hx     Cancer Neg Hx     Eclampsia Neg Hx     Ovarian Cancer Neg Hx     Hypertension Neg Hx      Labor Neg Hx     Spont Abortions Neg Hx     Stroke Neg Hx        REVIEW OF SYSTEMS:   Pertinent positives as noted in the HPI. All other systems reviewed and negative. PHYSICAL EXAM:    /71   Pulse 70   Temp 98.6 °F (37 °C) (Oral)   Resp 20   Ht 5' 2\" (1.575 m)   Wt 106 lb (48.1 kg)   SpO2 98%   BMI 19.39 kg/m²     General appearance:  No apparent distress, appears stated age and cooperative. HEENT:  Normal cephalic, atraumatic without obvious deformity. Pupils equal, round, and reactive to light. Extra ocular muscles intact. Conjunctivae/corneas clear. Neck: Supple, with full range of motion. No jugular venous distention. Trachea midline. Respiratory:  Normal respiratory effort. Clear to auscultation, bilaterally without Rales/Wheezes/Rhonchi. Cardiovascular:  Regular rate and rhythm with normal S1/S2 without murmurs, rubs or gallops. Abdomen: Soft, non-tender, non-distended with normal bowel sounds. Musculoskeletal:  No clubbing, cyanosis or edema bilaterally. Full range of motion without deformity. Skin: Skin color, texture, turgor normal.  No rashes or lesions. Neurologic:  Neurovascularly intact without any focal sensory/motor deficits. Cranial nerves: II-XII intact, grossly non-focal.  Psychiatric:  Alert and oriented, thought content appropriate, normal insight  Capillary Refill: Brisk,< 3 seconds   Peripheral Pulses: +2 palpable, equal bilaterally       Labs:     Recent Labs     20  1745   WBC 9.5   HGB 15.9   HCT 46.7        Recent Labs     20  1745      K 4.7      CO2 18*   BUN 47*   CREATININE 2.38*   CALCIUM 10.1*     Recent Labs     20  1745   AST 19   ALT 15   BILITOT 0.3   ALKPHOS 102     No results for input(s): INR in the last 72 hours.   No results for input(s): Ngoc Crespo in the last 72

## 2020-03-24 NOTE — PROGRESS NOTES
Measures:  · Ht: 5' 2\" (157.5 cm)   · Current Body Wt:  n/a  · Admission Body Wt: 106 lb (48.1 kg)(stated actual)  · Usual Body Wt: 116 lb (52.6 kg)(1-28)  · % Weight Change:  ,  8.6% in 2 months  · Ideal Body Wt: 110 lb (49.9 kg), % Ideal Body 96%  · BMI Classification: BMI 18.5 - 24.9 Normal Weight    Nutrition Interventions:   Continue current diet, Start ONS  Continued Inpatient Monitoring    Nutrition Evaluation:   · Evaluation: Goals set   · Goals: Intake >75% of meals/supplement. Weight gain.     · Monitoring: Meal Intake, Supplement Intake, Weight      Electronically signed by Gricel Gutierrez RD, LD on 3/24/20 at 12:52 PM EDT

## 2020-03-25 VITALS
TEMPERATURE: 97.3 F | HEIGHT: 62 IN | SYSTOLIC BLOOD PRESSURE: 141 MMHG | OXYGEN SATURATION: 95 % | HEART RATE: 72 BPM | BODY MASS INDEX: 19.51 KG/M2 | WEIGHT: 106 LBS | RESPIRATION RATE: 20 BRPM | DIASTOLIC BLOOD PRESSURE: 68 MMHG

## 2020-03-25 LAB
ANION GAP SERPL CALCULATED.3IONS-SCNC: 13 MEQ/L (ref 9–15)
BASOPHILS ABSOLUTE: 0 K/UL (ref 0–0.2)
BASOPHILS RELATIVE PERCENT: 0.3 %
BUN BLDV-MCNC: 32 MG/DL (ref 8–23)
CALCIUM SERPL-MCNC: 8.9 MG/DL (ref 8.5–9.9)
CHLORIDE BLD-SCNC: 112 MEQ/L (ref 95–107)
CO2: 15 MEQ/L (ref 20–31)
CREAT SERPL-MCNC: 1.63 MG/DL (ref 0.5–0.9)
EOSINOPHILS ABSOLUTE: 0 K/UL (ref 0–0.7)
EOSINOPHILS RELATIVE PERCENT: 0.5 %
GFR AFRICAN AMERICAN: 37.3
GFR NON-AFRICAN AMERICAN: 30.9
GLUCOSE BLD-MCNC: 76 MG/DL (ref 70–99)
HCT VFR BLD CALC: 35.9 % (ref 37–47)
HEMOGLOBIN: 11.8 G/DL (ref 12–16)
LYMPHOCYTES ABSOLUTE: 1.6 K/UL (ref 1–4.8)
LYMPHOCYTES RELATIVE PERCENT: 17 %
MCH RBC QN AUTO: 31.8 PG (ref 27–31.3)
MCHC RBC AUTO-ENTMCNC: 33 % (ref 33–37)
MCV RBC AUTO: 96.3 FL (ref 82–100)
MONOCYTES ABSOLUTE: 0.7 K/UL (ref 0.2–0.8)
MONOCYTES RELATIVE PERCENT: 7.4 %
NEUTROPHILS ABSOLUTE: 6.9 K/UL (ref 1.4–6.5)
NEUTROPHILS RELATIVE PERCENT: 74.8 %
PDW BLD-RTO: 14.4 % (ref 11.5–14.5)
PLATELET # BLD: 216 K/UL (ref 130–400)
POTASSIUM REFLEX MAGNESIUM: 4.4 MEQ/L (ref 3.4–4.9)
RBC # BLD: 3.72 M/UL (ref 4.2–5.4)
SODIUM BLD-SCNC: 140 MEQ/L (ref 135–144)
WBC # BLD: 9.2 K/UL (ref 4.8–10.8)

## 2020-03-25 PROCEDURE — 96372 THER/PROPH/DIAG INJ SC/IM: CPT

## 2020-03-25 PROCEDURE — 85025 COMPLETE CBC W/AUTO DIFF WBC: CPT

## 2020-03-25 PROCEDURE — 97116 GAIT TRAINING THERAPY: CPT

## 2020-03-25 PROCEDURE — G0378 HOSPITAL OBSERVATION PER HR: HCPCS

## 2020-03-25 PROCEDURE — 6360000002 HC RX W HCPCS: Performed by: PHYSICIAN ASSISTANT

## 2020-03-25 PROCEDURE — 6370000000 HC RX 637 (ALT 250 FOR IP): Performed by: INTERNAL MEDICINE

## 2020-03-25 PROCEDURE — 6360000002 HC RX W HCPCS: Performed by: INTERNAL MEDICINE

## 2020-03-25 PROCEDURE — 80048 BASIC METABOLIC PNL TOTAL CA: CPT

## 2020-03-25 PROCEDURE — 6370000000 HC RX 637 (ALT 250 FOR IP): Performed by: PHYSICIAN ASSISTANT

## 2020-03-25 PROCEDURE — 2580000003 HC RX 258: Performed by: INTERNAL MEDICINE

## 2020-03-25 PROCEDURE — 94640 AIRWAY INHALATION TREATMENT: CPT

## 2020-03-25 PROCEDURE — 94761 N-INVAS EAR/PLS OXIMETRY MLT: CPT

## 2020-03-25 PROCEDURE — 36415 COLL VENOUS BLD VENIPUNCTURE: CPT

## 2020-03-25 PROCEDURE — 99232 SBSQ HOSP IP/OBS MODERATE 35: CPT | Performed by: INTERNAL MEDICINE

## 2020-03-25 RX ORDER — PREDNISONE 20 MG/1
40 TABLET ORAL DAILY
Qty: 10 TABLET | Refills: 0 | Status: SHIPPED | OUTPATIENT
Start: 2020-03-26 | End: 2020-03-31

## 2020-03-25 RX ORDER — AMLODIPINE BESYLATE 10 MG/1
5 TABLET ORAL DAILY
Qty: 30 TABLET | Refills: 1 | Status: SHIPPED | OUTPATIENT
Start: 2020-03-25 | End: 2020-06-15 | Stop reason: DRUGHIGH

## 2020-03-25 RX ORDER — LOSARTAN POTASSIUM 50 MG/1
50 TABLET ORAL DAILY
Qty: 30 TABLET | Refills: 1 | Status: SHIPPED | OUTPATIENT
Start: 2020-03-25 | End: 2020-05-18 | Stop reason: SDUPTHER

## 2020-03-25 RX ADMIN — POTASSIUM CHLORIDE 20 MEQ: 1500 TABLET, EXTENDED RELEASE ORAL at 09:53

## 2020-03-25 RX ADMIN — ENOXAPARIN SODIUM 30 MG: 30 INJECTION SUBCUTANEOUS at 09:54

## 2020-03-25 RX ADMIN — PREDNISONE 40 MG: 20 TABLET ORAL at 09:53

## 2020-03-25 RX ADMIN — IPRATROPIUM BROMIDE AND ALBUTEROL SULFATE 1 AMPULE: 2.5; .5 SOLUTION RESPIRATORY (INHALATION) at 11:48

## 2020-03-25 RX ADMIN — SERTRALINE HYDROCHLORIDE 25 MG: 25 TABLET ORAL at 09:54

## 2020-03-25 RX ADMIN — BUDESONIDE 250 MCG: 0.25 SUSPENSION RESPIRATORY (INHALATION) at 07:36

## 2020-03-25 RX ADMIN — Medication 400 MG: at 09:53

## 2020-03-25 RX ADMIN — PANTOPRAZOLE SODIUM 40 MG: 40 TABLET, DELAYED RELEASE ORAL at 09:53

## 2020-03-25 RX ADMIN — IPRATROPIUM BROMIDE AND ALBUTEROL SULFATE 1 AMPULE: 2.5; .5 SOLUTION RESPIRATORY (INHALATION) at 07:35

## 2020-03-25 RX ADMIN — PROPRANOLOL HYDROCHLORIDE 40 MG: 40 TABLET ORAL at 09:53

## 2020-03-25 RX ADMIN — PANCRELIPASE 24000 UNITS: 60000; 12000; 38000 CAPSULE, DELAYED RELEASE PELLETS ORAL at 09:52

## 2020-03-25 RX ADMIN — CLOPIDOGREL BISULFATE 75 MG: 75 TABLET ORAL at 09:53

## 2020-03-25 RX ADMIN — SODIUM CHLORIDE: 9 INJECTION, SOLUTION INTRAVENOUS at 03:31

## 2020-03-25 ASSESSMENT — PAIN SCALES - GENERAL: PAINLEVEL_OUTOF10: 0

## 2020-03-25 NOTE — PROGRESS NOTES
Take 1 tablet by mouth daily 90 tablet 3    lipase-protease-amylase (CREON) 24531-36392 units delayed release capsule Take 1 capsule by mouth 2 times daily 180 capsule 3    pantoprazole (PROTONIX) 40 MG tablet Take 1 tablet by mouth daily 90 tablet 3    propranolol (INDERAL) 40 MG tablet Take 1 tablet by mouth 2 times daily 180 tablet 3    sertraline (ZOLOFT) 25 MG tablet Take 1 tablet by mouth daily 90 tablet 3    donepezil (ARICEPT) 5 MG tablet Take 1 tablet by mouth nightly 30 tablet 5    Oxyquinoline-Sod Lauryl Sulf (TRIMO-SONG) 0.025-0.01 % GEL Place 113.4 g vaginally Twice a Week 113.4 g 3    clotrimazole-betamethasone (LOTRISONE) 1-0.05 % cream Apply topically 2 times daily. 45 g 5    mupirocin (BACTROBAN) 2 % ointment MICHAELLE TOPICALLY AA TID FOR 10 DAYS 15 g 0         OBJECTIVE:  Vital Signs:  Vitals:    03/25/20 0742   BP:    Pulse:    Resp:    Temp:    SpO2: 95%       Focal exam:      General Exam (except as mentioned above):  CONSTITUTIONAL: Awake, alert, no apparent distress  EYES:  PERRL, conjunctiva normal  ENT:  Normocephalic, atraumatic  NECK:  Supple  BACK:  Symmetric  LUNGS:  CTAB except bilateral basilar crackles. CARDIOVASCULAR:  S1S2 present  ABDOMEN:  soft, non-distended, non-tender  MUSCULOSKELETAL:  There is no redness, warmth, or swelling of the joints. NEUROLOGIC:  Alert, awake, oriented x 3. No FND  EXTREMITIES: Warm and well perfused.      LABS  Recent Labs     03/23/20 1745 03/24/20 0526 03/25/20  0551   WBC 9.5 5.8 9.2   RBC 4.92 4.08* 3.72*   HGB 15.9 13.0 11.8*   HCT 46.7 40.1 35.9*   MCV 95.0 98.3 96.3   MCH 32.4* 31.8* 31.8*   MCHC 34.1 32.3* 33.0   RDW 14.1 14.7* 14.4    211 216       Recent Labs     03/23/20 1745 03/24/20  0526 03/25/20  0551    139 140   K 4.7 5.0* 4.4    110* 112*   CO2 18* 13* 15*   BUN 47* 42* 32*   CREATININE 2.38* 2.10* 1.63*   GLUCOSE 129* 123* 76   CALCIUM 10.1* 8.9 8.9       Recent Labs     03/23/20  1745   MG 2.1

## 2020-03-25 NOTE — CARE COORDINATION
Patient denies other home going needs. Only need is nebulizer machine. Patient denies preference to agency. Faxed order to [facility].

## 2020-03-26 ENCOUNTER — CARE COORDINATION (OUTPATIENT)
Dept: CASE MANAGEMENT | Age: 75
End: 2020-03-26

## 2020-03-26 ENCOUNTER — TELEPHONE (OUTPATIENT)
Dept: PULMONOLOGY | Age: 75
End: 2020-03-26

## 2020-03-26 PROCEDURE — 1111F DSCHRG MED/CURRENT MED MERGE: CPT | Performed by: FAMILY MEDICINE

## 2020-03-26 PROCEDURE — 93010 ELECTROCARDIOGRAM REPORT: CPT | Performed by: INTERNAL MEDICINE

## 2020-03-26 NOTE — CARE COORDINATION
[facility]. CTN/ACM provided contact information for future reference. Reviewed and educated patient on any new and changed medications related to discharge diagnosis     Plan for follow-up call in 14 days based on severity of symptoms and risk factors. Denies any fever, chills, or flu-like symptoms.

## 2020-03-28 LAB
BLOOD CULTURE, ROUTINE: NORMAL
CULTURE, BLOOD 2: NORMAL

## 2020-03-30 DIAGNOSIS — I10 ESSENTIAL HYPERTENSION: ICD-10-CM

## 2020-03-30 LAB
ANION GAP SERPL CALCULATED.3IONS-SCNC: 14 MEQ/L (ref 9–15)
BUN BLDV-MCNC: 25 MG/DL (ref 8–23)
CALCIUM SERPL-MCNC: 9.8 MG/DL (ref 8.5–9.9)
CHLORIDE BLD-SCNC: 105 MEQ/L (ref 95–107)
CO2: 18 MEQ/L (ref 20–31)
CREAT SERPL-MCNC: 1.48 MG/DL (ref 0.5–0.9)
GFR AFRICAN AMERICAN: 41.7
GFR NON-AFRICAN AMERICAN: 34.5
GLUCOSE BLD-MCNC: 109 MG/DL (ref 70–99)
POTASSIUM SERPL-SCNC: 4.9 MEQ/L (ref 3.4–4.9)
SODIUM BLD-SCNC: 137 MEQ/L (ref 135–144)

## 2020-03-30 NOTE — PROGRESS NOTES
Physical Therapy  Facility/Department: [facility] MED SURG [id number]  Physical Therapy Discharge      NAME: Taya Pak    : 1945 (83 y.o.)  MRN: [id number]    Account: [de-identified]  Gender: female      Patient has been discharged from acute care hospital. DC patient from current PT program.      Electronically signed by Adarsh Chowdary PT on 3/30/20 at 9:57 AM EDT

## 2020-04-01 ENCOUNTER — VIRTUAL VISIT (OUTPATIENT)
Dept: FAMILY MEDICINE CLINIC | Age: 75
End: 2020-04-01
Payer: MEDICARE

## 2020-04-01 PROCEDURE — G2012 BRIEF CHECK IN BY MD/QHP: HCPCS | Performed by: FAMILY MEDICINE

## 2020-04-01 NOTE — PROGRESS NOTES
50 MG tablet Take 1 tablet by mouth daily  Baldemar Boles MD   amLODIPine (NORVASC) 10 MG tablet Take 0.5 tablets by mouth daily  Nam Zuniga MD   potassium chloride (KLOR-CON M) 20 MEQ extended release tablet TAKE 1 TABLET BY MOUTH TWICE DAILY  Reed Naylor MD   Magnesium Oxide 400 MG CAPS Take 400 mg by mouth 3 times daily  Reed Naylor MD   albuterol sulfate HFA (VENTOLIN HFA) 108 (90 Base) MCG/ACT inhaler Inhale 2 puffs into the lungs every 6 hours as needed for Wheezing  Patient not taking: Reported on 3/26/2020  Reed Naylor MD   clonazePAM (KLONOPIN) 0.5 MG tablet Take 1 tablet by mouth nightly as needed for Anxiety for up to 90 days. Reed Naylor MD   albuterol (PROVENTIL) (2.5 MG/3ML) 0.083% nebulizer solution Take 3 mLs by nebulization every 4 hours as needed for Wheezing  Reed Naylor MD   donepezil (ARICEPT) 5 MG tablet Take 1 tablet by mouth nightly  Reed Naylor MD   metroNIDAZOLE (FLAGYL) 500 MG tablet TAKE 1 TABLET THREE TIMES A DAY FOR 10 DAYS PER MONTH  Reed Naylor MD   Oxyquinoline-Sod Lauryl Sulf (TRIMO-SONG) 0.025-0.01 % GEL Place 113.4 g vaginally Twice a Week  Shakira Tripp MD   clopidogrel (PLAVIX) 75 MG tablet Take 1 tablet by mouth daily  Reed Naylor MD   clotrimazole-betamethasone (LOTRISONE) 1-0.05 % cream Apply topically 2 times daily.   Reed Naylor MD   lipase-protease-amylase (CREON) 44838-76981 units delayed release capsule Take 1 capsule by mouth 2 times daily  Reed Naylor MD   pantoprazole (PROTONIX) 40 MG tablet Take 1 tablet by mouth daily  Reed Naylor MD   mupirocin (BACTROBAN) 2 % ointment MICHAELLE TOPICALLY AA TID FOR 10 DAYS  Reed Naylor MD   propranolol (INDERAL) 40 MG tablet Take 1 tablet by mouth 2 times daily  Reed Naylor MD   sertraline (ZOLOFT) 25 MG tablet Take 1 tablet by mouth daily  Reed Naylor MD       Social History     Tobacco Use    Smoking status: Current Every Day Smoker     Packs/day: 3.00     Years: 54.00     Pack years: 162.00     Types: Cigarettes    Smokeless tobacco: Never Used   Substance Use Topics    Alcohol use: No     Alcohol/week: 0.0 standard drinks    Drug use: No            PHYSICAL EXAMINATION:  There were no vitals taken for this visit. No exam  Phone visit  Patient in no significant distress, conversant  She was breathing a little heavy on the phone  States she is feeling better    Due to this being a TeleHealth encounter, evaluation of the following organ systems is limited: Vitals/Constitutional/EENT/Resp/CV/GI//MS/Neuro/Skin/Heme-Lymph-Imm. Lab Results   Component Value Date    WBC 9.2 03/25/2020    HGB 11.8 (L) 03/25/2020    HCT 35.9 (L) 03/25/2020     03/25/2020    CHOL 118 12/08/2017    TRIG 235 (H) 12/08/2017    HDL 26 (L) 12/08/2017    ALT 15 03/23/2020    AST 19 03/23/2020     03/30/2020    K 4.9 03/30/2020     03/30/2020    CREATININE 1.48 (H) 03/30/2020    BUN 25 (H) 03/30/2020    CO2 18 (L) 03/30/2020    TSH 1.710 11/15/2019    INR 0.9 11/15/2019         ASSESSMENT/PLAN:     Diagnosis Orders   1. Chronic obstructive pulmonary disease, unspecified COPD type ([address]     2. Hypomagnesemia     3. Hypokalemia     4. Dementia with behavioral disturbance, unspecified dementia type ([address]     5. COPD exacerbation ([address]     6. Anxiety     7. Gastroesophageal reflux disease, esophagitis presence not specified       She is now on amlodipine and losartan    Will need office f/u to re-check bp    Encouraged to quit smoking    Return in about 6 weeks (around 5/13/2020). An  electronic signature was used to authenticate this note.     --Domi Funk MD on 4/1/2020 at 2:07 PM        Pursuant to the emergency declaration under the [facility] waiver authority and the [email or web address] and [employer] Act, this Virtual  Visit was conducted, with patient's consent, to reduce the patient's risk of exposure to COVID-19 and provide continuity of care for an

## 2020-04-02 RX ORDER — ALBUTEROL SULFATE 90 UG/1
2 AEROSOL, METERED RESPIRATORY (INHALATION) EVERY 6 HOURS PRN
Qty: 3 INHALER | Refills: 3 | Status: CANCELLED | OUTPATIENT
Start: 2020-04-02

## 2020-04-02 RX ORDER — ALBUTEROL SULFATE 90 UG/1
2 AEROSOL, METERED RESPIRATORY (INHALATION) EVERY 6 HOURS PRN
Qty: 3 INHALER | Refills: 3 | Status: SHIPPED | OUTPATIENT
Start: 2020-04-02 | End: 2021-02-20 | Stop reason: SDUPTHER

## 2020-04-02 RX ORDER — ALBUTEROL SULFATE 2.5 MG/3ML
2.5 SOLUTION RESPIRATORY (INHALATION) EVERY 4 HOURS PRN
Qty: 120 EACH | Refills: 3 | Status: SHIPPED | OUTPATIENT
Start: 2020-04-02 | End: 2020-06-25 | Stop reason: SDUPTHER

## 2020-04-02 RX ORDER — ALBUTEROL SULFATE 90 UG/1
2 AEROSOL, METERED RESPIRATORY (INHALATION) EVERY 6 HOURS PRN
Qty: 3 INHALER | Refills: 3 | OUTPATIENT
Start: 2020-04-02

## 2020-04-06 ENCOUNTER — VIRTUAL VISIT (OUTPATIENT)
Dept: PULMONOLOGY | Age: 75
End: 2020-04-06
Payer: MEDICARE

## 2020-04-06 PROCEDURE — 99214 OFFICE O/P EST MOD 30 MIN: CPT | Performed by: INTERNAL MEDICINE

## 2020-04-06 ASSESSMENT — ENCOUNTER SYMPTOMS
TROUBLE SWALLOWING: 0
WHEEZING: 1
SORE THROAT: 0
VOMITING: 0
EYE ITCHING: 0
NAUSEA: 0
COUGH: 1
CHEST TIGHTNESS: 0
EYE DISCHARGE: 0
DIARRHEA: 0
SHORTNESS OF BREATH: 1
SINUS PRESSURE: 0
ABDOMINAL PAIN: 0
RHINORRHEA: 0
VOICE CHANGE: 0

## 2020-04-14 ENCOUNTER — CARE COORDINATION (OUTPATIENT)
Dept: CASE MANAGEMENT | Age: 75
End: 2020-04-14

## 2020-04-14 NOTE — CARE COORDINATION
COVID-19 Screening Follow-up Note    Patient contacted regarding COVID-19 risk and screening. Care Transition Nurse/ Ambulatory Care Manager contacted the family by telephone to perform follow-up assessment. Verified name and  with family as identifiers. Patient has following risk factors of: Smoker, COPD, Pancreatitis. Symptoms reviewed with family who verbalized the following symptoms: Baseline SOB responsive to neb tx. Due to no new or worsening symptoms encounter was not routed to provider for escalation. Education provided regarding infection prevention, and signs and symptoms of COVID-19 and when to seek medical attention with family who verbalized understanding. Discussed exposure protocols and quarantine from [address] you at higher risk for severe illness  and given an opportunity for questions and concerns. The family agrees to contact the COVID-19 hotline [phone] or PCP office for questions related to their healthcare. CTN/ACM provided contact information for future reference. From CDC: Are you at higher risk for severe illness?  Wash your hands often.  Avoid close contact (6 feet, which is about two arm lengths) with people who are sick.  Put distance between yourself and other people if COVID-19 is spreading in your community.  Clean and disinfect frequently touched surfaces.  Avoid all cruise travel and non-essential air travel.  Call your healthcare professional if you have concerns about COVID-19 and your underlying condition or if you are sick. For more information on steps you can take to protect yourself, see CDC's How to Protect Yourself      Denies fever, chills, cough, or flu-like symptoms. Denies any home or DME needs. CTN s/o. Advance Care Planning  People with COVID-19 may have no symptoms, mild symptoms, such as fever, cough, and shortness of breath or they may have more severe illness, developing severe and fatal pneumonia.   As a result, Advance Care Planning with attention to naming a health care decision maker (someone you trust to make healthcare decisions for you if you could not speak for yourself) and sharing other health care preferences is important BEFORE a possible health crisis. Please contact your Primary Care Provider to discuss Advance Care Planning. Preventing the Spread of Coronavirus Disease 2019 in Homes and Residential Communities  For the most recent information go to [email or web address]    Prevention steps for People with confirmed or suspected COVID-19 (including persons under investigation) who do not need to be hospitalized  and   People with confirmed COVID-19 who were hospitalized and determined to be medically stable to go home    Your healthcare provider and public health staff will evaluate whether you can be cared for at home. If it is determined that you do not need to be hospitalized and can be isolated at home, you will be monitored by staff from your local or state health department. You should follow the prevention steps below until a healthcare provider or local or state health department says you can return to your normal activities. Stay home except to get medical care  People who are mildly ill with COVID-19 are able to isolate at home during their illness. You should restrict activities outside your home, except for getting medical care. Do not go to work, school, or public areas. Avoid using public transportation, ride-sharing, or taxis. Separate yourself from other people and animals in your home  People: As much as possible, you should stay in a specific room and away from other people in your home. Also, you should use a separate bathroom, if available. Animals: You should restrict contact with pets and other animals while you are sick with COVID-19, just like you would around other people.  Although there have not been reports of pets or other animals becoming sick with COVID-19, it is still recommended that people sick with COVID-19 limit contact with animals until more information is known about the virus. When possible, have another member of your household care for your animals while you are sick. If you are sick with COVID-19, avoid contact with your pet, including petting, snuggling, being kissed or licked, and sharing food. If you must care for your pet or be around animals while you are sick, wash your hands before and after you interact with pets and wear a facemask. Call ahead before visiting your doctor  If you have a medical appointment, call the healthcare provider and tell them that you have or may have COVID-19. This will help the healthcare providers office take steps to keep other people from getting infected or exposed. Wear a facemask  You should wear a facemask when you are around other people (e.g., sharing a room or vehicle) or pets and before you enter a healthcare providers office. If you are not able to wear a facemask (for example, because it causes trouble breathing), then people who live with you should not stay in the same room with you, or they should wear a facemask if they enter your room. Cover your coughs and sneezes  Cover your mouth and nose with a tissue when you cough or sneeze. Throw used tissues in a lined trash can. Immediately wash your hands with soap and water for at least 20 seconds or, if soap and water are not available, clean your hands with an alcohol-based hand  that contains at least 60% alcohol. Clean your hands often  Wash your hands often with soap and water for at least 20 seconds, especially after blowing your nose, coughing, or sneezing; going to the bathroom; and before eating or preparing food.  If soap and water are not readily available, use an alcohol-based hand  with at least 60% alcohol, covering all surfaces of your hands and rubbing them together until they arrive. Discontinuing home isolation  Patients with confirmed COVID-19 should remain under home isolation precautions until the risk of secondary transmission to others is thought to be low. The decision to discontinue home isolation precautions should be made on a case-by-case basis, in consultation with healthcare providers and state and local health departments.

## 2020-05-07 ENCOUNTER — OFFICE VISIT (OUTPATIENT)
Dept: FAMILY MEDICINE CLINIC | Age: 75
End: 2020-05-07
Payer: MEDICARE

## 2020-05-07 VITALS
HEIGHT: 62 IN | OXYGEN SATURATION: 90 % | WEIGHT: 86.6 LBS | RESPIRATION RATE: 20 BRPM | SYSTOLIC BLOOD PRESSURE: 112 MMHG | TEMPERATURE: 97.1 F | BODY MASS INDEX: 15.94 KG/M2 | HEART RATE: 60 BPM | DIASTOLIC BLOOD PRESSURE: 66 MMHG

## 2020-05-07 PROCEDURE — 99214 OFFICE O/P EST MOD 30 MIN: CPT | Performed by: FAMILY MEDICINE

## 2020-05-07 RX ORDER — MIRTAZAPINE 15 MG/1
15 TABLET, FILM COATED ORAL NIGHTLY
Qty: 30 TABLET | Refills: 3 | Status: SHIPPED | OUTPATIENT
Start: 2020-05-07 | End: 2020-08-21

## 2020-05-07 RX ORDER — M-VIT,TX,IRON,MINS/CALC/FOLIC 27MG-0.4MG
1 TABLET ORAL DAILY
Qty: 90 TABLET | Refills: 3 | Status: SHIPPED | OUTPATIENT
Start: 2020-05-07 | End: 2020-05-08 | Stop reason: SDUPTHER

## 2020-05-07 RX ORDER — LEVOFLOXACIN 500 MG/1
500 TABLET, FILM COATED ORAL DAILY
Qty: 10 TABLET | Refills: 0 | Status: SHIPPED | OUTPATIENT
Start: 2020-05-07 | End: 2020-05-17

## 2020-05-07 RX ORDER — PREDNISONE 20 MG/1
TABLET ORAL
Qty: 20 TABLET | Refills: 0 | Status: SHIPPED | OUTPATIENT
Start: 2020-05-07 | End: 2020-05-17

## 2020-05-07 NOTE — PROGRESS NOTES
Chief Complaint   Patient presents with    Follow-Up from Hospital     pneumonia, no appite, not eating, drinking ensure, food don't taste right     Discuss Medications     wants to know wny she is taking zoloft if she is on klonopin       HPI:  Radha Lebron is a 69 y.o. female     Reports feeling tired/slow    Son with her     bp is much better    Losing weight    No appetite    Wt Readings from Last 3 Encounters:   05/07/20 86 lb 9.6 oz (39.3 kg)   03/23/20 106 lb (48.1 kg)   03/09/20 106 lb (48.1 kg)         Wants b12 again    Compliant with meds for HTN    GERD, on protonix  COPD, uses albuterol    plavix for reported hx of TIA    She had ileojejunal bypass in 1978  She drinks ensure        Wt Readings from Last 3 Encounters:   05/07/20 86 lb 9.6 oz (39.3 kg)   03/23/20 106 lb (48.1 kg)   03/09/20 106 lb (48.1 kg)         Patient Active Problem List   Diagnosis    HTN (hypertension)    Branch retinal vein occlusion    Cortical senile cataract    Nonexudative age-related macular degeneration    Nuclear senile cataract    GERD (gastroesophageal reflux disease)    Chronic obstructive pulmonary disease ([address]    H/O chronic pancreatitis    B12 deficiency    Anxiety    COPD (chronic obstructive pulmonary disease) ([facility] [address]    COPD exacerbation (HCC)       Current Outpatient Medications   Medication Sig Dispense Refill    mirtazapine (REMERON) 15 MG tablet Take 1 tablet by mouth nightly 30 tablet 3    predniSONE (DELTASONE) 20 MG tablet 3 tabs orally daily times 3 days, 2 tabs orally daily times 3 days, 1 tab orally times 3 days, then 1/2 tab daily until gone 20 tablet 0    levoFLOXacin (LEVAQUIN) 500 MG tablet Take 1 tablet by mouth daily for 10 days 10 tablet 0    Multiple Vitamins-Minerals (THERAPEUTIC MULTIVITAMIN-MINERALS) tablet Take 1 tablet by mouth daily 90 tablet 3    albuterol (PROVENTIL) (2.5 MG/3ML) 0.083% nebulizer solution Take 3 mLs by nebulization every 4 hours as needed for Wheezing 120 CHOLECYSTECTOMY      INTESTINAL BYPASS      jejunoileal     Family History   Problem Relation Age of Onset    Arthritis Other     Heart Disease Other     High Blood Pressure Other     Kidney Disease Other     Other Other         respiratory problems    Breast Cancer Neg Hx     Colon Cancer Neg Hx     Diabetes Neg Hx     Cancer Neg Hx     Eclampsia Neg Hx     Ovarian Cancer Neg Hx     Hypertension Neg Hx      Labor Neg Hx     Spont Abortions Neg Hx     Stroke Neg Hx      Social History     Socioeconomic History    Marital status:      Spouse name: None    Number of children: None    Years of education: None    Highest education level: None   Occupational History    None   Social Needs    Financial resource strain: None    Food insecurity     Worry: None     Inability: None    Transportation needs     Medical: None     Non-medical: None   Tobacco Use    Smoking status: Current Every Day Smoker     Packs/day: 3.00     Years: 54.00     Pack years: 162.00     Types: Cigarettes    Smokeless tobacco: Never Used   Substance and Sexual Activity    Alcohol use: No     Alcohol/week: 0.0 standard drinks    Drug use: No    Sexual activity: Not Currently     Partners: Male     Comment:    Lifestyle    Physical activity     Days per week: None     Minutes per session: None    Stress: None   Relationships    Social connections     Talks on phone: None     Gets together: None     Attends Orthodox service: None     Active member of club or organization: None     Attends meetings of clubs or organizations: None     Relationship status: None    Intimate partner violence     Fear of current or ex partner: None     Emotionally abused: None     Physically abused: None     Forced sexual activity: None   Other Topics Concern    None   Social History Narrative    None     Allergies   Allergen Reactions    Aspirin      Bothers stomach    Penicillins      Unable to take   [employer]

## 2020-05-08 ENCOUNTER — HOSPITAL ENCOUNTER (OUTPATIENT)
Dept: GENERAL RADIOLOGY | Age: 75
Discharge: HOME | End: 2020-05-10
Payer: MEDICARE

## 2020-05-08 DIAGNOSIS — R63.4 WEIGHT LOSS: ICD-10-CM

## 2020-05-08 LAB
ANION GAP SERPL CALCULATED.3IONS-SCNC: 18 MEQ/L (ref 9–15)
BUN BLDV-MCNC: 24 MG/DL (ref 8–23)
CALCIUM SERPL-MCNC: 9.8 MG/DL (ref 8.5–9.9)
CHLORIDE BLD-SCNC: 105 MEQ/L (ref 95–107)
CO2: 13 MEQ/L (ref 20–31)
CREAT SERPL-MCNC: 1.57 MG/DL (ref 0.5–0.9)
GFR AFRICAN AMERICAN: 39
GFR NON-AFRICAN AMERICAN: 32.2
GLUCOSE BLD-MCNC: 92 MG/DL (ref 70–99)
HCT VFR BLD CALC: 47.2 % (ref 37–47)
HEMOGLOBIN: 15.3 G/DL (ref 12–16)
MCH RBC QN AUTO: 32.4 PG (ref 27–31.3)
MCHC RBC AUTO-ENTMCNC: 32.4 % (ref 33–37)
MCV RBC AUTO: 99.9 FL (ref 82–100)
PDW BLD-RTO: 16.5 % (ref 11.5–14.5)
PLATELET # BLD: 290 K/UL (ref 130–400)
POTASSIUM SERPL-SCNC: 5.7 MEQ/L (ref 3.4–4.9)
PREALBUMIN: 22.1 MG/DL (ref 20–40)
RBC # BLD: 4.73 M/UL (ref 4.2–5.4)
SODIUM BLD-SCNC: 136 MEQ/L (ref 135–144)
TSH SERPL DL<=0.05 MIU/L-ACNC: 3.84 UIU/ML (ref 0.44–3.86)
WBC # BLD: 6.7 K/UL (ref 4.8–10.8)

## 2020-05-08 PROCEDURE — 71046 X-RAY EXAM CHEST 2 VIEWS: CPT

## 2020-05-08 RX ORDER — M-VIT,TX,IRON,MINS/CALC/FOLIC 27MG-0.4MG
1 TABLET ORAL DAILY
Qty: 90 TABLET | Refills: 3 | Status: ON HOLD | OUTPATIENT
Start: 2020-05-08 | End: 2022-09-26

## 2020-05-15 ENCOUNTER — TELEPHONE (OUTPATIENT)
Dept: FAMILY MEDICINE CLINIC | Age: 75
End: 2020-05-15

## 2020-05-15 NOTE — TELEPHONE ENCOUNTER
Pt's daughter calling pt's feet are really swollen want to know what they should do?       Magnolia Mcmillan [phone]

## 2020-05-18 RX ORDER — LOSARTAN POTASSIUM 50 MG/1
50 TABLET ORAL DAILY
Qty: 30 TABLET | Refills: 1 | Status: SHIPPED | OUTPATIENT
Start: 2020-05-18 | End: 2020-05-20

## 2020-05-20 RX ORDER — LOSARTAN POTASSIUM 50 MG/1
50 TABLET ORAL DAILY
Qty: 90 TABLET | Refills: 1 | Status: SHIPPED | OUTPATIENT
Start: 2020-05-20 | End: 2020-11-19 | Stop reason: SDUPTHER

## 2020-05-21 ENCOUNTER — OFFICE VISIT (OUTPATIENT)
Dept: FAMILY MEDICINE CLINIC | Age: 75
End: 2020-05-21
Payer: MEDICARE

## 2020-05-21 VITALS
SYSTOLIC BLOOD PRESSURE: 108 MMHG | DIASTOLIC BLOOD PRESSURE: 70 MMHG | RESPIRATION RATE: 25 BRPM | WEIGHT: 94 LBS | HEART RATE: 70 BPM | HEIGHT: 62 IN | BODY MASS INDEX: 17.3 KG/M2 | TEMPERATURE: 97 F

## 2020-05-21 PROCEDURE — 99213 OFFICE O/P EST LOW 20 MIN: CPT | Performed by: FAMILY MEDICINE

## 2020-05-21 NOTE — PROGRESS NOTES
bypass  Genito-Urinary ROS: no dysuria, trouble voiding  Musculoskeletal ROS: some pain in her lower legs  Neurological ROS: negative for - behavioral changes, memory loss, numbness/tingling, tremors or weakness    In general patient otherwise reports feeling well. Physical Exam:  /70 (Site: Right Upper Arm)   Pulse 70   Temp 97 °F (36.1 °C)   Resp 25   Ht 5' 2\" (1.575 m)   Wt 94 lb (42.6 kg)   Breastfeeding No   BMI 17.19 kg/m²     Gen: Well, NAD, Alert, Oriented x 3, her history giving is a little circumstantial  HEENT: EOMI, eyes clear, MMM  Skin: no rash  Neck: no significant lymphadenopathy or thyromegaly  Lungs: exp wheeze   Heart: RRR, S1S2, w/out M/R/G, non-displaced PMI   Ext: 2+ pedal edema  Neuro: Neurovascularly intact w/ Sensory/Motor intact UE/LE Bilaterally. Psych: she appears euthymic    Lab Results   Component Value Date    WBC 6.7 05/08/2020    HGB 15.3 05/08/2020    HCT 47.2 (H) 05/08/2020     05/08/2020    CHOL 118 12/08/2017    TRIG 235 (H) 12/08/2017    HDL 26 (L) 12/08/2017    ALT 15 03/23/2020    AST 19 03/23/2020     05/08/2020    K 5.7 (H) 05/08/2020     05/08/2020    CREATININE 1.57 (H) 05/08/2020    BUN 24 (H) 05/08/2020    CO2 13 (L) 05/08/2020    TSH 3.840 05/08/2020    INR 0.9 11/15/2019         A&P   Diagnosis Orders   1. Depression, unspecified depression type     2. Hypokalemia  Basic Metabolic Panel   3. Dementia with behavioral disturbance, unspecified dementia type ([address]     4. COPD exacerbation ([address]     5. Hypomagnesemia     6. Essential hypertension     7. Weakness of both lower extremities  Internal Referral to Home Health   8.  Chronic obstructive pulmonary disease, unspecified COPD type ([address]  Tiotropium Bromide-Olodaterol 2.5-2.5 MCG/ACT AERS       bp much better    remeron to continue    stioloto Rx    aricept  Klonopin  Continue these    Home health referral given her weakness and potential for falls    Needs to strengthen      Wt Readings from Last 3 Encounters:   05/21/20 94 lb (42.6 kg)   05/07/20 86 lb 9.6 oz (39.3 kg)   03/23/20 106 lb (48.1 kg)         Navdeep Meneses MD

## 2020-05-27 ENCOUNTER — TELEPHONE (OUTPATIENT)
Dept: FAMILY MEDICINE CLINIC | Age: 75
End: 2020-05-27

## 2020-05-27 NOTE — TELEPHONE ENCOUNTER
Unfortunately I cannot safely try to use water pills in her case because she has EXTREME electrolyte imbalance history of both magnesium and potassium. If she were to be diuresed, it would have to be in the hospital with frequent lab monitoring and probably a nephrology consult.        If the swelling is getting that bad, it may be time to head to the hospital

## 2020-05-28 ENCOUNTER — TELEPHONE (OUTPATIENT)
Dept: FAMILY MEDICINE CLINIC | Age: 75
End: 2020-05-28

## 2020-06-01 ENCOUNTER — VIRTUAL VISIT (OUTPATIENT)
Dept: FAMILY MEDICINE CLINIC | Age: 75
End: 2020-06-01
Payer: MEDICARE

## 2020-06-01 PROCEDURE — G0438 PPPS, INITIAL VISIT: HCPCS | Performed by: NURSE PRACTITIONER

## 2020-06-01 ASSESSMENT — PATIENT HEALTH QUESTIONNAIRE - PHQ9
SUM OF ALL RESPONSES TO PHQ QUESTIONS 1-9: 0
SUM OF ALL RESPONSES TO PHQ QUESTIONS 1-9: 0

## 2020-06-01 ASSESSMENT — LIFESTYLE VARIABLES: HOW OFTEN DO YOU HAVE A DRINK CONTAINING ALCOHOL: 0

## 2020-06-01 NOTE — PROGRESS NOTES
05/21/20 94 lb (42.6 kg)   05/07/20 86 lb 9.6 oz (39.3 kg)   03/23/20 106 lb (48.1 kg)     Vitals: There is no height or weight on file to calculate BMI. Based upon direct observation of the patient, evaluation of cognition reveals recent memory intact, remote memory impaired. Patient's complete Health Risk Assessment and screening values have been reviewed and are found in Flowsheets. The following problems were reviewed today and where indicated follow up appointments were made and/or referrals ordered. Positive Risk Factor Screenings with Interventions:     Cognitive: Words recalled: 2 Words Recalled  Clock Drawing Test (CDT) Score: (N/a. VV )  Total Score Interpretation: Positive Mini-Cog  Did the patient refuse to take the cognition test?: No  Cognitive Impairment Interventions:  · Patient declines any further evaluation/treatment for cognitive impairment    Substance Abuse:  Social History     Tobacco History     Smoking Status  Current Every Day Smoker Smoking Frequency  3 packs/day for 47 years (162 pk yrs) Smoking Tobacco Type  Cigarettes    Smokeless Tobacco Use  Never Used          Alcohol History     Alcohol Use Status  No          Drug Use     Drug Use Status  No          Sexual Activity     Sexually Active  Not Currently Partners  Male Comment                 Audit Questionnaire: Screen for Alcohol Misuse  How often do you have a drink containing alcohol?: Never  Substance Abuse Interventions:  · Tobacco abuse:  patient is not ready to work toward tobacco cessation at this time    Health Habits/Nutrition:  Health Habits/Nutrition  Do you exercise for at least 20 minutes 2-3 times per week?: (!) No  Have you lost any weight without trying in the past 3 months?: (!) Yes  Do you eat fewer than 2 meals per day?: No  Have you seen a dentist within the past year?: Yes  There is no height or weight on file to calculate BMI.   Health Habits/Nutrition Interventions:  · Inadequate physical activity:  patient is not ready to increase his/her physical activity level at this time    ADL:  ADLs  In the past 7 days, did you need help from others to perform any of the following everyday activities? Eating, dressing, grooming, bathing, toileting, or walking/balance?: None  In the past 7 days, did you need help from others to take care of any of the following? Laundry, housekeeping, banking/finances, shopping, telephone use, food preparation, transportation, or taking medications?: (!) Taking Medications  ADL Interventions:  · Patient declines any further evaluation/treatment for this issue    Personalized Preventive Plan   Current Health Maintenance Status  Immunization History   Administered Date(s) Administered    Influenza, High Dose (Fluzone 65 yrs and older) 11/19/2015, 10/19/2016, 12/07/2017, 10/17/2018    Influenza, Triv, inactivated, subunit, adjuvanted, IM (Fluad 65 yrs and older) 10/18/2019    Pneumococcal Conjugate 13-valent ([facility]ring) 10/18/2019        Health Maintenance   Topic Date Due    DTaP/Tdap/Td vaccine (1 - Tdap) 05[birth year]10/1965    Shingles Vaccine (1 of 2) 05/10/1996    Colon cancer screen colonoscopy  05/10/1996    Breast cancer screen  05/19/2017    Annual Wellness Visit (AWV)  11/22/2019    Pneumococcal 65+ years Vaccine (2 of 2 - PPSV23) 10/18/2020    Low dose CT lung screening  03/24/2021    Potassium monitoring  05/08/2021    Creatinine monitoring  05/08/2021    Lipid screen  12/08/2022    Flu vaccine  Completed    Hepatitis C screen  Completed    DEXA (modify frequency per FRAX score)  Addressed    Hepatitis A vaccine  Aged Out    Hepatitis B vaccine  Aged Out    Hib vaccine  Aged Out    Meningococcal (ACWY) vaccine  Aged Out     Recommendations for Sand Sign Due: see orders and patient instructions/AVS.  .   Recommended screening schedule for the next 5-10 years is provided to the patient in written form: see Patient Carlos Boyle was seen today for medicare awv. Diagnoses and all orders for this visit:    Routine general medical examination at a health care facility              Fausto Lagunas is a 77 y.o. female being evaluated by a Virtual Visit (phone) encounter to address concerns as mentioned above. A caregiver was present when appropriate. Due to this being a TeleHealth encounter (During MOFDV-18 public health emergency), evaluation of the following organ systems was limited: Vitals/Constitutional/EENT/Resp/CV/GI//MS/Neuro/Skin/Heme-Lymph-Imm. Pursuant to the emergency declaration under the [address] [place] [address], [address] [place] [address] authority and the [employer] Act, this Virtual Visit was conducted with patient's (and/or legal guardian's) consent, to reduce the patient's risk of exposure to COVID-19 and provide necessary medical care. The patient (and/or legal guardian) has also been advised to contact this office for worsening conditions or problems, and seek emergency medical treatment and/or call 911 if deemed necessary. Patient identification was verified at the start of the visit: Yes    Services were provided through phone to substitute for in-person clinic visit. Patient and provider were located at their individual homes. --CHEVY Beavers NP on 6/1/2020 at 6:16 PM    An electronic signature was used to authenticate this note.

## 2020-06-04 RX ORDER — CLONAZEPAM 0.5 MG/1
0.5 TABLET ORAL NIGHTLY PRN
Qty: 30 TABLET | Refills: 2 | Status: SHIPPED | OUTPATIENT
Start: 2020-06-04 | End: 2020-09-17 | Stop reason: SDUPTHER

## 2020-06-04 NOTE — TELEPHONE ENCOUNTER
Ignacio khloe called and due to her insurance they need the rx to go to [facility]- [address] [address]  [place] [address]    Please let them know when this has been sent to them

## 2020-06-08 RX ORDER — POTASSIUM CHLORIDE 20 MEQ/1
TABLET, EXTENDED RELEASE ORAL
Qty: 180 TABLET | Refills: 1 | Status: SHIPPED | OUTPATIENT
Start: 2020-06-08 | End: 2020-08-21

## 2020-06-15 ENCOUNTER — OFFICE VISIT (OUTPATIENT)
Dept: FAMILY MEDICINE CLINIC | Age: 75
End: 2020-06-15
Payer: MEDICARE

## 2020-06-15 VITALS
TEMPERATURE: 97.2 F | WEIGHT: 98.6 LBS | BODY MASS INDEX: 18.14 KG/M2 | SYSTOLIC BLOOD PRESSURE: 134 MMHG | OXYGEN SATURATION: 95 % | DIASTOLIC BLOOD PRESSURE: 72 MMHG | HEART RATE: 74 BPM | HEIGHT: 62 IN

## 2020-06-15 DIAGNOSIS — E87.6 HYPOKALEMIA: ICD-10-CM

## 2020-06-15 LAB
ANION GAP SERPL CALCULATED.3IONS-SCNC: 12 MEQ/L (ref 9–15)
BUN BLDV-MCNC: 18 MG/DL (ref 8–23)
CALCIUM SERPL-MCNC: 8.9 MG/DL (ref 8.5–9.9)
CHLORIDE BLD-SCNC: 110 MEQ/L (ref 95–107)
CO2: 18 MEQ/L (ref 20–31)
CREAT SERPL-MCNC: 1.14 MG/DL (ref 0.5–0.9)
GFR AFRICAN AMERICAN: 56.4
GFR NON-AFRICAN AMERICAN: 46.6
GLUCOSE BLD-MCNC: 57 MG/DL (ref 70–99)
POTASSIUM SERPL-SCNC: 5.5 MEQ/L (ref 3.4–4.9)
SODIUM BLD-SCNC: 140 MEQ/L (ref 135–144)

## 2020-06-15 PROCEDURE — 99214 OFFICE O/P EST MOD 30 MIN: CPT | Performed by: FAMILY MEDICINE

## 2020-06-15 RX ORDER — AMLODIPINE BESYLATE 2.5 MG/1
2.5 TABLET ORAL DAILY
Qty: 30 TABLET | Refills: 5 | Status: SHIPPED | OUTPATIENT
Start: 2020-06-15 | End: 2020-06-30 | Stop reason: ALTCHOICE

## 2020-06-15 ASSESSMENT — ENCOUNTER SYMPTOMS
PHOTOPHOBIA: 0
ABDOMINAL PAIN: 0
CHEST TIGHTNESS: 0
ABDOMINAL DISTENTION: 0
SHORTNESS OF BREATH: 0

## 2020-06-15 NOTE — PROGRESS NOTES
Smokeless tobacco: Never Used   Substance and Sexual Activity    Alcohol use: No     Alcohol/week: 0.0 standard drinks    Drug use: No    Sexual activity: Not Currently     Partners: Male     Comment:    Lifestyle    Physical activity     Days per week: Not on file     Minutes per session: Not on file    Stress: Not on file   Relationships    Social connections     Talks on phone: Not on file     Gets together: Not on file     Attends Congregational service: Not on file     Active member of club or organization: Not on file     Attends meetings of clubs or organizations: Not on file     Relationship status: Not on file    Intimate partner violence     Fear of current or ex partner: Not on file     Emotionally abused: Not on file     Physically abused: Not on file     Forced sexual activity: Not on file   Other Topics Concern    Not on file   Social History Narrative    Not on file     Family History   Problem Relation Age of Onset    Arthritis Other     Heart Disease Other     High Blood Pressure Other     Kidney Disease Other     Other Other         respiratory problems    Breast Cancer Neg Hx     Colon Cancer Neg Hx     Diabetes Neg Hx     Cancer Neg Hx     Eclampsia Neg Hx     Ovarian Cancer Neg Hx     Hypertension Neg Hx      Labor Neg Hx     Spont Abortions Neg Hx     Stroke Neg Hx      Allergies:  Aspirin; Penicillins; and Adhesive tape    Review of Systems   Constitutional: Negative for activity change, appetite change, diaphoresis and unexpected weight change. Eyes: Negative for photophobia and visual disturbance. Respiratory: Negative for chest tightness and shortness of breath. No orthopnea   Cardiovascular: Positive for leg swelling. Negative for chest pain and palpitations. Gastrointestinal: Negative for abdominal distention and abdominal pain. Genitourinary: Negative for flank pain and frequency.    Musculoskeletal: Negative for gait problem and joint

## 2020-06-22 ENCOUNTER — OFFICE VISIT (OUTPATIENT)
Dept: FAMILY MEDICINE CLINIC | Age: 75
End: 2020-06-22
Payer: MEDICARE

## 2020-06-22 VITALS
HEIGHT: 62 IN | BODY MASS INDEX: 17.78 KG/M2 | HEART RATE: 77 BPM | DIASTOLIC BLOOD PRESSURE: 68 MMHG | SYSTOLIC BLOOD PRESSURE: 128 MMHG | WEIGHT: 96.6 LBS | TEMPERATURE: 97.3 F | OXYGEN SATURATION: 99 %

## 2020-06-22 PROBLEM — N95.0 POSTMENOPAUSAL BLEEDING: Status: ACTIVE | Noted: 2020-06-22

## 2020-06-22 PROCEDURE — 99214 OFFICE O/P EST MOD 30 MIN: CPT | Performed by: FAMILY MEDICINE

## 2020-06-22 NOTE — PATIENT INSTRUCTIONS
Stop potassium pills. Stop amlodipine. Patient will obtain drug Andres Saba home blood pressure monitor. If patient is noticing blood pressure top numbers higher than 150 she will restart amlodipine 2.5 mg. Or bottom number running 90-95 or higher. Repeat BMP blood work in the next week after stopping potassium and amlodipine to check kidney and potassium.

## 2020-06-22 NOTE — PROGRESS NOTES
Subjective:      Patient ID: Lorelee Bamberger is a 77 y.o. female who presents for:  Chief Complaint   Patient presents with    Check-Up     x 1 week F/u swelling and bp one week    Health Maintenance     [facility] order by DR smith        Patient is noticing continued leg swelling    Hypertension   This is a chronic problem. The current episode started more than 1 year ago. The problem is unchanged. The problem is controlled. Associated symptoms include peripheral edema. Pertinent negatives include no chest pain, headaches, palpitations or shortness of breath. There are no associated agents to hypertension. Risk factors for coronary artery disease include post-menopausal state. Past treatments include angiotensin blockers and calcium channel blockers. The current treatment provides moderate improvement. Compliance problems include exercise. There is no history of angina. Current Outpatient Medications on File Prior to Visit   Medication Sig Dispense Refill    amLODIPine (NORVASC) 2.5 MG tablet Take 1 tablet by mouth daily 30 tablet 5    potassium chloride (KLOR-CON M) 20 MEQ extended release tablet TAKE 1 TABLET BY MOUTH TWICE DAILY 180 tablet 1    clonazePAM (KLONOPIN) 0.5 MG tablet Take 1 tablet by mouth nightly as needed for Anxiety for up to 90 days.  30 tablet 2    losartan (COZAAR) 50 MG tablet TAKE 1 TABLET BY MOUTH DAILY 90 tablet 1    Multiple Vitamins-Minerals (THERAPEUTIC MULTIVITAMIN-MINERALS) tablet Take 1 tablet by mouth daily 90 tablet 3    mirtazapine (REMERON) 15 MG tablet Take 1 tablet by mouth nightly 30 tablet 3    albuterol sulfate HFA (VENTOLIN HFA) 108 (90 Base) MCG/ACT inhaler Inhale 2 puffs into the lungs every 6 hours as needed for Wheezing 3 Inhaler 3    Magnesium Oxide 400 MG CAPS Take 400 mg by mouth 3 times daily 270 capsule 3    donepezil (ARICEPT) 5 MG tablet Take 1 tablet by mouth nightly 30 tablet 5    Oxyquinoline-Sod Lauryl Sulf (TRIMO-SONG) 0.025-0.01 % GEL Place 113.4 g vaginally Twice a Week 113.4 g 3    clopidogrel (PLAVIX) 75 MG tablet Take 1 tablet by mouth daily 90 tablet 3    clotrimazole-betamethasone (LOTRISONE) 1-0.05 % cream Apply topically 2 times daily. 45 g 5    lipase-protease-amylase (CREON) 78587-11211 units delayed release capsule Take 1 capsule by mouth 2 times daily 180 capsule 3    pantoprazole (PROTONIX) 40 MG tablet Take 1 tablet by mouth daily 90 tablet 3    mupirocin (BACTROBAN) 2 % ointment MICHAELLE TOPICALLY AA TID FOR 10 DAYS 15 g 0    propranolol (INDERAL) 40 MG tablet Take 1 tablet by mouth 2 times daily 180 tablet 3     No current facility-administered medications on file prior to visit.       Past Medical History:   Diagnosis Date    Bowel disease     Cataract     Chronic back pain     COPD (chronic obstructive pulmonary disease) (HCC)     Dementia (HCC)     Dizziness and giddiness     GERD (gastroesophageal reflux disease)     Glaucoma     Hypertension     Osteoarthritis     Pancreatitis     Seizures (HCC)     TIA (transient ischemic attack)      Past Surgical History:   Procedure Laterality Date    APPENDECTOMY      CHOLECYSTECTOMY      INTESTINAL BYPASS      [email or web address]     Social History     Socioeconomic History    Marital status:      Spouse name: Not on file    Number of children: Not on file    Years of education: Not on file    Highest education level: Not on file   Occupational History    Not on file   Social Needs    Financial resource strain: Not on file    Food insecurity     Worry: Not on file     Inability: Not on file    Transportation needs     Medical: Not on file     Non-medical: Not on file   Tobacco Use    Smoking status: Current Every Day Smoker     Packs/day: 3.00     Years: 54.00     Pack years: 162.00     Types: Cigarettes    Smokeless tobacco: Never Used   Substance and Sexual Activity    Alcohol use: No     Alcohol/week: 0.0 standard drinks    Drug use: No    Sexual activity: Not Currently     Partners: Male     Comment:    Lifestyle    Physical activity     Days per week: Not on file     Minutes per session: Not on file    Stress: Not on file   Relationships    Social connections     Talks on phone: Not on file     Gets together: Not on file     Attends Restoration service: Not on file     Active member of club or organization: Not on file     Attends meetings of clubs or organizations: Not on file     Relationship status: Not on file    Intimate partner violence     Fear of current or ex partner: Not on file     Emotionally abused: Not on file     Physically abused: Not on file     Forced sexual activity: Not on file   Other Topics Concern    Not on file   Social History Narrative    Not on file     Family History   Problem Relation Age of Onset    Arthritis Other     Heart Disease Other     High Blood Pressure Other     Kidney Disease Other     Other Other         respiratory problems    Breast Cancer Neg Hx     Colon Cancer Neg Hx     Diabetes Neg Hx     Cancer Neg Hx     Eclampsia Neg Hx     Ovarian Cancer Neg Hx     Hypertension Neg Hx      Labor Neg Hx     Spont Abortions Neg Hx     Stroke Neg Hx      Allergies:  Aspirin; Penicillins; and Adhesive tape    Review of Systems   Constitutional: Negative for chills, fatigue and fever. Respiratory: Negative for cough, shortness of breath and wheezing. Cardiovascular: Positive for leg swelling. Negative for chest pain and palpitations. Gastrointestinal: Negative for abdominal distention. Endocrine: Negative for polyuria. Genitourinary: Negative for difficulty urinating. Neurological: Negative for headaches. Psychiatric/Behavioral: Negative for sleep disturbance. Objective:   /68   Pulse 77   Temp 97.3 °F (36.3 °C)   Ht 5' 2\" (1.575 m)   Wt 96 lb 9.6 oz (43.8 kg)   SpO2 99%   BMI 17.67 kg/m²     Physical Exam  Constitutional:       General: She is not in acute distress. Appearance: She is well-developed. She is not diaphoretic. HENT:      Head: Normocephalic and atraumatic. Right Ear: External ear normal.      Left Ear: External ear normal.      Nose: Nose normal.   Eyes:      General:         Right eye: No discharge. Left eye: No discharge. Conjunctiva/sclera: Conjunctivae normal.      Pupils: Pupils are equal, round, and reactive to light. Neck:      Musculoskeletal: Neck supple. Thyroid: No thyromegaly. Trachea: No tracheal deviation. Cardiovascular:      Rate and Rhythm: Normal rate and regular rhythm. Pulmonary:      Effort: Pulmonary effort is normal. No respiratory distress. Abdominal:      General: There is no distension. Musculoskeletal:      Right lower leg: Edema present. Left lower leg: Edema present. Comments: Treatments taken above each medial malleolus. Right lower extremity 7.5 inches. Left lower extremity 8.5 inches. Skin:     General: Skin is warm and dry. Findings: No bruising or rash. Neurological:      Mental Status: She is alert. Coordination: Coordination normal.   Psychiatric:         Thought Content: Thought content normal.         Judgment: Judgment normal.         No results found for this visit on 06/22/20.     Recent Results (from the past 2016 hour(s))   Basic Metabolic Panel    Collection Time: 05/08/20  1:48 PM   Result Value Ref Range    Sodium 136 135 - 144 mEq/L    Potassium 5.7 (H) 3.4 - 4.9 mEq/L    Chloride 105 95 - 107 mEq/L    CO2 13 (L) 20 - 31 mEq/L    Anion Gap 18 (H) 9 - 15 mEq/L    Glucose 92 70 - 99 mg/dL    BUN 24 (H) 8 - 23 mg/dL    CREATININE 1.57 (H) 0.50 - 0.90 mg/dL    GFR Non- 32.2 (L) >60    GFR  39.0 (L) >60    Calcium 9.8 8.5 - 9.9 mg/dL   CBC    Collection Time: 05/08/20  1:48 PM   Result Value Ref Range    WBC 6.7 4.8 - 10.8 K/uL    RBC 4.73 4.20 - 5.40 M/uL    Hemoglobin 15.3 12.0 - 16.0 g/dL    Hematocrit 47.2 (H) 37.0 - 47.0 % MCV 99.9 82.0 - 100.0 fL    MCH 32.4 (H) 27.0 - 31.3 pg    MCHC 32.4 (L) 33.0 - 37.0 %    RDW 16.5 (H) 11.5 - 14.5 %    Platelets 940 811 - 090 K/uL   TSH without Reflex    Collection Time: 05/08/20  1:48 PM   Result Value Ref Range    TSH 3.840 0.440 - 3.860 uIU/mL   Prealbumin    Collection Time: 05/08/20  1:48 PM   Result Value Ref Range    Prealbumin 22.1 20.0 - 40.0 mg/dL   Basic Metabolic Panel    Collection Time: 06/15/20  2:31 PM   Result Value Ref Range    Sodium 140 135 - 144 mEq/L    Potassium 5.5 (H) 3.4 - 4.9 mEq/L    Chloride 110 (H) 95 - 107 mEq/L    CO2 18 (L) 20 - 31 mEq/L    Anion Gap 12 9 - 15 mEq/L    Glucose 57 (L) 70 - 99 mg/dL    BUN 18 8 - 23 mg/dL    CREATININE 1.14 (H) 0.50 - 0.90 mg/dL    GFR Non-African American 46.6 (L) >60    GFR  56.4 (L) >60    Calcium 8.9 8.5 - 9.9 mg/dL   Basic Metabolic Panel    Collection Time: 06/25/20  3:30 PM   Result Value Ref Range    Sodium 142 135 - 144 mEq/L    Potassium 3.6 3.4 - 4.9 mEq/L    Chloride 110 (H) 95 - 107 mEq/L    CO2 19 (L) 20 - 31 mEq/L    Anion Gap 13 9 - 15 mEq/L    Glucose 82 70 - 99 mg/dL    BUN 21 8 - 23 mg/dL    CREATININE 1.02 (H) 0.50 - 0.90 mg/dL    GFR Non-African American 53.0 (L) >60    GFR  >60.0 >60    Calcium 8.8 8.5 - 9.9 mg/dL   Magnesium    Collection Time: 06/25/20  3:30 PM   Result Value Ref Range    Magnesium 1.4 (L) 1.7 - 2.4 mg/dL           Assessment:       Diagnosis Orders   1. Bilateral leg edema  Basic Metabolic Panel    Magnesium   2. Hyperkalemia  Basic Metabolic Panel    Magnesium   3.  Essential hypertension           Orders Placed This Encounter   Procedures    Basic Metabolic Panel     Standing Status:   Future     Number of Occurrences:   1     Standing Expiration Date:   6/22/2021    Magnesium     Standing Status:   Future     Number of Occurrences:   1     Standing Expiration Date:   6/22/2021         Plan:   Return in about 1 week (around 6/29/2020) for add in if

## 2020-06-25 ENCOUNTER — OFFICE VISIT (OUTPATIENT)
Dept: PULMONOLOGY | Age: 75
End: 2020-06-25
Payer: MEDICARE

## 2020-06-25 VITALS
SYSTOLIC BLOOD PRESSURE: 138 MMHG | OXYGEN SATURATION: 95 % | RESPIRATION RATE: 16 BRPM | HEART RATE: 89 BPM | TEMPERATURE: 97.7 F | DIASTOLIC BLOOD PRESSURE: 82 MMHG | HEIGHT: 62 IN | WEIGHT: 91 LBS | BODY MASS INDEX: 16.75 KG/M2

## 2020-06-25 DIAGNOSIS — R60.0 BILATERAL LEG EDEMA: ICD-10-CM

## 2020-06-25 DIAGNOSIS — E87.5 HYPERKALEMIA: ICD-10-CM

## 2020-06-25 LAB
ANION GAP SERPL CALCULATED.3IONS-SCNC: 13 MEQ/L (ref 9–15)
BUN BLDV-MCNC: 21 MG/DL (ref 8–23)
CALCIUM SERPL-MCNC: 8.8 MG/DL (ref 8.5–9.9)
CHLORIDE BLD-SCNC: 110 MEQ/L (ref 95–107)
CO2: 19 MEQ/L (ref 20–31)
CREAT SERPL-MCNC: 1.02 MG/DL (ref 0.5–0.9)
GFR AFRICAN AMERICAN: >60
GFR NON-AFRICAN AMERICAN: 53
GLUCOSE BLD-MCNC: 82 MG/DL (ref 70–99)
MAGNESIUM: 1.4 MG/DL (ref 1.7–2.4)
POTASSIUM SERPL-SCNC: 3.6 MEQ/L (ref 3.4–4.9)
SODIUM BLD-SCNC: 142 MEQ/L (ref 135–144)

## 2020-06-25 PROCEDURE — 99214 OFFICE O/P EST MOD 30 MIN: CPT | Performed by: INTERNAL MEDICINE

## 2020-06-25 RX ORDER — ALBUTEROL SULFATE 2.5 MG/3ML
2.5 SOLUTION RESPIRATORY (INHALATION) EVERY 4 HOURS PRN
Qty: 360 EACH | Refills: 1 | Status: SHIPPED | OUTPATIENT
Start: 2020-06-25 | End: 2021-08-24

## 2020-06-25 ASSESSMENT — ENCOUNTER SYMPTOMS
VOICE CHANGE: 0
RHINORRHEA: 0
DIARRHEA: 0
EYE DISCHARGE: 0
SINUS PRESSURE: 0
NAUSEA: 0
SORE THROAT: 0
WHEEZING: 1
CHEST TIGHTNESS: 0
TROUBLE SWALLOWING: 0
ABDOMINAL PAIN: 0
EYE ITCHING: 0
COUGH: 1
SHORTNESS OF BREATH: 1
VOMITING: 0

## 2020-06-25 NOTE — PROGRESS NOTES
on file   Tobacco Use    Smoking status: Current Every Day Smoker     Packs/day: 3.00     Years: 54.00     Pack years: 162.00     Types: Cigarettes    Smokeless tobacco: Never Used   Substance and Sexual Activity    Alcohol use: No     Alcohol/week: 0.0 standard drinks    Drug use: No    Sexual activity: Not Currently     Partners: Male     Comment:    Lifestyle    Physical activity     Days per week: Not on file     Minutes per session: Not on file    Stress: Not on file   Relationships    Social connections     Talks on phone: Not on file     Gets together: Not on file     Attends Islam service: Not on file     Active member of club or organization: Not on file     Attends meetings of clubs or organizations: Not on file     Relationship status: Not on file    Intimate partner violence     Fear of current or ex partner: Not on file     Emotionally abused: Not on file     Physically abused: Not on file     Forced sexual activity: Not on file   Other Topics Concern    Not on file   Social History Narrative    Not on file         Review of Systems   Constitutional: Negative for chills, diaphoresis, fatigue and fever. HENT: Negative for congestion, mouth sores, nosebleeds, postnasal drip, rhinorrhea, sinus pressure, sneezing, sore throat, trouble swallowing and voice change. Eyes: Negative for discharge, itching and visual disturbance. Respiratory: Positive for cough, shortness of breath and wheezing. Negative for chest tightness. Cardiovascular: Negative for chest pain, palpitations and leg swelling. Gastrointestinal: Negative for abdominal pain, diarrhea, nausea and vomiting. Genitourinary: Negative for difficulty urinating and hematuria. Musculoskeletal: Negative for arthralgias, joint swelling and myalgias. Skin: Negative for rash. Allergic/Immunologic: Negative for environmental allergies and food allergies.    Neurological: Negative for dizziness, tremors, weakness and Tiotropium Bromide-Olodaterol 2.5-2.5 MCG/ACT AERS Inhale 2 puffs into the lungs daily 1 Inhaler 5    losartan (COZAAR) 50 MG tablet TAKE 1 TABLET BY MOUTH DAILY 90 tablet 1    Multiple Vitamins-Minerals (THERAPEUTIC MULTIVITAMIN-MINERALS) tablet Take 1 tablet by mouth daily 90 tablet 3    mirtazapine (REMERON) 15 MG tablet Take 1 tablet by mouth nightly 30 tablet 3    albuterol (PROVENTIL) (2.5 MG/3ML) 0.083% nebulizer solution Take 3 mLs by nebulization every 4 hours as needed for Wheezing 120 each 3    albuterol sulfate HFA (VENTOLIN HFA) 108 (90 Base) MCG/ACT inhaler Inhale 2 puffs into the lungs every 6 hours as needed for Wheezing 3 Inhaler 3    Magnesium Oxide 400 MG CAPS Take 400 mg by mouth 3 times daily 270 capsule 3    donepezil (ARICEPT) 5 MG tablet Take 1 tablet by mouth nightly 30 tablet 5    Oxyquinoline-Sod Lauryl Sulf (TRIMO-SONG) 0.025-0.01 % GEL Place 113.4 g vaginally Twice a Week 113.4 g 3    clopidogrel (PLAVIX) 75 MG tablet Take 1 tablet by mouth daily 90 tablet 3    clotrimazole-betamethasone (LOTRISONE) 1-0.05 % cream Apply topically 2 times daily. 45 g 5    lipase-protease-amylase (CREON) 07847-38640 units delayed release capsule Take 1 capsule by mouth 2 times daily 180 capsule 3    pantoprazole (PROTONIX) 40 MG tablet Take 1 tablet by mouth daily 90 tablet 3    mupirocin (BACTROBAN) 2 % ointment MICHAELLE TOPICALLY AA TID FOR 10 DAYS 15 g 0    propranolol (INDERAL) 40 MG tablet Take 1 tablet by mouth 2 times daily 180 tablet 3     No current facility-administered medications for this visit. Results for orders placed during the hospital encounter of 05/08/20   XR CHEST STANDARD (2 VW)    Narrative XR CHEST (2 VW)    Exam Date/Time:  5/8/2020 2:12 PM  Clinical History:  J44.1 COPD exacerbation (HCC) ICD10  shorts of breath, difficulty breathing, weight loss, history of COPD. Comparison:  3/23/2020      RESULT:         Lungs and pleura:  Emphysematous changes.  No focal consolidation. Biapical pleural-parenchymal scarring. Scarring at the lung bases. No pleural effusion. No pneumothorax. Normal pulmonary vascular pattern. Cardiomediastinal silhouette:  Normal.    Other:  No acute osseous findings. Surgical clip upper abdomen near midline. Impression No acute radiographic abnormality. Emphysema.   ]  Results for orders placed during the hospital encounter of 03/23/20   XR CHEST PORTABLE    Narrative EXAMINATION: PORTABLE CHEST X-RAY FROM 3/23/2020 AT 17:52 HOURS    CLINICAL HISTORY: SMOKING HISTORY AND INTERMITTENT DYSPNEA X2 WEEKS    COMPARISONS: 5/19/2015    FINDINGS: The heart is not enlarged. There is mild atherosclerotic calcification and tortuosity of the aorta. There is hyperinflation of the lungs suggesting COPD. There is blunting of the right CP angle similar to the prior chest film and this likely   represents pleural scarring in the right lung base. No acute pulmonary infiltrates or pleural effusions. No pneumothorax. There is degenerative bone spurring in the spine. Impression 1. NO RADIOGRAPHIC EVIDENCE OF ACTIVE DISEASE IN THE CHEST. 2. HYPERINFLATION OF THE LUNGS SUGGESTIVE OF COPD.   ]  No results found for this or any previous visit.]  Results for orders placed during the hospital encounter of 03/23/20   CT CHEST WO CONTRAST    Narrative CT of the Chest is contrast medium. History:  55 year smoker. Complains weight loss the. Rule out mass. Technical Factors:    CT imaging of the chest was obtained and formatted as 5 mm contiguous axial images from the thoracic inlet through the adrenal glands. Sagittal coronal reconstruction obtained during postprocessing. Intravenous contrast medium:  None    Comparison:  Chest radiograph March 23, 2020, May 19, 2015. Findings:    Bilateral diffuse emphysematous change. No nodules, and no masses bilaterally. Scant dependent subsegmental atelectatic change posterior right lung base.  No pleural effusion. No hilar, mediastinal, or axillary lymph node enlargement. Thoracic aorta normal in course and caliber. Cardiac size normal. No pericardial effusion. Coronary calcification demonstrated. Limited imaging upper abdomen shows no gross anomalies. No osteoblastic, and no osteolytic lesions. Diffuse anterior osteophytes thoracic spine. Impression Emphysema. No lung masses identified. All CT scans at this facility use dose modulation, iterative reconstruction, and/or weight based dosing when appropriate to reduce radiation dose to as low as reasonably achievable. Assessment/Plan:     1. Chronic obstructive pulmonary disease, unspecified COPD type ([facility] [address].  She is on Nebulizer with Albuterol and albuterol inhaler. She was started on stiolto 2 puff in AM   C/o shortness of breath with exertion. C/o Occasional Wheezing . Cough with clear  Sputum. Continue bronchodilator as before. CT chest and CXR show Emphysema. No lung masses identified. 2. Shortness of breath  Patient is having  Chronic shortness of breath which is likely due to COPD, continue  Bronchodilator, as before. keep  Spo2 90% or above. Return in about 4 months (around 10/25/2020) for COPD.       Rodriguez Avila MD

## 2020-06-29 ASSESSMENT — ENCOUNTER SYMPTOMS
COUGH: 0
WHEEZING: 0
ABDOMINAL DISTENTION: 0
SHORTNESS OF BREATH: 0

## 2020-06-30 ENCOUNTER — OFFICE VISIT (OUTPATIENT)
Dept: FAMILY MEDICINE CLINIC | Age: 75
End: 2020-06-30
Payer: MEDICARE

## 2020-06-30 VITALS
BODY MASS INDEX: 16.89 KG/M2 | SYSTOLIC BLOOD PRESSURE: 128 MMHG | TEMPERATURE: 97.1 F | OXYGEN SATURATION: 98 % | HEIGHT: 62 IN | WEIGHT: 91.8 LBS | HEART RATE: 84 BPM | DIASTOLIC BLOOD PRESSURE: 82 MMHG

## 2020-06-30 PROCEDURE — 99214 OFFICE O/P EST MOD 30 MIN: CPT | Performed by: FAMILY MEDICINE

## 2020-06-30 ASSESSMENT — ENCOUNTER SYMPTOMS
PHOTOPHOBIA: 0
ABDOMINAL DISTENTION: 0
SHORTNESS OF BREATH: 0
ABDOMINAL PAIN: 0
CHEST TIGHTNESS: 0

## 2020-06-30 NOTE — PROGRESS NOTES
topically 2 times daily. 45 g 5    lipase-protease-amylase (CREON) 26490-57561 units delayed release capsule Take 1 capsule by mouth 2 times daily 180 capsule 3    pantoprazole (PROTONIX) 40 MG tablet Take 1 tablet by mouth daily 90 tablet 3    mupirocin (BACTROBAN) 2 % ointment MICHAELLE TOPICALLY AA TID FOR 10 DAYS 15 g 0    propranolol (INDERAL) 40 MG tablet Take 1 tablet by mouth 2 times daily 180 tablet 3     No current facility-administered medications on file prior to visit.       Past Medical History:   Diagnosis Date    Bowel disease     Cataract     Chronic back pain     COPD (chronic obstructive pulmonary disease) (HCC)     Dementia (HCC)     Dizziness and giddiness     GERD (gastroesophageal reflux disease)     Glaucoma     Hypertension     Osteoarthritis     Pancreatitis     Seizures (HCC)     TIA (transient ischemic attack)      Past Surgical History:   Procedure Laterality Date    APPENDECTOMY      CHOLECYSTECTOMY      INTESTINAL BYPASS      jejunoileal     Social History     Socioeconomic History    Marital status:      Spouse name: Not on file    Number of children: Not on file    Years of education: Not on file    Highest education level: Not on file   Occupational History    Not on file   Social Needs    Financial resource strain: Not on file    Food insecurity     Worry: Not on file     Inability: Not on file   Immedia needs     Medical: Not on file     Non-medical: Not on file   Tobacco Use    Smoking status: Current Every Day Smoker     Packs/day: 3.00     Years: 54.00     Pack years: 162.00     Types: Cigarettes    Smokeless tobacco: Never Used   Substance and Sexual Activity    Alcohol use: No     Alcohol/week: 0.0 standard drinks    Drug use: No    Sexual activity: Not Currently     Partners: Male     Comment:    Lifestyle    Physical activity     Days per week: Not on file     Minutes per session: Not on file    Stress: Not on file Relationships    Social connections     Talks on phone: Not on file     Gets together: Not on file     Attends Denominational service: Not on file     Active member of club or organization: Not on file     Attends meetings of clubs or organizations: Not on file     Relationship status: Not on file    Intimate partner violence     Fear of current or ex partner: Not on file     Emotionally abused: Not on file     Physically abused: Not on file     Forced sexual activity: Not on file   Other Topics Concern    Not on file   Social History Narrative    Not on file     Family History   Problem Relation Age of Onset    Arthritis Other     Heart Disease Other     High Blood Pressure Other     Kidney Disease Other     Other Other         respiratory problems    Breast Cancer Neg Hx     Colon Cancer Neg Hx     Diabetes Neg Hx     Cancer Neg Hx     Eclampsia Neg Hx     Ovarian Cancer Neg Hx     Hypertension Neg Hx      Labor Neg Hx     Spont Abortions Neg Hx     Stroke Neg Hx      Allergies:  Aspirin; Penicillins; and Adhesive tape    Review of Systems   Constitutional: Negative for activity change, appetite change, diaphoresis and unexpected weight change. Eyes: Negative for photophobia and visual disturbance. Respiratory: Negative for chest tightness and shortness of breath. No orthopnea   Cardiovascular: Negative for chest pain, palpitations and leg swelling. Gastrointestinal: Negative for abdominal distention and abdominal pain. Genitourinary: Negative for flank pain and frequency. Musculoskeletal: Negative for gait problem and joint swelling. Neurological: Negative for dizziness, weakness, light-headedness and headaches. Psychiatric/Behavioral: Negative for confusion.        Objective:   /82   Pulse 84   Temp 97.1 °F (36.2 °C)   Ht 5' 2\" (1.575 m)   Wt 91 lb 12.8 oz (41.6 kg)   SpO2 98%   BMI 16.79 kg/m²     Physical Exam  Constitutional:       Appearance: Normal supraclavicular adenopathy. Skin:     General: Skin is warm and dry. Findings: No bruising or rash. Nails: There is no clubbing. Neurological:      Mental Status: She is alert. Cranial Nerves: Cranial nerve deficit: CN II-XII GI without obvious deficit. Sensory: Sensory deficit: grossly intact for hands. Motor: No tremor. Atrophy: B UE and LE without atrophy. Abnormal muscle tone: B UE and LE have normal tone. Coordination: Coordination normal.      Gait: Gait normal.   Psychiatric:         Attention and Perception: She is attentive. Mood and Affect: Mood is not anxious. Affect is not inappropriate. Speech: Speech normal.         Behavior: Behavior is not agitated. Behavior is cooperative. Judgment: Judgment normal.         No results found for this visit on 06/30/20.     Recent Results (from the past 2016 hour(s))   Basic Metabolic Panel    Collection Time: 05/08/20  1:48 PM   Result Value Ref Range    Sodium 136 135 - 144 mEq/L    Potassium 5.7 (H) 3.4 - 4.9 mEq/L    Chloride 105 95 - 107 mEq/L    CO2 13 (L) 20 - 31 mEq/L    Anion Gap 18 (H) 9 - 15 mEq/L    Glucose 92 70 - 99 mg/dL    BUN 24 (H) 8 - 23 mg/dL    CREATININE 1.57 (H) 0.50 - 0.90 mg/dL    GFR Non- 32.2 (L) >60    GFR  39.0 (L) >60    Calcium 9.8 8.5 - 9.9 mg/dL   CBC    Collection Time: 05/08/20  1:48 PM   Result Value Ref Range    WBC 6.7 4.8 - 10.8 K/uL    RBC 4.73 4.20 - 5.40 M/uL    Hemoglobin 15.3 12.0 - 16.0 g/dL    Hematocrit 47.2 (H) 37.0 - 47.0 %    MCV 99.9 82.0 - 100.0 fL    MCH 32.4 (H) 27.0 - 31.3 pg    MCHC 32.4 (L) 33.0 - 37.0 %    RDW 16.5 (H) 11.5 - 14.5 %    Platelets 796 138 - 447 K/uL   TSH without Reflex    Collection Time: 05/08/20  1:48 PM   Result Value Ref Range    TSH 3.840 0.440 - 3.860 uIU/mL   Prealbumin    Collection Time: 05/08/20  1:48 PM   Result Value Ref Range    Prealbumin 22.1 20.0 - 40.0 mg/dL   Basic Metabolic Panel magnesium is normally found in the blood. Why is this test done? This test is used to find a cause for nerve and muscle problems. These include muscle twitches and weakness and irritability. It can see if people with heart problems need extra magnesium. Other uses are to check levels when magnesium is being given for medical treatment and check how well your kidneys are working. How do you prepare for the test?  Don't take medicines containing magnesium for at least 3 days before this test. This includes:  · Antacids that contain magnesium. · Laxatives (such as milk of magnesia or Epsom salts). · Magnesium supplements. · Some diuretics. How is the test done? A health professional uses a needle to take a blood sample, usually from the arm. How long does the test take? The test will take a few minutes. What happens after the test?  · You will probably be able to go home right away. · You can go back to your usual activities right away. Follow-up care is a key part of your treatment and safety. Be sure to make and go to all appointments, and call your doctor if you are having problems. It's also a good idea to keep a list of the medicines you take. Ask your doctor when you can expect to have your test results. Where can you learn more? Go to [email or web address] and sign in to your [employer] account. Enter Z160 in the Rev[employer]uate box to learn more about \"Magnesium Test: About This Test.\"     If you do not have an account, please click on the \"Sign Up Now\" link. Current as of: December 9, 2019               Content Version: 12.5  © 1793-9148 Healthwise, Incorporated. Care instructions adapted under license by [facility] ([facility]). If you have questions about a medical condition or this instruction, always ask your healthcare professional. [place] [address] any warranty or liability for your use of this information. Alirio Arguelles M.D.

## 2020-06-30 NOTE — PATIENT INSTRUCTIONS
learn more about \"Magnesium Test: About This Test.\"     If you do not have an account, please click on the \"Sign Up Now\" link. Current as of: December 9, 2019               Content Version: 12.5  © 0542-2442 Healthwise, Incorporated. Care instructions adapted under license by [facility] ([facility]). If you have questions about a medical condition or this instruction, always ask your healthcare professional. [address] any warranty or liability for your use of this information.

## 2020-07-17 DIAGNOSIS — E83.42 HYPOMAGNESEMIA: ICD-10-CM

## 2020-07-17 DIAGNOSIS — E87.5 HYPERKALEMIA: ICD-10-CM

## 2020-07-17 LAB
ANION GAP SERPL CALCULATED.3IONS-SCNC: 10 MEQ/L (ref 9–15)
BUN BLDV-MCNC: 15 MG/DL (ref 8–23)
CALCIUM SERPL-MCNC: 8.9 MG/DL (ref 8.5–9.9)
CHLORIDE BLD-SCNC: 113 MEQ/L (ref 95–107)
CO2: 22 MEQ/L (ref 20–31)
CREAT SERPL-MCNC: 1.02 MG/DL (ref 0.5–0.9)
GFR AFRICAN AMERICAN: >60
GFR NON-AFRICAN AMERICAN: 52.9
GLUCOSE BLD-MCNC: 85 MG/DL (ref 70–99)
MAGNESIUM: 2 MG/DL (ref 1.7–2.4)
POTASSIUM SERPL-SCNC: 4.3 MEQ/L (ref 3.4–4.9)
SODIUM BLD-SCNC: 145 MEQ/L (ref 135–144)

## 2020-07-20 RX ORDER — DONEPEZIL HYDROCHLORIDE 5 MG/1
5 TABLET, FILM COATED ORAL NIGHTLY
Qty: 30 TABLET | Refills: 5 | Status: SHIPPED | OUTPATIENT
Start: 2020-07-20 | End: 2021-01-25 | Stop reason: SDUPTHER

## 2020-08-21 ENCOUNTER — OFFICE VISIT (OUTPATIENT)
Dept: FAMILY MEDICINE CLINIC | Age: 75
End: 2020-08-21
Payer: MEDICARE

## 2020-08-21 VITALS
HEIGHT: 62 IN | TEMPERATURE: 97.5 F | HEART RATE: 85 BPM | WEIGHT: 95 LBS | BODY MASS INDEX: 17.48 KG/M2 | SYSTOLIC BLOOD PRESSURE: 120 MMHG | OXYGEN SATURATION: 97 % | DIASTOLIC BLOOD PRESSURE: 70 MMHG

## 2020-08-21 DIAGNOSIS — E83.42 HYPOMAGNESEMIA: ICD-10-CM

## 2020-08-21 DIAGNOSIS — E87.5 HYPERKALEMIA: ICD-10-CM

## 2020-08-21 LAB
ANION GAP SERPL CALCULATED.3IONS-SCNC: 10 MEQ/L (ref 9–15)
BUN BLDV-MCNC: 28 MG/DL (ref 8–23)
CALCIUM SERPL-MCNC: 9 MG/DL (ref 8.5–9.9)
CHLORIDE BLD-SCNC: 111 MEQ/L (ref 95–107)
CO2: 21 MEQ/L (ref 20–31)
CREAT SERPL-MCNC: 1.14 MG/DL (ref 0.5–0.9)
GFR AFRICAN AMERICAN: 56.3
GFR NON-AFRICAN AMERICAN: 46.6
GLUCOSE BLD-MCNC: 100 MG/DL (ref 70–99)
MAGNESIUM: 2 MG/DL (ref 1.7–2.4)
POTASSIUM SERPL-SCNC: 5.5 MEQ/L (ref 3.4–4.9)
SODIUM BLD-SCNC: 142 MEQ/L (ref 135–144)

## 2020-08-21 PROCEDURE — 99213 OFFICE O/P EST LOW 20 MIN: CPT | Performed by: FAMILY MEDICINE

## 2020-08-21 NOTE — PROGRESS NOTES
Chief Complaint   Patient presents with    Depression     3 month        HPI:  Margarita Dao is a 77 y.o. female     3 month follow up    Ongoing weakness of legs  Doing better    Had several visits recently with Dr. Dacia Dawson to new one floor small home    Living with daughter  Cooking for her      Wt Readings from Last 3 Encounters:   08/21/20 95 lb (43.1 kg)   06/30/20 91 lb 12.8 oz (41.6 kg)   06/25/20 91 lb (41.3 kg)         Compliant with meds for HTN    GERD, on protonix  COPD, uses albuterol    plavix for reported hx of TIA    She had ileojejunal bypass in 1978  She drinks ensure        Wt Readings from Last 3 Encounters:   08/21/20 95 lb (43.1 kg)   06/30/20 91 lb 12.8 oz (41.6 kg)   06/25/20 91 lb (41.3 kg)         Patient Active Problem List   Diagnosis    Hypertensive disorder    Venous tributary (branch) occlusion of retina    Cortical senile cataract    Macular degeneration, age related, nonexudative    Nuclear senile cataract    Gastroesophageal reflux disease    Chronic obstructive pulmonary disease (HCC)    History of disease    Cobalamin deficiency    Anxiety    Chronic obstructive lung disease ([facility] [address]    COPD exacerbation (HCC)    Postmenopausal bleeding       Current Outpatient Medications   Medication Sig Dispense Refill    donepezil (ARICEPT) 5 MG tablet Take 1 tablet by mouth nightly 30 tablet 5    Tiotropium Bromide-Olodaterol 2.5-2.5 MCG/ACT AERS Inhale 2 puffs into the lungs daily 3 Inhaler 1    albuterol (PROVENTIL) (2.5 MG/3ML) 0.083% nebulizer solution Take 3 mLs by nebulization every 4 hours as needed for Wheezing 360 each 1    clonazePAM (KLONOPIN) 0.5 MG tablet Take 1 tablet by mouth nightly as needed for Anxiety for up to 90 days.  30 tablet 2    losartan (COZAAR) 50 MG tablet TAKE 1 TABLET BY MOUTH DAILY 90 tablet 1    Multiple Vitamins-Minerals (THERAPEUTIC MULTIVITAMIN-MINERALS) tablet Take 1 tablet by mouth daily 90 tablet 3    albuterol sulfate HFA (VENTOLIN HFA) 108 (90 Base) MCG/ACT inhaler Inhale 2 puffs into the lungs every 6 hours as needed for Wheezing 3 Inhaler 3    Magnesium Oxide 400 MG CAPS Take 400 mg by mouth 3 times daily 270 capsule 3    Oxyquinoline-Sod Lauryl Sulf (TRIMO-SONG) 0.025-0.01 % GEL Place 113.4 g vaginally Twice a Week 113.4 g 3    clopidogrel (PLAVIX) 75 MG tablet Take 1 tablet by mouth daily 90 tablet 3    clotrimazole-betamethasone (LOTRISONE) 1-0.05 % cream Apply topically 2 times daily. 45 g 5    lipase-protease-amylase (CREON) 02225-16303 units delayed release capsule Take 1 capsule by mouth 2 times daily 180 capsule 3    pantoprazole (PROTONIX) 40 MG tablet Take 1 tablet by mouth daily 90 tablet 3    mupirocin (BACTROBAN) 2 % ointment MICHAELLE TOPICALLY AA TID FOR 10 DAYS 15 g 0     No current facility-administered medications for this visit.           Past Medical History:   Diagnosis Date    Bowel disease     Cataract     Chronic back pain     COPD (chronic obstructive pulmonary disease) (HCC)     Dementia (HCC)     Dizziness and giddiness     GERD (gastroesophageal reflux disease)     Glaucoma     Hypertension     Osteoarthritis     Pancreatitis     Seizures (HCC)     TIA (transient ischemic attack)      Past Surgical History:   Procedure Laterality Date    APPENDECTOMY      CHOLECYSTECTOMY      INTESTINAL BYPASS      jejunoileal     Family History   Problem Relation Age of Onset    Arthritis Other     Heart Disease Other     High Blood Pressure Other     Kidney Disease Other     Other Other         respiratory problems    Breast Cancer Neg Hx     Colon Cancer Neg Hx     Diabetes Neg Hx     Cancer Neg Hx     Eclampsia Neg Hx     Ovarian Cancer Neg Hx     Hypertension Neg Hx      Labor Neg Hx     Spont Abortions Neg Hx     Stroke Neg Hx      Social History     Socioeconomic History    Marital status:      Spouse name: None    Number of children: None    Years of education: None  Highest education level: None   Occupational History    None   Social Needs    Financial resource strain: None    Food insecurity     Worry: None     Inability: None    Transportation needs     Medical: None     Non-medical: None   Tobacco Use    Smoking status: Current Every Day Smoker     Packs/day: 3.00     Years: 54.00     Pack years: 162.00     Types: Cigarettes    Smokeless tobacco: Never Used   Substance and Sexual Activity    Alcohol use: No     Alcohol/week: 0.0 standard drinks    Drug use: No    Sexual activity: Not Currently     Partners: Male     Comment:    Lifestyle    Physical activity     Days per week: None     Minutes per session: None    Stress: None   Relationships    Social connections     Talks on phone: None     Gets together: None     Attends Yazidi service: None     Active member of club or organization: None     Attends meetings of clubs or organizations: None     Relationship status: None    Intimate partner violence     Fear of current or ex partner: None     Emotionally abused: None     Physically abused: None     Forced sexual activity: None   Other Topics Concern    None   Social History Narrative    None     Allergies   Allergen Reactions    Aspirin      Bothers stomach    Penicillins      Unable to take    Adhesive Tape Rash     Patient states Silk Tape works the best       Review of Systems:   General ROS: grief  Respiratory ROS: improved SOB  Cardiovascular ROS: no chest pain or dyspnea on exertion  Gastrointestinal ROS: hx of ileojejunal bypass  Genito-Urinary ROS: no dysuria, trouble voiding  Musculoskeletal ROS: some pain in her lower legs  Neurological ROS: negative for - behavioral changes, memory loss, numbness/tingling, tremors or weakness    In general patient otherwise reports feeling well.      Physical Exam:  /70 (Site: Left Upper Arm)   Pulse 85   Temp 97.5 °F (36.4 °C)   Ht 5' 2\" (1.575 m)   Wt 95 lb (43.1 kg)   SpO2 97%

## 2020-08-24 ENCOUNTER — TELEPHONE (OUTPATIENT)
Dept: FAMILY MEDICINE CLINIC | Age: 75
End: 2020-08-24

## 2020-08-24 RX ORDER — MIRTAZAPINE 15 MG/1
TABLET, FILM COATED ORAL
Qty: 30 TABLET | Refills: 3 | Status: SHIPPED | OUTPATIENT
Start: 2020-08-24 | End: 2020-12-24 | Stop reason: SDUPTHER

## 2020-08-24 NOTE — TELEPHONE ENCOUNTER
lov 8/21/2020    Spoke with son since I noticed pt reported not taking 8/21/2020  Son states she is taking that medication still & thought he just picked it up for her. ..

## 2020-08-25 ENCOUNTER — TELEPHONE (OUTPATIENT)
Dept: FAMILY MEDICINE CLINIC | Age: 75
End: 2020-08-25

## 2020-09-01 NOTE — TELEPHONE ENCOUNTER
Patient son calling regarding transport chair. States they would [facility] Fax: [phone]  Ph. [phone]    Can this sent please. Thank you.

## 2020-09-17 RX ORDER — CLONAZEPAM 0.5 MG/1
0.5 TABLET ORAL NIGHTLY PRN
Qty: 30 TABLET | Refills: 2 | Status: SHIPPED | OUTPATIENT
Start: 2020-09-17 | End: 2020-12-21

## 2020-10-10 ENCOUNTER — TELEPHONE (OUTPATIENT)
Dept: FAMILY MEDICINE CLINIC | Age: 75
End: 2020-10-10

## 2020-10-10 NOTE — TELEPHONE ENCOUNTER
Spoke to pt's son about pt scheduling her active mammogram. He will talk to pt, but doesn't believe that she will do it.

## 2020-10-19 RX ORDER — CLOPIDOGREL BISULFATE 75 MG/1
TABLET ORAL
Qty: 90 TABLET | Refills: 3 | Status: SHIPPED | OUTPATIENT
Start: 2020-10-19 | End: 2021-08-24 | Stop reason: SDUPTHER

## 2020-10-19 RX ORDER — PROPRANOLOL HYDROCHLORIDE 40 MG/1
TABLET ORAL
Qty: 180 TABLET | Refills: 3 | Status: SHIPPED | OUTPATIENT
Start: 2020-10-19 | End: 2021-08-24 | Stop reason: SDUPTHER

## 2020-10-19 RX ORDER — PANCRELIPASE 24000; 76000; 120000 [USP'U]/1; [USP'U]/1; [USP'U]/1
CAPSULE, DELAYED RELEASE PELLETS ORAL
Qty: 180 CAPSULE | Refills: 3 | Status: SHIPPED | OUTPATIENT
Start: 2020-10-19 | End: 2021-08-24 | Stop reason: SDUPTHER

## 2020-10-19 RX ORDER — SERTRALINE HYDROCHLORIDE 25 MG/1
TABLET, FILM COATED ORAL
Qty: 90 TABLET | Refills: 3 | Status: SHIPPED | OUTPATIENT
Start: 2020-10-19 | End: 2020-10-21 | Stop reason: ALTCHOICE

## 2020-10-19 RX ORDER — PANTOPRAZOLE SODIUM 40 MG/1
TABLET, DELAYED RELEASE ORAL
Qty: 90 TABLET | Refills: 3 | Status: SHIPPED | OUTPATIENT
Start: 2020-10-19 | End: 2021-08-24 | Stop reason: SDUPTHER

## 2020-10-21 ENCOUNTER — OFFICE VISIT (OUTPATIENT)
Dept: FAMILY MEDICINE CLINIC | Age: 75
End: 2020-10-21
Payer: MEDICARE

## 2020-10-21 VITALS
TEMPERATURE: 97.3 F | SYSTOLIC BLOOD PRESSURE: 132 MMHG | BODY MASS INDEX: 17.48 KG/M2 | HEART RATE: 78 BPM | OXYGEN SATURATION: 98 % | WEIGHT: 95 LBS | HEIGHT: 62 IN | DIASTOLIC BLOOD PRESSURE: 74 MMHG

## 2020-10-21 PROCEDURE — 90732 PPSV23 VACC 2 YRS+ SUBQ/IM: CPT | Performed by: FAMILY MEDICINE

## 2020-10-21 PROCEDURE — G0009 ADMIN PNEUMOCOCCAL VACCINE: HCPCS | Performed by: FAMILY MEDICINE

## 2020-10-21 PROCEDURE — 99213 OFFICE O/P EST LOW 20 MIN: CPT | Performed by: FAMILY MEDICINE

## 2020-10-21 PROCEDURE — G0008 ADMIN INFLUENZA VIRUS VAC: HCPCS | Performed by: FAMILY MEDICINE

## 2020-10-21 PROCEDURE — 90694 VACC AIIV4 NO PRSRV 0.5ML IM: CPT | Performed by: FAMILY MEDICINE

## 2020-10-21 PROCEDURE — 96372 THER/PROPH/DIAG INJ SC/IM: CPT | Performed by: FAMILY MEDICINE

## 2020-10-21 RX ORDER — CYANOCOBALAMIN 1000 UG/ML
1000 INJECTION INTRAMUSCULAR; SUBCUTANEOUS ONCE
Status: COMPLETED | OUTPATIENT
Start: 2020-10-21 | End: 2020-10-21

## 2020-10-21 RX ADMIN — CYANOCOBALAMIN 1000 MCG: 1000 INJECTION INTRAMUSCULAR; SUBCUTANEOUS at 15:12

## 2020-10-21 NOTE — PROGRESS NOTES
Chief Complaint   Patient presents with    3 Month Follow-Up     depression, COPD    Health Maintenance     refuse mammo & colonoscopy    Immunizations     flu & pneum, asking for B12       HPI:  Elias Steele is a 77 y.o. female     Follow up  Last here 2 months ago    Requesting immunizations and b12 shot    No falls  Feels a little off when walking, but is using walker    Reports some soreness in right upper leg    Wt Readings from Last 3 Encounters:   10/21/20 95 lb (43.1 kg)   08/21/20 95 lb (43.1 kg)   06/30/20 91 lb 12.8 oz (41.6 kg)         Compliant with meds for HTN    GERD, on protonix  COPD, uses albuterol    plavix for reported hx of TIA    She had ileojejunal bypass in 1978  She drinks ensure        Wt Readings from Last 3 Encounters:   10/21/20 95 lb (43.1 kg)   08/21/20 95 lb (43.1 kg)   06/30/20 91 lb 12.8 oz (41.6 kg)         Patient Active Problem List   Diagnosis    Hypertensive disorder    Venous tributary (branch) occlusion of retina    Cortical senile cataract    Macular degeneration, age related, nonexudative    Nuclear senile cataract    Gastroesophageal reflux disease    Chronic obstructive pulmonary disease (HCC)    History of disease    Cobalamin deficiency    Anxiety    Chronic obstructive lung disease ([facility] [address]    COPD exacerbation (HCC)    Postmenopausal bleeding       Current Outpatient Medications   Medication Sig Dispense Refill    propranolol (INDERAL) 40 MG tablet TAKE 1 TABLET TWICE A DAY (REPLACES PREVIOUS PRESCRIPTION) 180 tablet 3    pantoprazole (PROTONIX) 40 MG tablet TAKE 1 TABLET DAILY 90 tablet 3    CREON 62818-33222 units delayed release capsule TAKE 1 CAPSULE TWICE A  capsule 3    clopidogrel (PLAVIX) 75 MG tablet TAKE 1 TABLET DAILY 90 tablet 3    clonazePAM (KLONOPIN) 0.5 MG tablet Take 1 tablet by mouth nightly as needed for Anxiety for up to 90 days.  30 tablet 2    mirtazapine (REMERON) 15 MG tablet TAKE 1 TABLET BY MOUTH EVERY NIGHT 30 tablet 3    Tiotropium Bromide-Olodaterol 2.5-2.5 MCG/ACT AERS Inhale 2 puffs into the lungs daily 3 Inhaler 1    albuterol (PROVENTIL) (2.5 MG/3ML) 0.083% nebulizer solution Take 3 mLs by nebulization every 4 hours as needed for Wheezing 360 each 1    losartan (COZAAR) 50 MG tablet TAKE 1 TABLET BY MOUTH DAILY 90 tablet 1    Multiple Vitamins-Minerals (THERAPEUTIC MULTIVITAMIN-MINERALS) tablet Take 1 tablet by mouth daily 90 tablet 3    albuterol sulfate HFA (VENTOLIN HFA) 108 (90 Base) MCG/ACT inhaler Inhale 2 puffs into the lungs every 6 hours as needed for Wheezing 3 Inhaler 3    Magnesium Oxide 400 MG CAPS Take 400 mg by mouth 3 times daily 270 capsule 3    clotrimazole-betamethasone (LOTRISONE) 1-0.05 % cream Apply topically 2 times daily.  45 g 5    donepezil (ARICEPT) 5 MG tablet Take 1 tablet by mouth nightly (Patient not taking: Reported on 10/21/2020) 30 tablet 5     Current Facility-Administered Medications   Medication Dose Route Frequency Provider Last Rate Last Dose    cyanocobalamin injection 1,000 mcg  1,000 mcg Intramuscular Once Gareth Bee MD             Past Medical History:   Diagnosis Date    Bowel disease     Cataract     Chronic back pain     COPD (chronic obstructive pulmonary disease) (HCC)     Dementia (HCC)     Dizziness and giddiness     GERD (gastroesophageal reflux disease)     Glaucoma     Hypertension     Osteoarthritis     Pancreatitis     Seizures (HCC)     TIA (transient ischemic attack)      Past Surgical History:   Procedure Laterality Date    APPENDECTOMY      CHOLECYSTECTOMY      INTESTINAL BYPASS      jejunoileal     Family History   Problem Relation Age of Onset    Arthritis Other     Heart Disease Other     High Blood Pressure Other     Kidney Disease Other     Other Other         respiratory problems    Breast Cancer Neg Hx     Colon Cancer Neg Hx     Diabetes Neg Hx     Cancer Neg Hx     Eclampsia Neg Hx     Ovarian Cancer Neg Hx     Hypertension Neg Hx      Labor Neg Hx     Spont Abortions Neg Hx     Stroke Neg Hx      Social History     Socioeconomic History    Marital status:      Spouse name: None    Number of children: None    Years of education: None    Highest education level: None   Occupational History    None   Social Needs    Financial resource strain: None    Food insecurity     Worry: None     Inability: None    Transportation needs     Medical: None     Non-medical: None   Tobacco Use    Smoking status: Current Every Day Smoker     Packs/day: 3.00     Years: 54.00     Pack years: 162.00     Types: Cigarettes    Smokeless tobacco: Never Used   Substance and Sexual Activity    Alcohol use: No     Alcohol/week: 0.0 standard drinks    Drug use: No    Sexual activity: Not Currently     Partners: Male     Comment:    Lifestyle    Physical activity     Days per week: None     Minutes per session: None    Stress: None   Relationships    Social connections     Talks on phone: None     Gets together: None     Attends Cheondoism service: None     Active member of club or organization: None     Attends meetings of clubs or organizations: None     Relationship status: None    Intimate partner violence     Fear of current or ex partner: None     Emotionally abused: None     Physically abused: None     Forced sexual activity: None   Other Topics Concern    None   Social History Narrative    None     Allergies   Allergen Reactions    Aspirin      Bothers stomach    Penicillins      Unable to take    Adhesive Tape Rash     Patient states Silk Tape works the best       Review of Systems:   General ROS: grief  Respiratory ROS: improved SOB  Cardiovascular ROS: no chest pain or dyspnea on exertion  Gastrointestinal ROS: hx of ileojejunal bypass  Genito-Urinary ROS: no dysuria, trouble voiding  Musculoskeletal ROS: some pain in her lower legs  Neurological ROS: negative for - behavioral changes, memory loss, numbness/tingling, tremors or weakness    In general patient otherwise reports feeling well. Physical Exam:  /74   Pulse 78   Temp 97.3 °F (36.3 °C) (Infrared)   Ht 5' 2\" (1.575 m)   Wt 95 lb (43.1 kg)   LMP  (LMP Unknown)   SpO2 98%   Breastfeeding No   BMI 17.38 kg/m²     Gen: Well, NAD, Alert, Oriented x 3, her history giving is a little circumstantial  HEENT: EOMI, eyes clear, MMM  Skin: no rash  Neck: no significant lymphadenopathy or thyromegaly  Lungs: exp wheeze   Heart: RRR, S1S2, w/out M/R/G, non-displaced PMI   Ext:trace pedal edema  Psych: she appears euthymic  Patient has BUE and LUE strength deficits of 4/5    Lab Results   Component Value Date    WBC 6.7 05/08/2020    HGB 15.3 05/08/2020    HCT 47.2 (H) 05/08/2020     05/08/2020    CHOL 118 12/08/2017    TRIG 235 (H) 12/08/2017    HDL 26 (L) 12/08/2017    ALT 15 03/23/2020    AST 19 03/23/2020     08/21/2020    K 5.5 (H) 08/21/2020     (H) 08/21/2020    CREATININE 1.14 (H) 08/21/2020    BUN 28 (H) 08/21/2020    CO2 21 08/21/2020    TSH 3.840 05/08/2020    INR 0.9 11/15/2019         A&P   Diagnosis Orders   1. B12 deficiency  cyanocobalamin injection 1,000 mcg   2. Essential hypertension     3. Depression, unspecified depression type     4. Hypomagnesemia     5. Hyperkalemia     6. Dementia with behavioral disturbance, unspecified dementia type ([facility] [address]     7. Gastroesophageal reflux disease, unspecified whether esophagitis present     8. Needs flu shot  INFLUENZA, QUADV, ADJUVANTED, 65 YRS =, IM, PF, PREFILL SYR, 0.5ML (FLUAD)   9.  Need for prophylactic vaccination against Streptococcus pneumoniae (pneumococcus)  PNEUMOVAX 23 subcutaneous/IM (Pneumococcal polysaccharide vaccine 23-valent >= 1yo)       Refuses screens    remeron to continue    aricept  Klonopin  Continue these      Wt Readings from Last 3 Encounters:   10/21/20 95 lb (43.1 kg)   08/21/20 95 lb (43.1 kg)   06/30/20 91 lb 12.8 oz (41.6 kg)

## 2020-10-21 NOTE — Clinical Note
Need your help on this.   A script for transport chair was sent to [facility] in September  Patient has not heard anything about whether they approved or will give it to her

## 2020-10-21 NOTE — PROGRESS NOTES
After obtaining consent, and per orders of Dr. Morteza Mars, injection of pneumovax 23 was given in the  Right deltoid, HD flu was given in the LT deltoid, and B12 was given in the RT gluteal  by [facility]. Patient instructed to remain in clinic for 20 minutes afterwards, and to report any adverse reaction to me immediately. Tolerated well.

## 2020-10-22 ENCOUNTER — OFFICE VISIT (OUTPATIENT)
Dept: PULMONOLOGY | Age: 75
End: 2020-10-22
Payer: MEDICARE

## 2020-10-22 VITALS
HEIGHT: 62 IN | BODY MASS INDEX: 17.48 KG/M2 | TEMPERATURE: 97.1 F | HEART RATE: 86 BPM | SYSTOLIC BLOOD PRESSURE: 142 MMHG | DIASTOLIC BLOOD PRESSURE: 80 MMHG | OXYGEN SATURATION: 97 % | RESPIRATION RATE: 16 BRPM | WEIGHT: 95 LBS

## 2020-10-22 PROBLEM — Z72.0 TOBACCO ABUSE: Status: ACTIVE | Noted: 2020-10-22

## 2020-10-22 PROBLEM — R06.02 SHORTNESS OF BREATH: Status: ACTIVE | Noted: 2020-10-22

## 2020-10-22 PROCEDURE — 99214 OFFICE O/P EST MOD 30 MIN: CPT | Performed by: INTERNAL MEDICINE

## 2020-10-22 RX ORDER — CLOTRIMAZOLE AND BETAMETHASONE DIPROPIONATE 10; .64 MG/G; MG/G
CREAM TOPICAL
Qty: 45 G | Refills: 14 | Status: SHIPPED | OUTPATIENT
Start: 2020-10-22 | End: 2021-05-24 | Stop reason: SDUPTHER

## 2020-10-22 ASSESSMENT — ENCOUNTER SYMPTOMS
VOICE CHANGE: 0
SHORTNESS OF BREATH: 1
DIARRHEA: 0
WHEEZING: 1
CHEST TIGHTNESS: 0
RHINORRHEA: 0
EYE DISCHARGE: 0
COUGH: 1
ABDOMINAL PAIN: 0
EYE ITCHING: 0
VOMITING: 0
SORE THROAT: 0
TROUBLE SWALLOWING: 0
NAUSEA: 0
SINUS PRESSURE: 0

## 2020-10-22 NOTE — PROGRESS NOTES
Subjective:     Eugenie Dailey is a 77 y.o. female who complains today of:     Chief Complaint   Patient presents with    Follow-up     four month f/u for COPD. HPI  Patient is using albuterol neb and albuterol HFA and stiolto respimat 2 puff in AM   C/o shortness of breath with exertion. On and off Wheezing   C/o Cough with  White Sputum  No Hemoptysis  No Chest tightness   No Chest pain with radiation  or pleuritic pain  No Fever or chills. No Rhinorrhea and postnasal drip.   CXR in  show emphysema no active disease  She is smoking 2 ppd       Allergies:  Aspirin; Penicillins; and Adhesive tape  Past Medical History:   Diagnosis Date    Bowel disease     Cataract     Chronic back pain     COPD (chronic obstructive pulmonary disease) (HCC)     Dementia (HCC)     Dizziness and giddiness     GERD (gastroesophageal reflux disease)     Glaucoma     Hypertension     Osteoarthritis     Pancreatitis     Seizures (HCC)     TIA (transient ischemic attack)      Past Surgical History:   Procedure Laterality Date    APPENDECTOMY      CHOLECYSTECTOMY      INTESTINAL BYPASS      jejunoileal     Family History   Problem Relation Age of Onset    Arthritis Other     Heart Disease Other     High Blood Pressure Other     Kidney Disease Other     Other Other         respiratory problems    Breast Cancer Neg Hx     Colon Cancer Neg Hx     Diabetes Neg Hx     Cancer Neg Hx     Eclampsia Neg Hx     Ovarian Cancer Neg Hx     Hypertension Neg Hx      Labor Neg Hx     Spont Abortions Neg Hx     Stroke Neg Hx      Social History     Socioeconomic History    Marital status:      Spouse name: Not on file    Number of children: Not on file    Years of education: Not on file    Highest education level: Not on file   Occupational History    Not on file   Social Needs    Financial resource strain: Not on file    Food insecurity     Worry: Not on file     Inability: Not on file   Marni Sniderite allergies. Neurological: Negative for dizziness, tremors, weakness and headaches. Psychiatric/Behavioral: Negative for behavioral problems and sleep disturbance.         :     Vitals:    10/22/20 1406 10/22/20 1411   BP: (!) 142/80 (!) 142/80   Pulse: 86    Resp: 16    Temp: 97.1 °F (36.2 °C)    TempSrc: Temporal    SpO2: 97%    Weight: 95 lb (43.1 kg)    Height: 5' 2\" (1.575 m)      Wt Readings from Last 3 Encounters:   10/22/20 95 lb (43.1 kg)   10/21/20 95 lb (43.1 kg)   08/21/20 95 lb (43.1 kg)         Physical Exam  Constitutional:       General: She is not in acute distress. Appearance: She is well-developed. She is not diaphoretic. HENT:      Head: Normocephalic and atraumatic. Nose: Nose normal.   Eyes:      Pupils: Pupils are equal, round, and reactive to light. Neck:      Thyroid: No thyromegaly. Vascular: No JVD. Trachea: No tracheal deviation. Cardiovascular:      Rate and Rhythm: Normal rate and regular rhythm. Heart sounds: No murmur. No friction rub. No gallop. Pulmonary:      Effort: No respiratory distress. Breath sounds: Wheezing present. No rales. Comments: diminished Breath sound bilaterally. Chest:      Chest wall: No tenderness. Abdominal:      General: There is no distension. Tenderness: There is no abdominal tenderness. There is no rebound. Musculoskeletal: Normal range of motion. Lymphadenopathy:      Cervical: No cervical adenopathy. Skin:     General: Skin is warm and dry. Neurological:      Mental Status: She is alert and oriented to person, place, and time.       Coordination: Coordination normal.         Current Outpatient Medications   Medication Sig Dispense Refill    clotrimazole-betamethasone (LOTRISONE) 1-0.05 % cream APPLY TOPICALLY TWICE A DAY 45 g 14    Tiotropium Bromide-Olodaterol 2.5-2.5 MCG/ACT AERS Inhale 2 puffs into the lungs daily 3 Inhaler 1    propranolol (INDERAL) 40 MG tablet TAKE 1 TABLET TWICE A DAY (REPLACES PREVIOUS PRESCRIPTION) 180 tablet 3    pantoprazole (PROTONIX) 40 MG tablet TAKE 1 TABLET DAILY 90 tablet 3    CREON 34647-72196 units delayed release capsule TAKE 1 CAPSULE TWICE A  capsule 3    clopidogrel (PLAVIX) 75 MG tablet TAKE 1 TABLET DAILY 90 tablet 3    clonazePAM (KLONOPIN) 0.5 MG tablet Take 1 tablet by mouth nightly as needed for Anxiety for up to 90 days. 30 tablet 2    mirtazapine (REMERON) 15 MG tablet TAKE 1 TABLET BY MOUTH EVERY NIGHT 30 tablet 3    donepezil (ARICEPT) 5 MG tablet Take 1 tablet by mouth nightly 30 tablet 5    albuterol (PROVENTIL) (2.5 MG/3ML) 0.083% nebulizer solution Take 3 mLs by nebulization every 4 hours as needed for Wheezing 360 each 1    losartan (COZAAR) 50 MG tablet TAKE 1 TABLET BY MOUTH DAILY 90 tablet 1    Multiple Vitamins-Minerals (THERAPEUTIC MULTIVITAMIN-MINERALS) tablet Take 1 tablet by mouth daily 90 tablet 3    albuterol sulfate HFA (VENTOLIN HFA) 108 (90 Base) MCG/ACT inhaler Inhale 2 puffs into the lungs every 6 hours as needed for Wheezing 3 Inhaler 3    Magnesium Oxide 400 MG CAPS Take 400 mg by mouth 3 times daily 270 capsule 3     No current facility-administered medications for this visit. Results for orders placed during the hospital encounter of 05/08/20   XR CHEST STANDARD (2 VW)    Narrative XR CHEST (2 VW)    Exam Date/Time:  5/8/2020 2:12 PM  Clinical History:  J44.1 COPD exacerbation (HCC) ICD10  shorts of breath, difficulty breathing, weight loss, history of COPD. Comparison:  3/23/2020      RESULT:         Lungs and pleura:  Emphysematous changes. No focal consolidation. Biapical pleural-parenchymal scarring. Scarring at the lung bases. No pleural effusion. No pneumothorax. Normal pulmonary vascular pattern. Cardiomediastinal silhouette:  Normal.    Other:  No acute osseous findings. Surgical clip upper abdomen near midline. Impression No acute radiographic abnormality.     Emphysema.   ]  Results for orders placed during the hospital encounter of 03/23/20   XR CHEST PORTABLE    Narrative EXAMINATION: PORTABLE CHEST X-RAY FROM 3/23/2020 AT 17:52 HOURS    CLINICAL HISTORY: SMOKING HISTORY AND INTERMITTENT DYSPNEA X2 WEEKS    COMPARISONS: 5/19/2015    FINDINGS: The heart is not enlarged. There is mild atherosclerotic calcification and tortuosity of the aorta. There is hyperinflation of the lungs suggesting COPD. There is blunting of the right CP angle similar to the prior chest film and this likely   represents pleural scarring in the right lung base. No acute pulmonary infiltrates or pleural effusions. No pneumothorax. There is degenerative bone spurring in the spine. Impression 1. NO RADIOGRAPHIC EVIDENCE OF ACTIVE DISEASE IN THE CHEST. 2. HYPERINFLATION OF THE LUNGS SUGGESTIVE OF COPD.   ]  No results found for this or any previous visit.]  Results for orders placed during the hospital encounter of 03/23/20   CT CHEST WO CONTRAST    Narrative CT of the Chest is contrast medium. History:  55 year smoker. Complains weight loss the. Rule out mass. Technical Factors:    CT imaging of the chest was obtained and formatted as 5 mm contiguous axial images from the thoracic inlet through the adrenal glands. Sagittal coronal reconstruction obtained during postprocessing. Intravenous contrast medium:  None    Comparison:  Chest radiograph March 23, 2020, May 19, 2015. Findings:    Bilateral diffuse emphysematous change. No nodules, and no masses bilaterally. Scant dependent subsegmental atelectatic change posterior right lung base. No pleural effusion. No hilar, mediastinal, or axillary lymph node enlargement. Thoracic aorta normal in course and caliber. Cardiac size normal. No pericardial effusion. Coronary calcification demonstrated. Limited imaging upper abdomen shows no gross anomalies. No osteoblastic, and no osteolytic lesions. Diffuse anterior osteophytes thoracic spine. Impression Emphysema. No lung masses identified. All CT scans at this facility use dose modulation, iterative reconstruction, and/or weight based dosing when appropriate to reduce radiation dose to as low as reasonably achievable.   ]  No results found for this or any previous visit.]    Assessment/Plan:     1. Chronic obstructive pulmonary disease, unspecified COPD type ([facility] [address]  She  is using albuterol neb and albuterol HFA and stiolto respimat 2 puff in AM  .C/o shortness of breath with exertion. On and off Wheezing C/o Cough with  White Sputum  CXR in 5/20 show emphysema no active disease. Chest x-ray reviewed with patient    - Tiotropium Bromide-Olodaterol 2.5-2.5 MCG/ACT AERS; Inhale 2 puffs into the lungs daily  Dispense: 3 Inhaler; Refill: 1    2. Shortness of breath  Patient is having  Chronic shortness of breath which is likely due to COPD, continue  Bronchodilator,as before. keep  Spo2 90% or above. 3. Tobacco abuse  Patient advised try to quit smoking. Risks related to smoking explained to the patient. Different ways to help him quit smoking were discussed today. She is still  smoking 2 ppd       Return in about 3 months (around 1/22/2021) for COPD.       Marvin Guillaume MD

## 2020-10-31 ENCOUNTER — TELEPHONE (OUTPATIENT)
Dept: FAMILY MEDICINE CLINIC | Age: 75
End: 2020-10-31

## 2020-11-19 RX ORDER — LOSARTAN POTASSIUM 50 MG/1
50 TABLET ORAL DAILY
Qty: 90 TABLET | Refills: 1 | Status: SHIPPED | OUTPATIENT
Start: 2020-11-19 | End: 2021-05-20 | Stop reason: SDUPTHER

## 2020-11-29 ENCOUNTER — TELEPHONE (OUTPATIENT)
Dept: FAMILY MEDICINE CLINIC | Age: 75
End: 2020-11-29

## 2020-11-30 ENCOUNTER — TELEPHONE (OUTPATIENT)
Dept: FAMILY MEDICINE CLINIC | Age: 75
End: 2020-11-30

## 2020-11-30 NOTE — TELEPHONE ENCOUNTER
Lisbeth Lazaro from [employer] calling to report that she received a fax for a transport chair, they do not handle those request. Please advise. Tomasa's phone number is [phone].

## 2020-12-21 RX ORDER — CLONAZEPAM 0.5 MG/1
TABLET ORAL
Qty: 30 TABLET | Refills: 2 | Status: SHIPPED | OUTPATIENT
Start: 2020-12-21 | End: 2021-03-29 | Stop reason: SDUPTHER

## 2020-12-24 RX ORDER — MIRTAZAPINE 15 MG/1
TABLET, FILM COATED ORAL
Qty: 30 TABLET | Refills: 3 | Status: SHIPPED | OUTPATIENT
Start: 2020-12-24 | End: 2021-04-26 | Stop reason: SDUPTHER

## 2021-01-25 DIAGNOSIS — F03.91 DEMENTIA WITH BEHAVIORAL DISTURBANCE, UNSPECIFIED DEMENTIA TYPE: ICD-10-CM

## 2021-01-25 RX ORDER — DONEPEZIL HYDROCHLORIDE 5 MG/1
5 TABLET, FILM COATED ORAL NIGHTLY
Qty: 30 TABLET | Refills: 5 | Status: SHIPPED | OUTPATIENT
Start: 2021-01-25 | End: 2021-02-22

## 2021-01-27 ENCOUNTER — OFFICE VISIT (OUTPATIENT)
Dept: PULMONOLOGY | Age: 76
End: 2021-01-27
Payer: MEDICARE

## 2021-01-27 VITALS
DIASTOLIC BLOOD PRESSURE: 90 MMHG | OXYGEN SATURATION: 97 % | HEIGHT: 62 IN | SYSTOLIC BLOOD PRESSURE: 144 MMHG | WEIGHT: 95 LBS | HEART RATE: 86 BPM | BODY MASS INDEX: 17.48 KG/M2 | TEMPERATURE: 97 F | RESPIRATION RATE: 16 BRPM

## 2021-01-27 DIAGNOSIS — J44.9 CHRONIC OBSTRUCTIVE PULMONARY DISEASE, UNSPECIFIED COPD TYPE (HCC): Primary | ICD-10-CM

## 2021-01-27 DIAGNOSIS — R06.02 SHORTNESS OF BREATH: ICD-10-CM

## 2021-01-27 DIAGNOSIS — Z72.0 TOBACCO ABUSE: ICD-10-CM

## 2021-01-27 PROCEDURE — 99214 OFFICE O/P EST MOD 30 MIN: CPT | Performed by: INTERNAL MEDICINE

## 2021-01-27 ASSESSMENT — ENCOUNTER SYMPTOMS
EYE DISCHARGE: 0
COUGH: 1
SORE THROAT: 0
WHEEZING: 1
ABDOMINAL PAIN: 0
SHORTNESS OF BREATH: 1
SINUS PRESSURE: 0
RHINORRHEA: 0
NAUSEA: 0
CHEST TIGHTNESS: 0
TROUBLE SWALLOWING: 0
VOMITING: 0
DIARRHEA: 0
VOICE CHANGE: 0
EYE ITCHING: 0

## 2021-01-27 NOTE — PROGRESS NOTES
on file   [employer] needs     Medical: Not on file     Non-medical: Not on file   Tobacco Use    Smoking status: Current Every Day Smoker     Packs/day: 3.00     Years: 54.00     Pack years: 162.00     Types: Cigarettes    Smokeless tobacco: Never Used   Substance and Sexual Activity    Alcohol use: No     Alcohol/week: 0.0 standard drinks    Drug use: No    Sexual activity: Not Currently     Partners: Male     Comment:    Lifestyle    Physical activity     Days per week: Not on file     Minutes per session: Not on file    Stress: Not on file   Relationships    Social connections     Talks on phone: Not on file     Gets together: Not on file     Attends L[employer] service: Not on file     Active member of club or organization: Not on file     Attends meetings of clubs or organizations: Not on file     Relationship status: Not on file    Intimate partner violence     Fear of current or ex partner: Not on file     Emotionally abused: Not on file     Physically abused: Not on file     Forced sexual activity: Not on file   Other Topics Concern    Not on file   Social History Narrative    Not on file         Review of Systems   Constitutional: Negative for chills, diaphoresis, fatigue and fever. HENT: Negative for congestion, mouth sores, nosebleeds, postnasal drip, rhinorrhea, sinus pressure, sneezing, sore throat, trouble swallowing and voice change. Eyes: Negative for discharge, itching and visual disturbance. Respiratory: Positive for cough, shortness of breath and wheezing. Negative for chest tightness. Cardiovascular: Negative for chest pain, palpitations and leg swelling. Gastrointestinal: Negative for abdominal pain, diarrhea, nausea and vomiting. Genitourinary: Negative for difficulty urinating and hematuria. Musculoskeletal: Negative for arthralgias, joint swelling and myalgias. Skin: Negative for rash.    Allergic/Immunologic: Negative for environmental allergies and food allergies. Neurological: Negative for dizziness, tremors, weakness and headaches. Psychiatric/Behavioral: Negative for behavioral problems and sleep disturbance.         :     Vitals:    01/27/21 1402   BP: (!) 144/90   Pulse: 86   Resp: 16   Temp: 97 °F (36.1 °C)   TempSrc: Temporal   SpO2: 97%   Weight: 95 lb (43.1 kg)   Height: 5' 2\" (1.575 m)     Wt Readings from Last 3 Encounters:   01/27/21 95 lb (43.1 kg)   10/22/20 95 lb (43.1 kg)   10/21/20 95 lb (43.1 kg)         Physical Exam  Constitutional:       General: She is not in acute distress. Appearance: She is well-developed. She is not diaphoretic. HENT:      Head: Normocephalic and atraumatic. Nose: Nose normal.   Eyes:      Pupils: Pupils are equal, round, and reactive to light. Neck:      Thyroid: No thyromegaly. Vascular: No JVD. Trachea: No tracheal deviation. Cardiovascular:      Rate and Rhythm: Normal rate and regular rhythm. Heart sounds: No murmur. No friction rub. No gallop. Pulmonary:      Effort: No respiratory distress. Breath sounds: No wheezing or rales. Chest:      Chest wall: No tenderness. Abdominal:      General: There is no distension. Tenderness: There is no abdominal tenderness. There is no rebound. Musculoskeletal: Normal range of motion. Lymphadenopathy:      Cervical: No cervical adenopathy. Skin:     General: Skin is warm and dry. Neurological:      Mental Status: She is alert and oriented to person, place, and time.       Coordination: Coordination normal.         Current Outpatient Medications   Medication Sig Dispense Refill    donepezil (ARICEPT) 5 MG tablet Take 1 tablet by mouth nightly 30 tablet 5    mirtazapine (REMERON) 15 MG tablet TAKE 1 TABLET BY MOUTH EVERY NIGHT 30 tablet 3    clonazePAM (KLONOPIN) 0.5 MG tablet TAKE 1 TABLET NIGHTLY AS NEEDED FOR ANXIETY 30 tablet 2    losartan (COZAAR) 50 MG tablet Take 1 tablet by mouth daily 90 tablet 1    clotrimazole-betamethasone (LOTRISONE) 1-0.05 % cream APPLY TOPICALLY TWICE A DAY 45 g 14    Tiotropium Bromide-Olodaterol 2.5-2.5 MCG/ACT AERS Inhale 2 puffs into the lungs daily 3 Inhaler 1    propranolol (INDERAL) 40 MG tablet TAKE 1 TABLET TWICE A DAY (REPLACES PREVIOUS PRESCRIPTION) 180 tablet 3    pantoprazole (PROTONIX) 40 MG tablet TAKE 1 TABLET DAILY 90 tablet 3    CREON [id number] units delayed release capsule TAKE 1 CAPSULE TWICE A  capsule 3    clopidogrel (PLAVIX) 75 MG tablet TAKE 1 TABLET DAILY 90 tablet 3    albuterol (PROVENTIL) (2.5 MG/3ML) 0.083% nebulizer solution Take 3 mLs by nebulization every 4 hours as needed for Wheezing 360 each 1    Multiple Vitamins-Minerals (THERAPEUTIC MULTIVITAMIN-MINERALS) tablet Take 1 tablet by mouth daily 90 tablet 3    albuterol sulfate HFA (VENTOLIN HFA) 108 (90 Base) MCG/ACT inhaler Inhale 2 puffs into the lungs every 6 hours as needed for Wheezing 3 Inhaler 3    Magnesium Oxide 400 MG CAPS Take 400 mg by mouth 3 times daily 270 capsule 3     No current facility-administered medications for this visit. Results for orders placed during the hospital encounter of 05/08/20   XR CHEST STANDARD (2 VW)    Narrative XR CHEST (2 VW)    Exam Date/Time:  5/8/2020 2:12 PM  Clinical History:  J44.1 COPD exacerbation (HCC) ICD10  shorts of breath, difficulty breathing, weight loss, history of COPD. Comparison:  3/23/2020      RESULT:         Lungs and pleura:  Emphysematous changes. No focal consolidation. Biapical pleural-parenchymal scarring. Scarring at the lung bases. No pleural effusion. No pneumothorax. Normal pulmonary vascular pattern. Cardiomediastinal silhouette:  Normal.    Other:  No acute osseous findings. Surgical clip upper abdomen near midline. Impression No acute radiographic abnormality.     Emphysema.   ]  Results for orders placed during the hospital encounter of 03/23/20   XR CHEST PORTABLE    Narrative EXAMINATION: PORTABLE CHEST X-RAY FROM 3/23/2020 AT 17:52 HOURS    CLINICAL HISTORY: SMOKING HISTORY AND INTERMITTENT DYSPNEA X2 WEEKS    COMPARISONS: 5/19/2015    FINDINGS: The heart is not enlarged. There is mild atherosclerotic calcification and tortuosity of the aorta. There is hyperinflation of the lungs suggesting COPD. There is blunting of the right CP angle similar to the prior chest film and this likely   represents pleural scarring in the right lung base. No acute pulmonary infiltrates or pleural effusions. No pneumothorax. There is degenerative bone spurring in the spine. Impression 1. NO RADIOGRAPHIC EVIDENCE OF ACTIVE DISEASE IN THE CHEST. 2. HYPERINFLATION OF THE LUNGS SUGGESTIVE OF COPD.   ]  No results found for this or any previous visit.]  Results for orders placed during the hospital encounter of 03/23/20   CT CHEST WO CONTRAST    Narrative CT of the Chest is contrast medium. History:  55 year smoker. Complains weight loss the. Rule out mass. Technical Factors:    CT imaging of the chest was obtained and formatted as 5 mm contiguous axial images from the thoracic inlet through the adrenal glands. Sagittal coronal reconstruction obtained during postprocessing. Intravenous contrast medium:  None    Comparison:  Chest radiograph March 23, 2020, May 19, 2015. Findings:    Bilateral diffuse emphysematous change. No nodules, and no masses bilaterally. Scant dependent subsegmental atelectatic change posterior right lung base. No pleural effusion. No hilar, mediastinal, or axillary lymph node enlargement. Thoracic aorta normal in course and caliber. Cardiac size normal. No pericardial effusion. Coronary calcification demonstrated. Limited imaging upper abdomen shows no gross anomalies. No osteoblastic, and no osteolytic lesions. Diffuse anterior osteophytes thoracic spine. Impression Emphysema. No lung masses identified.       All CT scans at this facility use dose modulation, iterative reconstruction, and/or weight based dosing when appropriate to reduce radiation dose to as low as reasonably achievable. Assessment/Plan:     1. Chronic obstructive pulmonary disease, unspecified COPD type ([address]  C/o shortness of breath , worse with exertion. Occasional Wheezing . C/o Cough with clear  Sputum  She is using bronchodilator with albuterol neb and albuterol HFA and stiolto respimat 2 puff in AM   She is smoking 2 ppd. CXR in 5/20 show emphysema. Continue bronchodilator as before. 2. Shortness of breath  She having  Chronic shortness of breath which is likely due to COPD, continue  Bronchodilator, keep  Spo2 90% or above. 3. Tobacco abuse  She is advised try to quit smoking. Risks related to smoking explained to the patient. Different ways to help to quit smoking were discussed today. She is still smoking 2 ppd      Return in about 3 months (around 4/27/2021) for COPD.       Elvia Sauceda MD

## 2021-02-19 DIAGNOSIS — J44.9 CHRONIC OBSTRUCTIVE PULMONARY DISEASE, UNSPECIFIED COPD TYPE (HCC): ICD-10-CM

## 2021-02-20 RX ORDER — ALBUTEROL SULFATE 90 UG/1
2 AEROSOL, METERED RESPIRATORY (INHALATION) EVERY 6 HOURS PRN
Qty: 3 INHALER | Refills: 3 | Status: SHIPPED | OUTPATIENT
Start: 2021-02-20 | End: 2021-07-21 | Stop reason: SDUPTHER

## 2021-02-21 DIAGNOSIS — Z98.0 INTESTINAL BYPASS OR ANASTOMOSIS STATUS: ICD-10-CM

## 2021-02-22 ENCOUNTER — OFFICE VISIT (OUTPATIENT)
Dept: FAMILY MEDICINE CLINIC | Age: 76
End: 2021-02-22
Payer: MEDICARE

## 2021-02-22 VITALS
HEART RATE: 83 BPM | OXYGEN SATURATION: 90 % | TEMPERATURE: 97.7 F | HEIGHT: 62 IN | WEIGHT: 88 LBS | BODY MASS INDEX: 16.2 KG/M2 | SYSTOLIC BLOOD PRESSURE: 102 MMHG | DIASTOLIC BLOOD PRESSURE: 60 MMHG

## 2021-02-22 DIAGNOSIS — F41.9 ANXIETY: ICD-10-CM

## 2021-02-22 DIAGNOSIS — J44.9 CHRONIC OBSTRUCTIVE PULMONARY DISEASE, UNSPECIFIED COPD TYPE (HCC): ICD-10-CM

## 2021-02-22 DIAGNOSIS — F32.A DEPRESSION, UNSPECIFIED DEPRESSION TYPE: ICD-10-CM

## 2021-02-22 DIAGNOSIS — E55.9 VITAMIN D DEFICIENCY: ICD-10-CM

## 2021-02-22 DIAGNOSIS — R63.6 UNDERWEIGHT: ICD-10-CM

## 2021-02-22 DIAGNOSIS — E53.8 B12 DEFICIENCY: Primary | ICD-10-CM

## 2021-02-22 DIAGNOSIS — F03.91 DEMENTIA WITH BEHAVIORAL DISTURBANCE, UNSPECIFIED DEMENTIA TYPE: ICD-10-CM

## 2021-02-22 DIAGNOSIS — K21.9 GASTROESOPHAGEAL REFLUX DISEASE, UNSPECIFIED WHETHER ESOPHAGITIS PRESENT: ICD-10-CM

## 2021-02-22 DIAGNOSIS — I10 ESSENTIAL HYPERTENSION: ICD-10-CM

## 2021-02-22 PROCEDURE — 99214 OFFICE O/P EST MOD 30 MIN: CPT | Performed by: FAMILY MEDICINE

## 2021-02-22 PROCEDURE — 96372 THER/PROPH/DIAG INJ SC/IM: CPT | Performed by: FAMILY MEDICINE

## 2021-02-22 RX ORDER — CYANOCOBALAMIN 1000 UG/ML
1000 INJECTION INTRAMUSCULAR; SUBCUTANEOUS ONCE
Status: COMPLETED | OUTPATIENT
Start: 2021-02-22 | End: 2021-02-22

## 2021-02-22 RX ORDER — CLONAZEPAM 0.5 MG/1
TABLET ORAL
Qty: 30 TABLET | Refills: 2 | Status: CANCELLED | OUTPATIENT
Start: 2021-02-22 | End: 2021-05-23

## 2021-02-22 RX ORDER — METRONIDAZOLE 500 MG/1
TABLET ORAL
Qty: 90 TABLET | Refills: 3 | Status: SHIPPED | OUTPATIENT
Start: 2021-02-22 | End: 2022-04-26

## 2021-02-22 RX ADMIN — CYANOCOBALAMIN 1000 MCG: 1000 INJECTION INTRAMUSCULAR; SUBCUTANEOUS at 14:57

## 2021-02-22 ASSESSMENT — PATIENT HEALTH QUESTIONNAIRE - PHQ9
SUM OF ALL RESPONSES TO PHQ9 QUESTIONS 1 & 2: 2
SUM OF ALL RESPONSES TO PHQ QUESTIONS 1-9: 2

## 2021-02-22 NOTE — PROGRESS NOTES
Chief Complaint   Patient presents with    Hypertension     4 month     Health Maintenance     declined mamm and colon    Ear Injury     atill have knots behind ear    Leg Pain     left is worse, leg amd amkle pain,tried voltaren with some reflief       HPI:  Ozzie Son is a 77 y.o. female     Follow up  4 months    Requesting immunizations and b12 shot    No falls  Feels a little off when walking, but is using walker    Ongoing leg pain/soreness    Weight down further  Eating, but not much     Wt Readings from Last 3 Encounters:   02/22/21 88 lb (39.9 kg)   01/27/21 95 lb (43.1 kg)   10/22/20 95 lb (43.1 kg)     Declining mamm and colonoscopy    Compliant with meds for HTN    GERD, on protonix  COPD, uses albuterol    plavix for reported hx of TIA    She had ileojejunal bypass in 1978    Not drinking ensure         Wt Readings from Last 3 Encounters:   02/22/21 88 lb (39.9 kg)   01/27/21 95 lb (43.1 kg)   10/22/20 95 lb (43.1 kg)         Patient Active Problem List   Diagnosis    Hypertensive disorder    Venous tributary (branch) occlusion of retina    Cortical senile cataract    Macular degeneration, age related, nonexudative    Nuclear senile cataract    Gastroesophageal reflux disease    History of disease    Cobalamin deficiency    Anxiety    Chronic obstructive lung disease ([facility] [address]    COPD exacerbation (HCC)    Postmenopausal bleeding    Shortness of breath    Tobacco abuse       Current Outpatient Medications   Medication Sig Dispense Refill    albuterol sulfate HFA (VENTOLIN HFA) 108 (90 Base) MCG/ACT inhaler Inhale 2 puffs into the lungs every 6 hours as needed for Wheezing 3 Inhaler 3    Tiotropium Bromide-Olodaterol 2.5-2.5 MCG/ACT AERS Inhale 2 puffs into the lungs daily 3 Inhaler 1    mirtazapine (REMERON) 15 MG tablet TAKE 1 TABLET BY MOUTH EVERY NIGHT 30 tablet 3    clonazePAM (KLONOPIN) 0.5 MG tablet TAKE 1 TABLET NIGHTLY AS NEEDED FOR ANXIETY 30 tablet 2    losartan (COZAAR) 50 MG tablet Take 1 tablet by mouth daily 90 tablet 1    clotrimazole-betamethasone (LOTRISONE) 1-0.05 % cream APPLY TOPICALLY TWICE A DAY 45 g 14    propranolol (INDERAL) 40 MG tablet TAKE 1 TABLET TWICE A DAY (REPLACES PREVIOUS PRESCRIPTION) 180 tablet 3    pantoprazole (PROTONIX) 40 MG tablet TAKE 1 TABLET DAILY 90 tablet 3    CREON 32400-50203 units delayed release capsule TAKE 1 CAPSULE TWICE A  capsule 3    clopidogrel (PLAVIX) 75 MG tablet TAKE 1 TABLET DAILY 90 tablet 3    albuterol (PROVENTIL) (2.5 MG/3ML) 0.083% nebulizer solution Take 3 mLs by nebulization every 4 hours as needed for Wheezing 360 each 1    Multiple Vitamins-Minerals (THERAPEUTIC MULTIVITAMIN-MINERALS) tablet Take 1 tablet by mouth daily 90 tablet 3    Magnesium Oxide 400 MG CAPS Take 400 mg by mouth 3 times daily 270 capsule 3     Current Facility-Administered Medications   Medication Dose Route Frequency Provider Last Rate Last Admin    cyanocobalamin injection 1,000 mcg  1,000 mcg Intramuscular Once Johnathan Carolina MD             Past Medical History:   Diagnosis Date    Bowel disease     Cataract     Chronic back pain     COPD (chronic obstructive pulmonary disease) (HCC)     Dementia (HCC)     Dizziness and giddiness     GERD (gastroesophageal reflux disease)     Glaucoma     Hypertension     Osteoarthritis     Pancreatitis     Seizures (HCC)     TIA (transient ischemic attack)      Past Surgical History:   Procedure Laterality Date    APPENDECTOMY      CHOLECYSTECTOMY      INTESTINAL BYPASS      jejunoileal     Family History   Problem Relation Age of Onset    Arthritis Other     Heart Disease Other     High Blood Pressure Other     Kidney Disease Other     Other Other         respiratory problems    Breast Cancer Neg Hx     Colon Cancer Neg Hx     Diabetes Neg Hx     Cancer Neg Hx     Eclampsia Neg Hx     Ovarian Cancer Neg Hx     Hypertension Neg Hx      Labor Neg Hx     Spont Abortions Neg Hx     Stroke Neg Hx      Social History     Socioeconomic History    Marital status:      Spouse name: None    Number of children: None    Years of education: None    Highest education level: None   Occupational History    None   Social Needs    Financial resource strain: None    Food insecurity     Worry: None     Inability: None    Transportation needs     Medical: None     Non-medical: None   Tobacco Use    Smoking status: Current Every Day Smoker     Packs/day: 3.00     Years: 54.00     Pack years: 162.00     Types: Cigarettes    Smokeless tobacco: Never Used   Substance and Sexual Activity    Alcohol use: No     Alcohol/week: 0.0 standard drinks    Drug use: No    Sexual activity: Not Currently     Partners: Male     Comment:    Lifestyle    Physical activity     Days per week: None     Minutes per session: None    Stress: None   Relationships    Social connections     Talks on phone: None     Gets together: None     Attends Ten[employer] service: None     Active member of club or organization: None     Attends meetings of clubs or organizations: None     Relationship status: None    Intimate partner violence     Fear of current or ex partner: None     Emotionally abused: None     Physically abused: None     Forced sexual activity: None   Other Topics Concern    None   Social History Narrative    None     Allergies   Allergen Reactions    Aspirin      Bothers stomach    Penicillins      Unable to take    Adhesive Tape Rash     Patient states Silk Tape works the best       Review of Systems:   General ROS: grief  Respiratory ROS: improved SOB  Cardiovascular ROS: no chest pain or dyspnea on exertion  Gastrointestinal ROS: hx of ileojejunal bypass  Genito-Urinary ROS: no dysuria, trouble voiding  Musculoskeletal ROS: some pain in her lower legs  Neurological ROS: negative for - behavioral changes, memory loss, numbness/tingling, tremors or weakness    In general patient otherwise reports feeling well. Physical Exam:  /60 (Site: Left Upper Arm)   Pulse 83   Temp 97.7 °F (36.5 °C)   Ht 5' 2\" (1.575 m)   Wt 88 lb (39.9 kg)   LMP  (LMP Unknown)   SpO2 90%   Breastfeeding No   BMI 16.10 kg/m²     Gen: Well, NAD, Alert, Oriented x 3, her history giving is a little circumstantial  HEENT: EOMI, eyes clear, MMM  Skin: no rash  Neck: no significant lymphadenopathy or thyromegaly  Lungs: exp wheeze   Heart: RRR, S1S2, w/out M/R/G, non-displaced PMI   Ext:trace pedal edema  Psych: she appears euthymic  Patient has BUE and LUE strength deficits of 4/5    Lab Results   Component Value Date    WBC 6.7 05/08/2020    HGB 15.3 05/08/2020    HCT 47.2 (H) 05/08/2020     05/08/2020    CHOL 118 12/08/2017    TRIG 235 (H) 12/08/2017    HDL 26 (L) 12/08/2017    ALT 15 03/23/2020    AST 19 03/23/2020     08/21/2020    K 5.5 (H) 08/21/2020     (H) 08/21/2020    CREATININE 1.14 (H) 08/21/2020    BUN 28 (H) 08/21/2020    CO2 21 08/21/2020    TSH 3.840 05/08/2020    INR 0.9 11/15/2019         A&P   Diagnosis Orders   1. B12 deficiency  cyanocobalamin injection 1,000 mcg    CBC    Vitamin B12   2. Chronic obstructive pulmonary disease, unspecified COPD type ([address]     3. Anxiety     4. Dementia with behavioral disturbance, unspecified dementia type ([address]     5. Depression, unspecified depression type  TSH without Reflex   6. Essential hypertension  Comprehensive Metabolic Panel   7. Gastroesophageal reflux disease, unspecified whether esophagitis present     8. Underweight  TSH without Reflex   9.  Vitamin D deficiency  Vitamin D 25 Hydroxy       Refuses screens    Agrees to labwork    remeron to continue    Klonopin not due for refill until next month  Continue these    Not taking aricept    Living with daughter    Wt Readings from Last 3 Encounters:   02/22/21 88 lb (39.9 kg)   01/27/21 95 lb (43.1 kg)   10/22/20 95 lb (43.1 kg)           Patient is only able to use walker for short distances with increased risk of falling  Given hand  and upper and lower extremity weakness, she would benefit from the use of a transport chair within the home and in going from the car to doctor's appointments and other errands.      She is unable to safely use only a cane due to extremity weakness    MD Constantino Puri MDM

## 2021-02-22 NOTE — PROGRESS NOTES
[facility] [address] [TAKE 1 TABLET THREE TIMES A DAY FOR 10 DAYS PER MONTH 90 tablet 4    Oxyquinoline-Sod Lauryl Sulf (TRIMO-SONG) 0.025-0.01 % GEL Place 113.4 g vaginally Twice a Week 113.4 g 3    albuterol sulfate HFA (VENTOLIN HFA) 108 (90 Base) MCG/ACT inhaler Inhale 2 puffs into the lungs every 6 hours as needed for Wheezing 3 Inhaler 3    clopidogrel (PLAVIX) 75 MG tablet Take 1 tablet by mouth daily 90 tablet 3    clotrimazole-betamethasone (LOTRISONE) 1-0.05 % cream Apply topically 2 times daily.  45 g 5    lipase-protease-amylase (CREON) 76500-27380 units delayed release capsule Take 1 capsule by mouth 2 times daily 180 capsule 3    Magnesium Oxide 400 MG CAPS Take 400 mg by mouth 2 times daily 180 capsule 3    pantoprazole (PROTONIX) 40 MG tablet Take 1 tablet by mouth daily 90 tablet 3    mupirocin (BACTROBAN) 2 % ointment MICHAELLE TOPICALLY AA TID FOR 10 DAYS 15 g 0    propranolol (INDERAL) 40 MG tablet Take 1 tablet by mouth 2 times daily 180 tablet 3    sertraline (ZOLOFT) 25 MG tablet Take 1 tablet by mouth daily 90 tablet 3     Current Facility-Administered Medications   Medication Dose Route Frequency Provider Last Rate Last Dose    cyanocobalamin injection 1,000 mcg  1,000 mcg Intramuscular Once Mary Altamirano MD             Past Medical History:   Diagnosis Date    Bowel disease     Cataract     Chronic back pain     COPD (chronic obstructive pulmonary disease) (HCC)     Dizziness and giddiness     GERD (gastroesophageal reflux disease)     Glaucoma     Hypertension     Osteoarthritis     Pancreatitis     Seizures (HCC)     TIA (transient ischemic attack)      Past Surgical History:   Procedure Laterality Date    APPENDECTOMY      CHOLECYSTECTOMY      INTESTINAL BYPASS      jejunoileal     Family History   Problem Relation Age of Onset    Arthritis Other     Heart Disease Other     High Blood Pressure Other     Kidney Disease Other     Other Other         respiratory problems    Breast Cancer bypass  Genito-Urinary ROS: no dysuria, trouble voiding  Musculoskeletal ROS: some pain in her lower legs  Neurological ROS: negative for - behavioral changes, memory loss, numbness/tingling, tremors or weakness    In general patient otherwise reports feeling well. Physical Exam:  /80 (Site: Left Upper Arm)   Pulse 59   Ht 5' 2\" (1.575 m)   Wt 106 lb 12.8 oz (48.4 kg)   SpO2 91%   Breastfeeding No   BMI 19.53 kg/m²     Gen: Well, NAD, Alert, Oriented x 3, her history giving is a little circumstantial  HEENT: EOMI, eyes clear, MMM  Skin: no rash  Neck: no significant lymphadenopathy or thyromegaly  Lungs: CTA B w/out Rales/Wheezes/Rhonchi, Good respiratory effort   Heart: RRR, S1S2, w/out M/R/G, non-displaced PMI   Ext: No C/C/E Bilaterally. Neuro: Neurovascularly intact w/ Sensory/Motor intact UE/LE Bilaterally. Psych: she appears euthymic    Lab Results   Component Value Date    WBC 7.2 11/15/2019    HGB 13.9 11/15/2019    HCT 40.8 11/15/2019     11/15/2019    CHOL 118 12/08/2017    TRIG 235 (H) 12/08/2017    HDL 26 (L) 12/08/2017    ALT 14 04/11/2019    AST 18 04/11/2019     (H) 04/11/2019    K 4.8 04/11/2019     04/11/2019    CREATININE 0.96 (H) 04/11/2019    BUN 24 (H) 04/11/2019    CO2 23 04/11/2019    TSH 1.710 11/15/2019    INR 0.9 11/15/2019         A&P   Diagnosis Orders   1. Anxiety  clonazePAM (KLONOPIN) 0.5 MG tablet   2. Colon cancer screening  [address] Colonoscopy   3. B12 deficiency  cyanocobalamin injection 1,000 mcg   4. Transient cerebral ischemia, unspecified type     5. Chronic obstructive pulmonary disease, unspecified COPD type ([facility] [address]     6.  Essential hypertension         bp much better    Pt had stopped hyzaar  Needs back on    aricept  Klonopin  Continue these        Wt Readings from Last 3 Encounters:   02/28/20 106 lb 12.8 oz (48.4 kg)   02/14/20 111 lb (50.3 kg)   01/28/20 116 lb 12.8 oz (53 kg)         F/u 3 fabio Sanders MD

## 2021-03-01 DIAGNOSIS — E55.9 VITAMIN D DEFICIENCY: ICD-10-CM

## 2021-03-01 DIAGNOSIS — E53.8 B12 DEFICIENCY: ICD-10-CM

## 2021-03-01 DIAGNOSIS — I10 ESSENTIAL HYPERTENSION: ICD-10-CM

## 2021-03-01 DIAGNOSIS — R63.6 UNDERWEIGHT: ICD-10-CM

## 2021-03-01 DIAGNOSIS — F32.A DEPRESSION, UNSPECIFIED DEPRESSION TYPE: ICD-10-CM

## 2021-03-01 LAB
ALBUMIN SERPL-MCNC: 3.6 G/DL (ref 3.5–4.6)
ALP BLD-CCNC: 66 U/L (ref 40–130)
ALT SERPL-CCNC: <5 U/L (ref 0–33)
ANION GAP SERPL CALCULATED.3IONS-SCNC: 13 MEQ/L (ref 9–15)
AST SERPL-CCNC: <5 U/L (ref 0–35)
BILIRUB SERPL-MCNC: 0.3 MG/DL (ref 0.2–0.7)
BUN BLDV-MCNC: 37 MG/DL (ref 8–23)
CALCIUM SERPL-MCNC: 9.4 MG/DL (ref 8.5–9.9)
CHLORIDE BLD-SCNC: 114 MEQ/L (ref 95–107)
CO2: 19 MEQ/L (ref 20–31)
CREAT SERPL-MCNC: 1.34 MG/DL (ref 0.5–0.9)
GFR AFRICAN AMERICAN: 46.7
GFR NON-AFRICAN AMERICAN: 38.6
GLOBULIN: 3.2 G/DL (ref 2.3–3.5)
GLUCOSE BLD-MCNC: 90 MG/DL (ref 70–99)
HCT VFR BLD CALC: 40.3 % (ref 37–47)
HEMOGLOBIN: 13.3 G/DL (ref 12–16)
MCH RBC QN AUTO: 33.6 PG (ref 27–31.3)
MCHC RBC AUTO-ENTMCNC: 32.9 % (ref 33–37)
MCV RBC AUTO: 102.2 FL (ref 82–100)
PDW BLD-RTO: 14.9 % (ref 11.5–14.5)
PLATELET # BLD: 289 K/UL (ref 130–400)
POTASSIUM SERPL-SCNC: 4.6 MEQ/L (ref 3.4–4.9)
RBC # BLD: 3.94 M/UL (ref 4.2–5.4)
SODIUM BLD-SCNC: 146 MEQ/L (ref 135–144)
TOTAL PROTEIN: 6.8 G/DL (ref 6.3–8)
TSH SERPL DL<=0.05 MIU/L-ACNC: 4.01 UIU/ML (ref 0.44–3.86)
VITAMIN B-12: 1063 PG/ML (ref 232–1245)
VITAMIN D 25-HYDROXY: 16.8 NG/ML (ref 30–100)
WBC # BLD: 8.3 K/UL (ref 4.8–10.8)

## 2021-03-05 ENCOUNTER — TELEPHONE (OUTPATIENT)
Dept: FAMILY MEDICINE CLINIC | Age: 76
End: 2021-03-05

## 2021-03-29 DIAGNOSIS — F41.9 ANXIETY: ICD-10-CM

## 2021-03-30 RX ORDER — CLONAZEPAM 0.5 MG/1
TABLET ORAL
Qty: 30 TABLET | Refills: 2 | Status: SHIPPED | OUTPATIENT
Start: 2021-03-30 | End: 2021-05-18

## 2021-04-12 ENCOUNTER — OFFICE VISIT (OUTPATIENT)
Dept: PULMONOLOGY | Age: 76
End: 2021-04-12
Payer: MEDICARE

## 2021-04-12 VITALS
OXYGEN SATURATION: 98 % | BODY MASS INDEX: 16.1 KG/M2 | HEIGHT: 62 IN | WEIGHT: 87.5 LBS | DIASTOLIC BLOOD PRESSURE: 103 MMHG | SYSTOLIC BLOOD PRESSURE: 155 MMHG | HEART RATE: 96 BPM | TEMPERATURE: 97.1 F

## 2021-04-12 DIAGNOSIS — R06.02 SHORTNESS OF BREATH: ICD-10-CM

## 2021-04-12 DIAGNOSIS — J44.9 CHRONIC OBSTRUCTIVE PULMONARY DISEASE, UNSPECIFIED COPD TYPE (HCC): Primary | ICD-10-CM

## 2021-04-12 DIAGNOSIS — Z72.0 TOBACCO ABUSE: ICD-10-CM

## 2021-04-12 PROCEDURE — 99214 OFFICE O/P EST MOD 30 MIN: CPT | Performed by: INTERNAL MEDICINE

## 2021-04-12 ASSESSMENT — ENCOUNTER SYMPTOMS
VOMITING: 0
EYE DISCHARGE: 0
SORE THROAT: 0
DIARRHEA: 0
WHEEZING: 1
EYE ITCHING: 0
SHORTNESS OF BREATH: 1
NAUSEA: 0
TROUBLE SWALLOWING: 0
ABDOMINAL PAIN: 0
COUGH: 1
VOICE CHANGE: 0
SINUS PRESSURE: 0
CHEST TIGHTNESS: 0
RHINORRHEA: 0

## 2021-04-12 NOTE — PROGRESS NOTES
Subjective:     Elliot Babin is a 77 y.o. female who complains today of:     Chief Complaint   Patient presents with    COPD     3 month f/u       HPI  She is using bronchodilator with albuterol neb and albuterol HFA and stiolto respimat 2 puff in AM   C/o shortness of breath with exertion. On and off Wheezing   Cough with  Clear Sputum  No Hemoptysis  No Chest tightness   No Chest pain with radiation  or pleuritic pain  No Fever or chills. No Rhinorrhea and postnasal drip.   She is still smoking 2 ppd     Allergies:  Aspirin, Penicillins, and Adhesive tape  Past Medical History:   Diagnosis Date    Bowel disease     Cataract     Chronic back pain     COPD (chronic obstructive pulmonary disease) (HCC)     Dementia (HCC)     Dizziness and giddiness     GERD (gastroesophageal reflux disease)     Glaucoma     Hypertension     Osteoarthritis     Pancreatitis     Seizures (HCC)     TIA (transient ischemic attack)      Past Surgical History:   Procedure Laterality Date    APPENDECTOMY      CHOLECYSTECTOMY      INTESTINAL BYPASS      jejunoileal     Family History   Problem Relation Age of Onset    Arthritis Other     Heart Disease Other     High Blood Pressure Other     Kidney Disease Other     Other Other         respiratory problems    Breast Cancer Neg Hx     Colon Cancer Neg Hx     Diabetes Neg Hx     Cancer Neg Hx     Eclampsia Neg Hx     Ovarian Cancer Neg Hx     Hypertension Neg Hx      Labor Neg Hx     Spont Abortions Neg Hx     Stroke Neg Hx      Social History     Socioeconomic History    Marital status:      Spouse name: Not on file    Number of children: Not on file    Years of education: Not on file    Highest education level: Not on file   Occupational History    Not on file   Social Needs    Financial resource strain: Not on file    Food insecurity     Worry: Not on file     Inability: Not on file    Transportation needs     Medical: Not on file Non-medical: Not on file   Tobacco Use    Smoking status: Current Every Day Smoker     Packs/day: 3.00     Years: 54.00     Pack years: 162.00     Types: Cigarettes    Smokeless tobacco: Never Used   Substance and Sexual Activity    Alcohol use: No     Alcohol/week: 0.0 standard drinks    Drug use: No    Sexual activity: Not Currently     Partners: Male     Comment:    Lifestyle    Physical activity     Days per week: Not on file     Minutes per session: Not on file    Stress: Not on file   Relationships    Social connections     Talks on phone: Not on file     Gets together: Not on file     Attends Jehovah's witness service: Not on file     Active member of club or organization: Not on file     Attends meetings of clubs or organizations: Not on file     Relationship status: Not on file    Intimate partner violence     Fear of current or ex partner: Not on file     Emotionally abused: Not on file     Physically abused: Not on file     Forced sexual activity: Not on file   Other Topics Concern    Not on file   Social History Narrative    Not on file         Review of Systems   Constitutional: Negative for chills, diaphoresis, fatigue and fever. HENT: Negative for congestion, mouth sores, nosebleeds, postnasal drip, rhinorrhea, sinus pressure, sneezing, sore throat, trouble swallowing and voice change. Eyes: Negative for discharge, itching and visual disturbance. Respiratory: Positive for cough, shortness of breath and wheezing. Negative for chest tightness. Cardiovascular: Negative for chest pain, palpitations and leg swelling. Gastrointestinal: Negative for abdominal pain, diarrhea, nausea and vomiting. Genitourinary: Negative for difficulty urinating and hematuria. Musculoskeletal: Negative for arthralgias, joint swelling and myalgias. Skin: Negative for rash. Allergic/Immunologic: Negative for environmental allergies and food allergies.    Neurological: Negative for dizziness, tremors, Bromide-Olodaterol 2.5-2.5 MCG/ACT AERS Inhale 2 puffs into the lungs daily Lot: [id number]  Exo: 12/22 3 Inhaler 0    albuterol sulfate HFA (VENTOLIN HFA) 108 (90 Base) MCG/ACT inhaler Inhale 2 puffs into the lungs every 6 hours as needed for Wheezing 3 Inhaler 3    mirtazapine (REMERON) 15 MG tablet TAKE 1 TABLET BY MOUTH EVERY NIGHT 30 tablet 3    losartan (COZAAR) 50 MG tablet Take 1 tablet by mouth daily 90 tablet 1    clotrimazole-betamethasone (LOTRISONE) 1-0.05 % cream APPLY TOPICALLY TWICE A DAY 45 g 14    propranolol (INDERAL) 40 MG tablet TAKE 1 TABLET TWICE A DAY (REPLACES PREVIOUS PRESCRIPTION) 180 tablet 3    pantoprazole (PROTONIX) 40 MG tablet TAKE 1 TABLET DAILY 90 tablet 3    CREON 65144-72619 units delayed release capsule TAKE 1 CAPSULE TWICE A  capsule 3    clopidogrel (PLAVIX) 75 MG tablet TAKE 1 TABLET DAILY 90 tablet 3    albuterol (PROVENTIL) (2.5 MG/3ML) 0.083% nebulizer solution Take 3 mLs by nebulization every 4 hours as needed for Wheezing 360 each 1    Multiple Vitamins-Minerals (THERAPEUTIC MULTIVITAMIN-MINERALS) tablet Take 1 tablet by mouth daily 90 tablet 3    Magnesium Oxide 400 MG CAPS Take 400 mg by mouth 3 times daily 270 capsule 3     No current facility-administered medications for this visit. Results for orders placed during the hospital encounter of 05/08/20   XR CHEST STANDARD (2 VW)    Narrative XR CHEST (2 VW)    Exam Date/Time:  5/8/2020 2:12 PM  Clinical History:  J44.1 COPD exacerbation (HCC) ICD10  shorts of breath, difficulty breathing, weight loss, history of COPD. Comparison:  3/23/2020      RESULT:         Lungs and pleura:  Emphysematous changes. No focal consolidation. Biapical pleural-parenchymal scarring. Scarring at the lung bases. No pleural effusion. No pneumothorax. Normal pulmonary vascular pattern. Cardiomediastinal silhouette:  Normal.    Other:  No acute osseous findings. Surgical clip upper abdomen near midline. Impression No acute radiographic abnormality. Emphysema.   ]  Results for orders placed during the hospital encounter of 03/23/20   XR CHEST PORTABLE    Narrative EXAMINATION: PORTABLE CHEST X-RAY FROM 3/23/2020 AT 17:52 HOURS    CLINICAL HISTORY: SMOKING HISTORY AND INTERMITTENT DYSPNEA X2 WEEKS    COMPARISONS: 5/19/2015    FINDINGS: The heart is not enlarged. There is mild atherosclerotic calcification and tortuosity of the aorta. There is hyperinflation of the lungs suggesting COPD. There is blunting of the right CP angle similar to the prior chest film and this likely   represents pleural scarring in the right lung base. No acute pulmonary infiltrates or pleural effusions. No pneumothorax. There is degenerative bone spurring in the spine. Impression 1. NO RADIOGRAPHIC EVIDENCE OF ACTIVE DISEASE IN THE CHEST. 2. HYPERINFLATION OF THE LUNGS SUGGESTIVE OF COPD.   ]  No results found for this or any previous visit.]  Results for orders placed during the hospital encounter of 03/23/20   CT CHEST WO CONTRAST    Narrative CT of the Chest is contrast medium. History:  55 year smoker. Complains weight loss the. Rule out mass. Technical Factors:    CT imaging of the chest was obtained and formatted as 5 mm contiguous axial images from the thoracic inlet through the adrenal glands. Sagittal coronal reconstruction obtained during postprocessing. Intravenous contrast medium:  None    Comparison:  Chest radiograph March 23, 2020, May 19, 2015. Findings:    Bilateral diffuse emphysematous change. No nodules, and no masses bilaterally. Scant dependent subsegmental atelectatic change posterior right lung base. No pleural effusion. No hilar, mediastinal, or axillary lymph node enlargement. Thoracic aorta normal in course and caliber. Cardiac size normal. No pericardial effusion. Coronary calcification demonstrated. Limited imaging upper abdomen shows no gross anomalies.     No osteoblastic, and no osteolytic lesions. Diffuse anterior osteophytes thoracic spine. Impression Emphysema. No lung masses identified. All CT scans at this facility use dose modulation, iterative reconstruction, and/or weight based dosing when appropriate to reduce radiation dose to as low as reasonably achievable. Assessment/Plan:     1. Chronic obstructive pulmonary disease, unspecified COPD type ([facility] [address]  She is using bronchodilator with albuterol neb and albuterol HFA and stiolto respimat 2 puff in AM   She has c/o shortness of breath with exertion ,  on and off Wheezing and Cough with  Clear Sputum  No Hemoptysis, No Chest tightness or chest pain. She is still smoking 2 ppd. She is using bronchodilator with albuterol neb and albuterol HFA and stiolto respimat 2 puff in AM.  She had a chest x-ray done last year showing emphysema. Will request follow-up chest x-ray next visit    - XR CHEST STANDARD (2 VW); Future    2. Shortness of breath  She is having short of breath which is chronic and unchanged which is likely due to emphysema. Recommend to continue bronchodilator as before    3. Tobacco abuse  She is still smoking 2 pack/day. Advised try to cut down or quit smoking. Smoking related risk explained. Return in about 3 months (around 7/12/2021) for COPD.       Aleks Amos MD

## 2021-04-16 ENCOUNTER — TELEPHONE (OUTPATIENT)
Dept: FAMILY MEDICINE CLINIC | Age: 76
End: 2021-04-16

## 2021-04-16 DIAGNOSIS — E83.42 HYPOMAGNESEMIA: ICD-10-CM

## 2021-04-16 LAB — MAGNESIUM: 1 MG/DL (ref 1.7–2.4)

## 2021-04-17 NOTE — TELEPHONE ENCOUNTER
Lab called and stated pt had critical Magnesium. I spoke with her son who stated that she has only been taking two tablets a day. He was instructed to have her increase to three tablets daily and recheck on Monday. Also instructed on symptoms of low magnesium and if they occur to go to ER for further evaluation. Son denies any current symptoms.

## 2021-04-19 DIAGNOSIS — I10 ESSENTIAL HYPERTENSION: ICD-10-CM

## 2021-04-19 DIAGNOSIS — E83.42 HYPOMAGNESEMIA: Primary | ICD-10-CM

## 2021-04-23 ENCOUNTER — TELEPHONE (OUTPATIENT)
Dept: FAMILY MEDICINE CLINIC | Age: 76
End: 2021-04-23

## 2021-04-26 DIAGNOSIS — F32.A DEPRESSION, UNSPECIFIED DEPRESSION TYPE: ICD-10-CM

## 2021-04-26 RX ORDER — UBIDECARENONE 30 MG
1 CAPSULE ORAL DAILY
Qty: 90 TABLET | Refills: 1 | Status: SHIPPED | OUTPATIENT
Start: 2021-04-26 | End: 2021-11-04 | Stop reason: SDUPTHER

## 2021-04-26 RX ORDER — MIRTAZAPINE 15 MG/1
TABLET, FILM COATED ORAL
Qty: 30 TABLET | Refills: 3 | Status: SHIPPED | OUTPATIENT
Start: 2021-04-26 | End: 2021-08-23

## 2021-04-27 DIAGNOSIS — I10 ESSENTIAL HYPERTENSION: ICD-10-CM

## 2021-04-27 DIAGNOSIS — E83.42 HYPOMAGNESEMIA: ICD-10-CM

## 2021-04-27 LAB
ANION GAP SERPL CALCULATED.3IONS-SCNC: 8 MEQ/L (ref 9–15)
BUN BLDV-MCNC: 30 MG/DL (ref 8–23)
CALCIUM SERPL-MCNC: 9.1 MG/DL (ref 8.5–9.9)
CHLORIDE BLD-SCNC: 110 MEQ/L (ref 95–107)
CO2: 22 MEQ/L (ref 20–31)
CREAT SERPL-MCNC: 1.18 MG/DL (ref 0.5–0.9)
GFR AFRICAN AMERICAN: 54
GFR NON-AFRICAN AMERICAN: 44.7
GLUCOSE BLD-MCNC: 69 MG/DL (ref 70–99)
MAGNESIUM: 2 MG/DL (ref 1.7–2.4)
POTASSIUM SERPL-SCNC: 4.7 MEQ/L (ref 3.4–4.9)
SODIUM BLD-SCNC: 140 MEQ/L (ref 135–144)

## 2021-05-15 DIAGNOSIS — F41.9 ANXIETY: ICD-10-CM

## 2021-05-17 DIAGNOSIS — E83.42 HYPOMAGNESEMIA: ICD-10-CM

## 2021-05-17 RX ORDER — CALCIUM CARBONATE/VITAMIN D3 500-10/5ML
400 LIQUID (ML) ORAL 3 TIMES DAILY
Qty: 270 CAPSULE | Refills: 3 | Status: SHIPPED | OUTPATIENT
Start: 2021-05-17 | End: 2021-08-24 | Stop reason: SDUPTHER

## 2021-05-18 RX ORDER — CLONAZEPAM 0.5 MG/1
TABLET ORAL
Qty: 30 TABLET | Refills: 2 | Status: SHIPPED | OUTPATIENT
Start: 2021-05-18 | End: 2021-08-20

## 2021-05-20 RX ORDER — LOSARTAN POTASSIUM 50 MG/1
50 TABLET ORAL DAILY
Qty: 90 TABLET | Refills: 1 | Status: SHIPPED | OUTPATIENT
Start: 2021-05-20 | End: 2021-08-23

## 2021-05-24 ENCOUNTER — OFFICE VISIT (OUTPATIENT)
Dept: FAMILY MEDICINE CLINIC | Age: 76
End: 2021-05-24
Payer: MEDICARE

## 2021-05-24 VITALS
BODY MASS INDEX: 16.08 KG/M2 | OXYGEN SATURATION: 96 % | HEART RATE: 79 BPM | DIASTOLIC BLOOD PRESSURE: 80 MMHG | HEIGHT: 62 IN | SYSTOLIC BLOOD PRESSURE: 130 MMHG | WEIGHT: 87.4 LBS | TEMPERATURE: 99 F

## 2021-05-24 DIAGNOSIS — F41.9 ANXIETY: ICD-10-CM

## 2021-05-24 DIAGNOSIS — J44.9 CHRONIC OBSTRUCTIVE PULMONARY DISEASE, UNSPECIFIED COPD TYPE (HCC): ICD-10-CM

## 2021-05-24 DIAGNOSIS — E55.9 VITAMIN D DEFICIENCY: ICD-10-CM

## 2021-05-24 DIAGNOSIS — I10 ESSENTIAL HYPERTENSION: ICD-10-CM

## 2021-05-24 DIAGNOSIS — L30.9 ECZEMA, UNSPECIFIED TYPE: ICD-10-CM

## 2021-05-24 DIAGNOSIS — E83.42 HYPOMAGNESEMIA: ICD-10-CM

## 2021-05-24 DIAGNOSIS — F03.91 DEMENTIA WITH BEHAVIORAL DISTURBANCE, UNSPECIFIED DEMENTIA TYPE: ICD-10-CM

## 2021-05-24 DIAGNOSIS — R63.6 UNDERWEIGHT: ICD-10-CM

## 2021-05-24 DIAGNOSIS — H81.10 BENIGN PAROXYSMAL POSITIONAL VERTIGO, UNSPECIFIED LATERALITY: Primary | ICD-10-CM

## 2021-05-24 DIAGNOSIS — K21.9 GASTROESOPHAGEAL REFLUX DISEASE, UNSPECIFIED WHETHER ESOPHAGITIS PRESENT: ICD-10-CM

## 2021-05-24 PROCEDURE — 99213 OFFICE O/P EST LOW 20 MIN: CPT | Performed by: FAMILY MEDICINE

## 2021-05-24 RX ORDER — MECLIZINE HCL 12.5 MG/1
12.5 TABLET ORAL 3 TIMES DAILY PRN
Qty: 30 TABLET | Refills: 0 | Status: SHIPPED | OUTPATIENT
Start: 2021-05-24 | End: 2021-06-03

## 2021-05-24 RX ORDER — CLOTRIMAZOLE AND BETAMETHASONE DIPROPIONATE 10; .64 MG/G; MG/G
CREAM TOPICAL
Qty: 45 G | Refills: 14 | Status: SHIPPED | OUTPATIENT
Start: 2021-05-24

## 2021-05-24 SDOH — ECONOMIC STABILITY: FOOD INSECURITY: WITHIN THE PAST 12 MONTHS, THE FOOD YOU BOUGHT JUST DIDN'T LAST AND YOU DIDN'T HAVE MONEY TO GET MORE.: NEVER TRUE

## 2021-05-24 SDOH — ECONOMIC STABILITY: FOOD INSECURITY: WITHIN THE PAST 12 MONTHS, YOU WORRIED THAT YOUR FOOD WOULD RUN OUT BEFORE YOU GOT MONEY TO BUY MORE.: NEVER TRUE

## 2021-05-24 ASSESSMENT — SOCIAL DETERMINANTS OF HEALTH (SDOH): HOW HARD IS IT FOR YOU TO PAY FOR THE VERY BASICS LIKE FOOD, HOUSING, MEDICAL CARE, AND HEATING?: NOT HARD AT ALL

## 2021-05-24 NOTE — PROGRESS NOTES
Chief Complaint   Patient presents with    Weight Loss     eating but not gaining weight    Dizziness     when laying down, \"feels like head floating\"     Health Maintenance     declined lung ct and colon        HPI:  Corine Riding is a 77 y.o. female     Follow up    No falls  Feels a little off when walking, but is using walker  Feels like head \"floating\" when lying down  Odd feeling in the ears/room spinning slightly     Ongoing leg pain/soreness    Weight stable, but not gaining  Eating, but not much     Would eat better when  was alive      Wt Readings from Last 3 Encounters:   05/24/21 87 lb 6.4 oz (39.6 kg)   04/12/21 87 lb 8 oz (39.7 kg)   02/22/21 88 lb (39.9 kg)     Declining screens  Compliant with meds for HTN    GERD, on protonix  COPD, uses albuterol    plavix for reported hx of TIA    She had ileojejunal bypass in 1978            Wt Readings from Last 3 Encounters:   05/24/21 87 lb 6.4 oz (39.6 kg)   04/12/21 87 lb 8 oz (39.7 kg)   02/22/21 88 lb (39.9 kg)         Patient Active Problem List   Diagnosis    Hypertensive disorder    Venous tributary (branch) occlusion of retina    Cortical senile cataract    Macular degeneration, age related, nonexudative    Nuclear senile cataract    Gastroesophageal reflux disease    History of disease    Cobalamin deficiency    Anxiety    Chronic obstructive lung disease ([address]    COPD exacerbation (HCC)    Postmenopausal bleeding    Shortness of breath    Tobacco abuse       Current Outpatient Medications   Medication Sig Dispense Refill    clotrimazole-betamethasone (LOTRISONE) 1-0.05 % cream APPLY TOPICALLY TWICE A DAY 45 g 14    meclizine (ANTIVERT) 12.5 MG tablet Take 1 tablet by mouth 3 times daily as needed for Dizziness 30 tablet 0    losartan (COZAAR) 50 MG tablet Take 1 tablet by mouth daily 90 tablet 1    clonazePAM (KLONOPIN) 0.5 MG tablet TAKE 1 TABLET NIGHTLY AS NEEDED FOR ANXIETY 30 tablet 2    Magnesium Oxide 400 MG CAPS Take 400 mg by mouth 3 times daily 270 capsule 3    mirtazapine (REMERON) 15 MG tablet TAKE 1 TABLET BY MOUTH EVERY NIGHT 30 tablet 3    Multiple Vitamins-Minerals (MULTI FOR HER 50+) TABS Take 1 tablet by mouth daily 90 tablet 1    metroNIDAZOLE (FLAGYL) 500 MG tablet TAKE 1 TABLET THREE TIMES A DAY FOR 10 DAYS PER MONTH 90 tablet 3    Tiotropium Bromide-Olodaterol 2.5-2.5 MCG/ACT AERS Inhale 2 puffs into the lungs daily Lot: [id number]  Exo: 12/22 3 Inhaler 0    albuterol sulfate HFA (VENTOLIN HFA) 108 (90 Base) MCG/ACT inhaler Inhale 2 puffs into the lungs every 6 hours as needed for Wheezing 3 Inhaler 3    propranolol (INDERAL) 40 MG tablet TAKE 1 TABLET TWICE A DAY (REPLACES PREVIOUS PRESCRIPTION) 180 tablet 3    pantoprazole (PROTONIX) 40 MG tablet TAKE 1 TABLET DAILY 90 tablet 3    CREON 75993-61772 units delayed release capsule TAKE 1 CAPSULE TWICE A  capsule 3    clopidogrel (PLAVIX) 75 MG tablet TAKE 1 TABLET DAILY 90 tablet 3    albuterol (PROVENTIL) (2.5 MG/3ML) 0.083% nebulizer solution Take 3 mLs by nebulization every 4 hours as needed for Wheezing 360 each 1    Multiple Vitamins-Minerals (THERAPEUTIC MULTIVITAMIN-MINERALS) tablet Take 1 tablet by mouth daily 90 tablet 3     No current facility-administered medications for this visit.          Past Medical History:   Diagnosis Date    Bowel disease     Cataract     Chronic back pain     COPD (chronic obstructive pulmonary disease) (HCC)     Dementia (HCC)     Dizziness and giddiness     GERD (gastroesophageal reflux disease)     Glaucoma     Hypertension     Osteoarthritis     Pancreatitis     Seizures (HCC)     TIA (transient ischemic attack)      Past Surgical History:   Procedure Laterality Date    APPENDECTOMY      CHOLECYSTECTOMY      INTESTINAL BYPASS      jejunoileal     Family History   Problem Relation Age of Onset    Arthritis Other     Heart Disease Other     High Blood Pressure Other     Kidney Disease Other     Other Other         respiratory problems    Breast Cancer Neg Hx     Colon Cancer Neg Hx     Diabetes Neg Hx     Cancer Neg Hx     Eclampsia Neg Hx     Ovarian Cancer Neg Hx     Hypertension Neg Hx      Labor Neg Hx     Spont Abortions Neg Hx     Stroke Neg Hx      Social History     Socioeconomic History    Marital status:      Spouse name: None    Number of children: None    Years of education: None    Highest education level: None   Occupational History    None   Tobacco Use    Smoking status: Current Every Day Smoker     Packs/day: 3.00     Years: 54.00     Pack years: 162.00     Types: Cigarettes    Smokeless tobacco: Never Used   Substance and Sexual Activity    Alcohol use: No     Alcohol/week: 0.0 standard drinks    Drug use: No    Sexual activity: Not Currently     Partners: Male     Comment:    Other Topics Concern    None   Social History Narrative    None     Social Determinants of Health     Financial Resource Strain: Low Risk     Difficulty of Paying Living Expenses: Not hard at all   Food Insecurity: No Food Insecurity    Worried About Running Out of Food in the Last Year: Never true    Nirav of Food in the Last Year: Never true   Transportation Needs: No Transportation Needs    Lack of Transportation (Medical): No    Lack of Transportation (Non-Medical): No   Physical Activity:     Days of Exercise per Week:     Minutes of Exercise per Session:    Stress:     Feeling of Stress :    Social Connections:     Frequency of Communication with Friends and Family:     Frequency of Social Gatherings with Friends and Family:     Attends [employer] Services:     Active Member of Clubs or Organizations:     Attends Club or Organization Meetings:     Marital Status:    Intimate Partner Violence:     Fear of Current or Ex-Partner:     Emotionally Abused:     Physically Abused:     Sexually Abused:       Allergies   Allergen Reactions    Aspirin      Bothers stomach    Penicillins      Unable to take    Adhesive Tape Rash     Patient states Silk Tape works the best       Review of Systems:   General ROS: grief  Respiratory ROS: improved SOB  Cardiovascular ROS: no chest pain or dyspnea on exertion  Gastrointestinal ROS: hx of ileojejunal bypass  Genito-Urinary ROS: no dysuria, trouble voiding  Musculoskeletal ROS: some pain in her lower legs  Neurological ROS: negative for - behavioral changes, memory loss, numbness/tingling, tremors or weakness    In general patient otherwise reports feeling well. Physical Exam:  /80 (Site: Left Upper Arm)   Pulse 79   Temp 99 °F (37.2 °C)   Ht 5' 2\" (1.575 m)   Wt 87 lb 6.4 oz (39.6 kg)   LMP  (LMP Unknown)   SpO2 96%   Breastfeeding No   BMI 15.99 kg/m²     Gen: Well, NAD, Alert, Oriented x 3  HEENT: EOMI, eyes clear, MMM  Skin: no rash  Neck: no significant lymphadenopathy or thyromegaly  Lungs: exp wheeze   Heart: RRR, S1S2, w/out M/R/G, non-displaced PMI   Ext:trace pedal edema  Psych: she appears euthymic  Patient has BUE and LUE strength deficits of 4/5    Lab Results   Component Value Date    WBC 8.3 03/01/2021    HGB 13.3 03/01/2021    HCT 40.3 03/01/2021     03/01/2021    CHOL 118 12/08/2017    TRIG 235 (H) 12/08/2017    HDL 26 (L) 12/08/2017    ALT <5 03/01/2021    AST <5 03/01/2021     04/27/2021    K 4.7 04/27/2021     (H) 04/27/2021    CREATININE 1.18 (H) 04/27/2021    BUN 30 (H) 04/27/2021    CO2 22 04/27/2021    TSH 4.010 (H) 03/01/2021    INR 0.9 11/15/2019         A&P   Diagnosis Orders   1. Benign paroxysmal positional vertigo, unspecified laterality  meclizine (ANTIVERT) 12.5 MG tablet   2. Eczema, unspecified type  clotrimazole-betamethasone (LOTRISONE) 1-0.05 % cream   3. Anxiety     4. Hypomagnesemia     5. Essential hypertension     6. Vitamin D deficiency     7. Chronic obstructive pulmonary disease, unspecified [facility] ([facility]     8.  Underweight 9. Dementia with behavioral disturbance, unspecified dementia type ([facility] [address]     10. Gastroesophageal reflux disease, unspecified whether esophagitis present       antivert for mild vertigo     Refuses screens    Refuses covid vaccine     Klonopin on going    Not taking aricept    Living with daughter    Wt Readings from Last 3 Encounters:   05/24/21 87 lb 6.4 oz (39.6 kg)   04/12/21 87 lb 8 oz (39.7 kg)   02/22/21 88 lb (39.9 kg)           Patient is only able to use walker for short distances with increased risk of falling  Given hand  and upper and lower extremity weakness, she would benefit from the use of a transport chair within the home and in going from the car to doctor's appointments and other errands.      She is unable to safely use only a cane due to extremity weakness    Patricio Dasilva MD        Moderate MDM

## 2021-07-19 ENCOUNTER — OFFICE VISIT (OUTPATIENT)
Dept: PULMONOLOGY | Age: 76
End: 2021-07-19
Payer: MEDICARE

## 2021-07-19 VITALS
SYSTOLIC BLOOD PRESSURE: 138 MMHG | TEMPERATURE: 98.2 F | HEIGHT: 62 IN | WEIGHT: 87 LBS | OXYGEN SATURATION: 99 % | DIASTOLIC BLOOD PRESSURE: 62 MMHG | HEART RATE: 70 BPM | BODY MASS INDEX: 16.01 KG/M2

## 2021-07-19 DIAGNOSIS — J44.9 CHRONIC OBSTRUCTIVE PULMONARY DISEASE, UNSPECIFIED COPD TYPE (HCC): Primary | ICD-10-CM

## 2021-07-19 DIAGNOSIS — R06.02 SHORTNESS OF BREATH: ICD-10-CM

## 2021-07-19 DIAGNOSIS — Z72.0 TOBACCO ABUSE: ICD-10-CM

## 2021-07-19 PROCEDURE — 99214 OFFICE O/P EST MOD 30 MIN: CPT | Performed by: INTERNAL MEDICINE

## 2021-07-19 ASSESSMENT — ENCOUNTER SYMPTOMS
RHINORRHEA: 0
VOMITING: 0
SORE THROAT: 0
SHORTNESS OF BREATH: 1
EYE DISCHARGE: 0
SINUS PRESSURE: 0
COUGH: 1
TROUBLE SWALLOWING: 0
WHEEZING: 1
NAUSEA: 0
ABDOMINAL PAIN: 0
EYE ITCHING: 0
DIARRHEA: 0
VOICE CHANGE: 0
CHEST TIGHTNESS: 0

## 2021-07-19 NOTE — PROGRESS NOTES
Subjective:     Arian Orellana is a 77 y.o. female who complains today of:     Chief Complaint   Patient presents with    COPD     3 month f/u       HPI    She is using bronchodilator with albuterol neb and albuterol HFA and stiolto respimat 2 puff in AM   C/o shortness of breath , worse with exertion. Occasional Wheezing. C/o Cough with white Sputum. No Hemoptysis. No Chest tightness. No Chest pain with radiation  or pleuritic pain. No  leg edema. No orthopnea. No Fever or chills. No Rhinorrhea and postnasal drip.   She is still smoking 2 ppd           Allergies:  Aspirin, Penicillins, and Adhesive tape  Past Medical History:   Diagnosis Date    Bowel disease     Cataract     Chronic back pain     COPD (chronic obstructive pulmonary disease) (HCC)     Dementia (HCC)     Dizziness and giddiness     GERD (gastroesophageal reflux disease)     Glaucoma     Hypertension     Osteoarthritis     Pancreatitis     Seizures (HCC)     TIA (transient ischemic attack)      Past Surgical History:   Procedure Laterality Date    APPENDECTOMY      CHOLECYSTECTOMY      INTESTINAL BYPASS      jejunoileal     Family History   Problem Relation Age of Onset    Arthritis Other     Heart Disease Other     High Blood Pressure Other     Kidney Disease Other     Other Other         respiratory problems    Breast Cancer Neg Hx     Colon Cancer Neg Hx     Diabetes Neg Hx     Cancer Neg Hx     Eclampsia Neg Hx     Ovarian Cancer Neg Hx     Hypertension Neg Hx      Labor Neg Hx     Spont Abortions Neg Hx     Stroke Neg Hx      Social History     Socioeconomic History    Marital status:      Spouse name: Not on file    Number of children: Not on file    Years of education: Not on file    Highest education level: Not on file   Occupational History    Not on file   Tobacco Use    Smoking status: Current Every Day Smoker     Packs/day: 3.00     Years: 54.00     Pack years: 162.00 Types: Cigarettes    Smokeless tobacco: Never Used   Substance and Sexual Activity    Alcohol use: No     Alcohol/week: 0.0 standard drinks    Drug use: No    Sexual activity: Not Currently     Partners: Male     Comment:    Other Topics Concern    Not on file   Social History Narrative    Not on file     Social Determinants of Health     Financial Resource Strain: Low Risk     Difficulty of Paying Living Expenses: Not hard at all   Food Insecurity: No Food Insecurity    Worried About Running Out of Food in the Last Year: Never true    Nirav of Food in the Last Year: Never true   Transportation Needs: No Transportation Needs    Lack of Transportation (Medical): No    Lack of Transportation (Non-Medical): No   Physical Activity:     Days of Exercise per Week:     Minutes of Exercise per Session:    Stress:     Feeling of Stress :    Social Connections:     Frequency of Communication with Friends and Family:     Frequency of Social Gatherings with Friends and Family:     Attends Taoism Services:     Active Member of Clubs or Organizations:     Attends Club or Organization Meetings:     Marital Status:    Intimate Partner Violence:     Fear of Current or Ex-Partner:     Emotionally Abused:     Physically Abused:     Sexually Abused:          Review of Systems   Constitutional: Negative for chills, diaphoresis, fatigue and fever. HENT: Negative for congestion, mouth sores, nosebleeds, postnasal drip, rhinorrhea, sinus pressure, sneezing, sore throat, trouble swallowing and voice change. Eyes: Negative for discharge, itching and visual disturbance. Respiratory: Positive for cough, shortness of breath and wheezing. Negative for chest tightness. Cardiovascular: Negative for chest pain, palpitations and leg swelling. Gastrointestinal: Negative for abdominal pain, diarrhea, nausea and vomiting. Genitourinary: Negative for difficulty urinating and hematuria.    Musculoskeletal: Negative for arthralgias, joint swelling and myalgias. Skin: Negative for rash. Allergic/Immunologic: Negative for environmental allergies and food allergies. Neurological: Negative for dizziness, tremors, weakness and headaches. Psychiatric/Behavioral: Negative for behavioral problems and sleep disturbance.         :     Vitals:    07/19/21 1419   BP: 138/62   Pulse: 70   Temp: 98.2 °F (36.8 °C)   SpO2: 99%   Weight: 87 lb (39.5 kg)   Height: 5' 2\" (1.575 m)     Wt Readings from Last 3 Encounters:   07/19/21 87 lb (39.5 kg)   05/24/21 87 lb 6.4 oz (39.6 kg)   04/12/21 87 lb 8 oz (39.7 kg)         Physical Exam  Constitutional:       General: She is not in acute distress. Appearance: She is well-developed. She is not diaphoretic. HENT:      Head: Normocephalic and atraumatic. Nose: Nose normal.   Eyes:      Pupils: Pupils are equal, round, and reactive to light. Neck:      Thyroid: No thyromegaly. Vascular: No JVD. Trachea: No tracheal deviation. Cardiovascular:      Rate and Rhythm: Normal rate and regular rhythm. Heart sounds: No murmur heard. No friction rub. No gallop. Pulmonary:      Effort: No respiratory distress. Breath sounds: No wheezing or rales. Comments: diminished Breath sound bilaterally. Chest:      Chest wall: No tenderness. Abdominal:      General: There is no distension. Tenderness: There is no abdominal tenderness. There is no rebound. Musculoskeletal:         General: Normal range of motion. Lymphadenopathy:      Cervical: No cervical adenopathy. Skin:     General: Skin is warm and dry. Neurological:      Mental Status: She is alert and oriented to person, place, and time.       Coordination: Coordination normal.         Current Outpatient Medications   Medication Sig Dispense Refill    clotrimazole-betamethasone (LOTRISONE) 1-0.05 % cream APPLY TOPICALLY TWICE A DAY 45 g 14    losartan (COZAAR) 50 MG tablet Take 1 tablet by mouth daily 90 tablet 1    Magnesium Oxide 400 MG CAPS Take 400 mg by mouth 3 times daily 270 capsule 3    mirtazapine (REMERON) 15 MG tablet TAKE 1 TABLET BY MOUTH EVERY NIGHT 30 tablet 3    Multiple Vitamins-Minerals (MULTI FOR HER 50+) TABS Take 1 tablet by mouth daily 90 tablet 1    metroNIDAZOLE (FLAGYL) 500 MG tablet TAKE 1 TABLET THREE TIMES A DAY FOR 10 DAYS PER MONTH 90 tablet 3    Tiotropium Bromide-Olodaterol 2.5-2.5 MCG/ACT AERS Inhale 2 puffs into the lungs daily Lot: [id number]  Exo: 12/22 3 Inhaler 0    albuterol sulfate HFA (VENTOLIN HFA) 108 (90 Base) MCG/ACT inhaler Inhale 2 puffs into the lungs every 6 hours as needed for Wheezing 3 Inhaler 3    propranolol (INDERAL) 40 MG tablet TAKE 1 TABLET TWICE A DAY (REPLACES PREVIOUS PRESCRIPTION) 180 tablet 3    pantoprazole (PROTONIX) 40 MG tablet TAKE 1 TABLET DAILY 90 tablet 3    CREON 98099-95471 units delayed release capsule TAKE 1 CAPSULE TWICE A  capsule 3    clopidogrel (PLAVIX) 75 MG tablet TAKE 1 TABLET DAILY 90 tablet 3    albuterol (PROVENTIL) (2.5 MG/3ML) 0.083% nebulizer solution Take 3 mLs by nebulization every 4 hours as needed for Wheezing 360 each 1    clonazePAM (KLONOPIN) 0.5 MG tablet TAKE 1 TABLET NIGHTLY AS NEEDED FOR ANXIETY 30 tablet 2    Multiple Vitamins-Minerals (THERAPEUTIC MULTIVITAMIN-MINERALS) tablet Take 1 tablet by mouth daily 90 tablet 3     No current facility-administered medications for this visit. Results for orders placed in visit on 04/16/21    XR CHEST STANDARD (2 VW)    Narrative  DIAGNOSIS:J44.9 Chronic obstructive pulmonary disease, unspecified COPD type ([facility] ICD10    COMPARISON: March 24, 2020; May 8, 2020    TECHNIQUE: PA and lateral chest    FINDINGS: The lungs are hyperlucent inflated and there is coarsening of the interstitium. The costophrenic angle is again blunted which may represents chronic scarring. Biapical pleural thickening is again seen.  The lungs contain no focal infiltrate,  consolidation or pneumothorax. The cardiac silhouette is not enlarged. Degenerative changes are seen at multiple levels in the dorsal spine. .    Impression  Findings are suggestive of COPD      Results for orders placed during the hospital encounter of 05/08/20    XR CHEST STANDARD (2 VW)    Narrative  XR CHEST (2 VW)    Exam Date/Time:  5/8/2020 2:12 PM  Clinical History:  J44.1 COPD exacerbation (HCC) ICD10  shorts of breath, difficulty breathing, weight loss, history of COPD. Comparison:  3/23/2020    RESULT:        Lungs and pleura:  Emphysematous changes. No focal consolidation. Biapical pleural-parenchymal scarring. Scarring at the lung bases. No pleural effusion. No pneumothorax. Normal pulmonary vascular pattern. Cardiomediastinal silhouette:  Normal.    Other:  No acute osseous findings. Surgical clip upper abdomen near midline. Impression  No acute radiographic abnormality. Emphysema. Results for orders placed during the hospital encounter of 05/19/15    XR Chest Standard TWO VW    Narrative  EXAMINATION TWO VIEW CHEST - PA/LATERAL    CLINICAL HISTORY CHEST PAIN. COMPARISONS October 4, 2014. FINDINGS The cardiac and mediastinal silhouette is normal.  Lungs are  clear. No pleural effusion or pneumothorax. Bones are intact. IMPRESSION    NO ACUTE CARDIOPULMONARY DISEASE. [facility] By- Kiel Wilson M.D. Released By- Kiel Wilson M.D. Released Date Time- 05/21/15 1657  This document has been electronically signed. ------------------------------------------------------------------------------  ]  Results for orders placed during the hospital encounter of 03/23/20    XR CHEST PORTABLE    Narrative  EXAMINATION: PORTABLE CHEST X-RAY FROM 3/23/2020 AT 17:52 HOURS    CLINICAL HISTORY: SMOKING HISTORY AND INTERMITTENT DYSPNEA X2 WEEKS    COMPARISONS: 5/19/2015    FINDINGS: The heart is not enlarged.  There is mild atherosclerotic calcification and tortuosity of the aorta. There is hyperinflation of the lungs suggesting COPD. There is blunting of the right CP angle similar to the prior chest film and this likely  represents pleural scarring in the right lung base. No acute pulmonary infiltrates or pleural effusions. No pneumothorax. There is degenerative bone spurring in the spine. Impression  1. NO RADIOGRAPHIC EVIDENCE OF ACTIVE DISEASE IN THE CHEST. 2. HYPERINFLATION OF THE LUNGS SUGGESTIVE OF COPD.  ]  No results found for this or any previous visit.  ]  Results for orders placed during the hospital encounter of 03/23/20    CT CHEST WO CONTRAST    Narrative  CT of the Chest is contrast medium. History:  55 year smoker. Complains weight loss the. Rule out mass. Technical Factors:    CT imaging of the chest was obtained and formatted as 5 mm contiguous axial images from the thoracic inlet through the adrenal glands. Sagittal coronal reconstruction obtained during postprocessing. Intravenous contrast medium:  None    Comparison:  Chest radiograph March 23, 2020, May 19, 2015. Findings:    Bilateral diffuse emphysematous change. No nodules, and no masses bilaterally. Scant dependent subsegmental atelectatic change posterior right lung base. No pleural effusion. No hilar, mediastinal, or axillary lymph node enlargement. Thoracic aorta normal in course and caliber. Cardiac size normal. No pericardial effusion. Coronary calcification demonstrated. Limited imaging upper abdomen shows no gross anomalies. No osteoblastic, and no osteolytic lesions. Diffuse anterior osteophytes thoracic spine. Impression  Emphysema. No lung masses identified. All CT scans at this facility use dose modulation, iterative reconstruction, and/or weight based dosing when appropriate to reduce radiation dose to as low as reasonably achievable.  ]  No results found for this or any previous visit.  ]    Assessment/Plan:     1.  Chronic obstructive pulmonary disease, unspecified COPD type ([address]  She is using bronchodilator with albuterol neb and albuterol HFA and stiolto respimat 2 puff in AM   C/o shortness of breath , worse with exertion. Occasional Wheezing. C/o Cough with white Sputum. Continue bronchodilator as before     2. Shortness of breath  She having  Chronic shortness of breath which is likely due to COPD, continue  Bronchodilator as before,  keep  Spo2 90% or above. 3. Tobacco abuse  She is advised try to quit smoking. Risks related to smoking explained to the patient. Different ways to help to quit smoking were discussed today. She si smoking 2 ppd      Return in about 3 months (around 10/19/2021) for COPD.       [id number] Sindi Wylie MD

## 2021-07-21 DIAGNOSIS — J44.9 CHRONIC OBSTRUCTIVE PULMONARY DISEASE, UNSPECIFIED COPD TYPE (HCC): ICD-10-CM

## 2021-07-21 RX ORDER — ALBUTEROL SULFATE 90 UG/1
2 AEROSOL, METERED RESPIRATORY (INHALATION) EVERY 6 HOURS PRN
Qty: 3 INHALER | Refills: 3 | Status: SHIPPED | OUTPATIENT
Start: 2021-07-21 | End: 2021-08-20

## 2021-08-20 DIAGNOSIS — F41.9 ANXIETY: ICD-10-CM

## 2021-08-20 DIAGNOSIS — J44.9 CHRONIC OBSTRUCTIVE PULMONARY DISEASE, UNSPECIFIED COPD TYPE (HCC): ICD-10-CM

## 2021-08-20 RX ORDER — CLONAZEPAM 0.5 MG/1
TABLET ORAL
Qty: 30 TABLET | Refills: 2 | Status: SHIPPED | OUTPATIENT
Start: 2021-08-20 | End: 2021-08-24 | Stop reason: SDUPTHER

## 2021-08-20 RX ORDER — ALBUTEROL SULFATE 90 UG/1
AEROSOL, METERED RESPIRATORY (INHALATION)
Qty: 25.5 G | Refills: 2 | Status: SHIPPED | OUTPATIENT
Start: 2021-08-20 | End: 2021-08-24 | Stop reason: SDUPTHER

## 2021-08-20 NOTE — TELEPHONE ENCOUNTER
Future Appointments     Provider   8/24/2021 Meenakshi Roblero MD   Department: [facility] Primary Care   Appt Notes: 3 month follow up   10/21/2021 Moises Davidson MD   Department: [facility] Pulmonology   Appt Notes: 3M F/U   Recent Visits    07/19/2021 Chronic obstructive pulmonary disease, unspecified COPD type [facility])   Benito Wolf MD   05/24/2021 Benign paroxysmal positional vertigo, unspecified laterality   Zaki Kitchen MD

## 2021-08-22 DIAGNOSIS — F32.A DEPRESSION, UNSPECIFIED DEPRESSION TYPE: ICD-10-CM

## 2021-08-23 RX ORDER — MIRTAZAPINE 15 MG/1
TABLET, FILM COATED ORAL
Qty: 30 TABLET | Refills: 3 | Status: SHIPPED | OUTPATIENT
Start: 2021-08-23 | End: 2021-08-24 | Stop reason: SDUPTHER

## 2021-08-23 RX ORDER — LOSARTAN POTASSIUM 50 MG/1
50 TABLET ORAL DAILY
Qty: 90 TABLET | Refills: 1 | Status: SHIPPED | OUTPATIENT
Start: 2021-08-23 | End: 2021-08-24 | Stop reason: SDUPTHER

## 2021-08-24 ENCOUNTER — OFFICE VISIT (OUTPATIENT)
Dept: FAMILY MEDICINE CLINIC | Age: 76
End: 2021-08-24
Payer: MEDICARE

## 2021-08-24 VITALS
HEIGHT: 62 IN | TEMPERATURE: 97.6 F | DIASTOLIC BLOOD PRESSURE: 70 MMHG | OXYGEN SATURATION: 94 % | HEART RATE: 78 BPM | WEIGHT: 84.6 LBS | SYSTOLIC BLOOD PRESSURE: 116 MMHG | BODY MASS INDEX: 15.57 KG/M2

## 2021-08-24 DIAGNOSIS — E53.8 B12 DEFICIENCY: ICD-10-CM

## 2021-08-24 DIAGNOSIS — E83.42 HYPOMAGNESEMIA: ICD-10-CM

## 2021-08-24 DIAGNOSIS — F41.9 ANXIETY: Primary | ICD-10-CM

## 2021-08-24 DIAGNOSIS — F32.A DEPRESSION, UNSPECIFIED DEPRESSION TYPE: ICD-10-CM

## 2021-08-24 DIAGNOSIS — I10 ESSENTIAL HYPERTENSION: ICD-10-CM

## 2021-08-24 DIAGNOSIS — K86.89 PANCREATIC INSUFFICIENCY: ICD-10-CM

## 2021-08-24 DIAGNOSIS — G45.9 TRANSIENT CEREBRAL ISCHEMIA, UNSPECIFIED TYPE: ICD-10-CM

## 2021-08-24 DIAGNOSIS — R29.898 XIPHOID PROMINENCE: ICD-10-CM

## 2021-08-24 DIAGNOSIS — J44.9 CHRONIC OBSTRUCTIVE PULMONARY DISEASE, UNSPECIFIED COPD TYPE (HCC): ICD-10-CM

## 2021-08-24 DIAGNOSIS — L89.151 PRESSURE INJURY OF SACRAL REGION, STAGE 1: ICD-10-CM

## 2021-08-24 DIAGNOSIS — K21.9 GASTROESOPHAGEAL REFLUX DISEASE, UNSPECIFIED WHETHER ESOPHAGITIS PRESENT: ICD-10-CM

## 2021-08-24 DIAGNOSIS — Z76.0 MEDICATION REFILL: ICD-10-CM

## 2021-08-24 PROCEDURE — 96372 THER/PROPH/DIAG INJ SC/IM: CPT | Performed by: FAMILY MEDICINE

## 2021-08-24 PROCEDURE — 99214 OFFICE O/P EST MOD 30 MIN: CPT | Performed by: FAMILY MEDICINE

## 2021-08-24 RX ORDER — ALBUTEROL SULFATE 90 UG/1
AEROSOL, METERED RESPIRATORY (INHALATION)
Qty: 25.5 G | Refills: 2 | Status: SHIPPED | OUTPATIENT
Start: 2021-08-24 | End: 2022-03-01 | Stop reason: SDUPTHER

## 2021-08-24 RX ORDER — MENTHOL AND ZINC OXIDE .44; 20.625 G/100G; G/100G
OINTMENT TOPICAL DAILY
Qty: 1 TUBE | Refills: 1 | Status: SHIPPED | OUTPATIENT
Start: 2021-08-24 | End: 2021-08-31

## 2021-08-24 RX ORDER — LOSARTAN POTASSIUM 50 MG/1
50 TABLET ORAL DAILY
Qty: 90 TABLET | Refills: 1 | Status: SHIPPED | OUTPATIENT
Start: 2021-08-24 | End: 2022-04-22

## 2021-08-24 RX ORDER — MIRTAZAPINE 15 MG/1
TABLET, FILM COATED ORAL
Qty: 90 TABLET | Refills: 3 | Status: SHIPPED | OUTPATIENT
Start: 2021-08-24 | End: 2022-08-25

## 2021-08-24 RX ORDER — PROPRANOLOL HYDROCHLORIDE 40 MG/1
TABLET ORAL
Qty: 180 TABLET | Refills: 3 | Status: SHIPPED | OUTPATIENT
Start: 2021-08-24 | End: 2022-09-12

## 2021-08-24 RX ORDER — PANCRELIPASE 24000; 76000; 120000 [USP'U]/1; [USP'U]/1; [USP'U]/1
CAPSULE, DELAYED RELEASE PELLETS ORAL
Qty: 180 CAPSULE | Refills: 3 | Status: SHIPPED | OUTPATIENT
Start: 2021-08-24 | End: 2022-09-14

## 2021-08-24 RX ORDER — CYANOCOBALAMIN 1000 UG/ML
1000 INJECTION INTRAMUSCULAR; SUBCUTANEOUS ONCE
Status: COMPLETED | OUTPATIENT
Start: 2021-08-24 | End: 2021-08-24

## 2021-08-24 RX ORDER — CALCIUM CARBONATE/VITAMIN D3 500-10/5ML
400 LIQUID (ML) ORAL 3 TIMES DAILY
Qty: 270 CAPSULE | Refills: 3 | Status: SHIPPED | OUTPATIENT
Start: 2021-08-24 | End: 2022-08-04 | Stop reason: SDUPTHER

## 2021-08-24 RX ORDER — CLOPIDOGREL BISULFATE 75 MG/1
TABLET ORAL
Qty: 90 TABLET | Refills: 3 | Status: SHIPPED | OUTPATIENT
Start: 2021-08-24 | End: 2022-09-14

## 2021-08-24 RX ORDER — PANTOPRAZOLE SODIUM 40 MG/1
TABLET, DELAYED RELEASE ORAL
Qty: 90 TABLET | Refills: 3 | Status: SHIPPED | OUTPATIENT
Start: 2021-08-24 | End: 2022-09-14

## 2021-08-24 RX ORDER — CLONAZEPAM 0.5 MG/1
TABLET ORAL
Qty: 30 TABLET | Refills: 2 | Status: SHIPPED | OUTPATIENT
Start: 2021-08-24 | End: 2021-11-29

## 2021-08-24 RX ADMIN — CYANOCOBALAMIN 1000 MCG: 1000 INJECTION INTRAMUSCULAR; SUBCUTANEOUS at 14:43

## 2021-08-24 NOTE — PROGRESS NOTES
Chaperone for Intimate Exam   Chaperone was offered and accepted as part of the rooming process.    Chaperone: Mine Chin CMA

## 2021-08-24 NOTE — PROGRESS NOTES
Chief Complaint   Patient presents with    Mass     growth on chest, would like female to be in rrom when looked at    [facility]     needs b-12    Other     sore on buttocks from sitting in chair, using antibiotics creams on it     Health Maintenance     declined colon and lung ct        HPI:  Mikhail Jung is a 77 y.o. female     Follow up    Issues per CC    Prominent lump under sternum  Always had it, seems bigger    Very thin patient  Used to be heavier    She has a spot on sacral area she wants checked    KRISTOFER Albright, was in room for exam of sacral area       Ongoing leg pain/soreness    Weight stable, but not gaining  Eating, but not much     b12 shot       Wt Readings from Last 3 Encounters:   08/24/21 84 lb 9.6 oz (38.4 kg)   07/19/21 87 lb (39.5 kg)   05/24/21 87 lb 6.4 oz (39.6 kg)     Declining screens  Compliant with meds for HTN    GERD, on protonix  COPD, uses albuterol    plavix for reported hx of TIA    She had ileojejunal bypass in 1978            Wt Readings from Last 3 Encounters:   08/24/21 84 lb 9.6 oz (38.4 kg)   07/19/21 87 lb (39.5 kg)   05/24/21 87 lb 6.4 oz (39.6 kg)         Patient Active Problem List   Diagnosis    Hypertensive disorder    Venous tributary (branch) occlusion of retina    Cortical senile cataract    Macular degeneration, age related, nonexudative    Nuclear senile cataract    Gastroesophageal reflux disease    Chronic obstructive pulmonary disease ([address]    History of disease    Cobalamin deficiency    Anxiety    Chronic obstructive lung disease ([address]    COPD exacerbation (HCC)    Postmenopausal bleeding    Shortness of breath    Tobacco abuse       Current Outpatient Medications   Medication Sig Dispense Refill    clonazePAM (KLONOPIN) 0.5 MG tablet TAKE 1 TABLET NIGHTLY AS NEEDED FOR ANXIETY 30 tablet 2    clopidogrel (PLAVIX) 75 MG tablet TAKE 1 TABLET DAILY 90 tablet 3    lipase-protease-amylase (CREON) 36411-82671 units delayed release capsule TAKE 1 CAPSULE TWICE A  capsule 3    losartan (COZAAR) 50 MG tablet Take 1 tablet by mouth daily 90 tablet 1    Magnesium Oxide 400 MG CAPS Take 400 mg by mouth 3 times daily 270 capsule 3    mirtazapine (REMERON) 15 MG tablet TAKE 1 TABLET BY MOUTH EVERY NIGHT 90 tablet 3    pantoprazole (PROTONIX) 40 MG tablet TAKE 1 TABLET DAILY 90 tablet 3    propranolol (INDERAL) 40 MG tablet TAKE 1 TABLET TWICE A DAY (REPLACES PREVIOUS PRESCRIPTION) 180 tablet 3    tiotropium-olodaterol (STIOLTO) 2.5-2.5 MCG/ACT AERS Inhale 2 puffs into the lungs daily 3 Inhaler 3    albuterol sulfate  (90 Base) MCG/ACT inhaler USE 2 INHALATIONS EVERY 6 HOURS AS NEEDED FOR WHEEZING 25.5 g 2    menthol-zinc oxide (CALMOSEPTINE) 0.44-20.625 % OINT ointment Apply topically daily for 7 days Max 30 ml per day.  1 Tube 1    clotrimazole-betamethasone (LOTRISONE) 1-0.05 % cream APPLY TOPICALLY TWICE A DAY 45 g 14    Multiple Vitamins-Minerals (MULTI FOR HER 50+) TABS Take 1 tablet by mouth daily 90 tablet 1    metroNIDAZOLE (FLAGYL) 500 MG tablet TAKE 1 TABLET THREE TIMES A DAY FOR 10 DAYS PER MONTH (Patient not taking: Reported on 8/24/2021) 90 tablet 3    Multiple Vitamins-Minerals (THERAPEUTIC MULTIVITAMIN-MINERALS) tablet Take 1 tablet by mouth daily 90 tablet 3     Current Facility-Administered Medications   Medication Dose Route Frequency Provider Last Rate Last Admin    cyanocobalamin injection 1,000 mcg  1,000 mcg Intramuscular Once Greg Parkinson MD             Past Medical History:   Diagnosis Date    Bowel disease     Cataract     Chronic back pain     COPD (chronic obstructive pulmonary disease) (HCC)     Dementia (HCC)     Dizziness and giddiness     GERD (gastroesophageal reflux disease)     Glaucoma     Hypertension     Osteoarthritis     Pancreatitis     Seizures (HCC)     TIA (transient ischemic attack)      Past Surgical History:   Procedure Laterality Date    APPENDECTOMY      CHOLECYSTECTOMY      INTESTINAL BYPASS      jejunoileal     Family History   Problem Relation Age of Onset    Arthritis Other     Heart Disease Other     High Blood Pressure Other     Kidney Disease Other     Other Other         respiratory problems    Breast Cancer Neg Hx     Colon Cancer Neg Hx     Diabetes Neg Hx     Cancer Neg Hx     Eclampsia Neg Hx     Ovarian Cancer Neg Hx     Hypertension Neg Hx      Labor Neg Hx     Spont Abortions Neg Hx     Stroke Neg Hx      Social History     Socioeconomic History    Marital status:      Spouse name: None    Number of children: None    Years of education: None    Highest education level: None   Occupational History    None   Tobacco Use    Smoking status: Current Every Day Smoker     Packs/day: 3.00     Years: 54.00     Pack years: 162.00     Types: Cigarettes    Smokeless tobacco: Never Used   Substance and Sexual Activity    Alcohol use: No     Alcohol/week: 0.0 standard drinks    Drug use: No    Sexual activity: Not Currently     Partners: Male     Comment:    Other Topics Concern    None   Social History Narrative    None     Social Determinants of Health     Financial Resource Strain: Low Risk     Difficulty of Paying Living Expenses: Not hard at all   Food Insecurity: No Food Insecurity    Worried About Running Out of Food in the Last Year: Never true    Nirav of Food in the Last Year: Never true   Transportation Needs: No Transportation Needs    Lack of Transportation (Medical): No    Lack of Transportation (Non-Medical):  No   Physical Activity:     Days of Exercise per Week:     Minutes of Exercise per Session:    Stress:     Feeling of Stress :    Social Connections:     Frequency of Communication with Friends and Family:     Frequency of Social Gatherings with Friends and Family:     Attends Buddhist Services:     Active Member of Clubs or Organizations:     Attends Club or Organization Meetings:     Marital Status: Intimate Partner Violence:     Fear of Current or Ex-Partner:     Emotionally Abused:     Physically Abused:     Sexually Abused: Allergies   Allergen Reactions    Aspirin      Bothers stomach    Penicillins      Unable to take    Adhesive Tape Rash     Patient states Silk Tape works the best       Review of Systems:   General ROS: grief  Respiratory ROS: improved SOB  Cardiovascular ROS: no chest pain or dyspnea on exertion  Gastrointestinal ROS: hx of ileojejunal bypass  Genito-Urinary ROS: no dysuria, trouble voiding  Musculoskeletal ROS: some pain in her lower legs  Neurological ROS: negative for - behavioral changes, memory loss, numbness/tingling, tremors or weakness    In general patient otherwise reports feeling well. Physical Exam:  /70 (Site: Right Upper Arm)   Pulse 78   Temp 97.6 °F (36.4 °C)   Ht 5' 2\" (1.575 m)   Wt 84 lb 9.6 oz (38.4 kg)   LMP  (LMP Unknown)   SpO2 94%   Breastfeeding No   BMI 15.47 kg/m²     Gen: Well, NAD, Alert, Oriented x 3  HEENT: EOMI, eyes clear, MMM  Skin: small stage 1 decub right sacral area, no real breakdown   Neck: no significant lymphadenopathy or thyromegaly  Lungs: exp wheeze   Heart: RRR, S1S2, w/out M/R/G, non-displaced PMI   Ext:trace pedal edema  Psych: she appears euthymic  Patient has BUE and LUE strength deficits of 4/5  Prominent xiphoid process, benign     Lab Results   Component Value Date    WBC 8.3 03/01/2021    HGB 13.3 03/01/2021    HCT 40.3 03/01/2021     03/01/2021    CHOL 118 12/08/2017    TRIG 235 (H) 12/08/2017    HDL 26 (L) 12/08/2017    ALT <5 03/01/2021    AST <5 03/01/2021     04/27/2021    K 4.7 04/27/2021     (H) 04/27/2021    CREATININE 1.18 (H) 04/27/2021    BUN 30 (H) 04/27/2021    CO2 22 04/27/2021    TSH 4.010 (H) 03/01/2021    INR 0.9 11/15/2019         A&P   Diagnosis Orders   1. Anxiety  clonazePAM (KLONOPIN) 0.5 MG tablet   2.  Medication refill  clopidogrel (PLAVIX) 75 MG tablet pantoprazole (PROTONIX) 40 MG tablet    propranolol (INDERAL) 40 MG tablet   3. Transient cerebral ischemia, unspecified type  clopidogrel (PLAVIX) 75 MG tablet   4. Pancreatic insufficiency  lipase-protease-amylase (CREON) 79619-60243 units delayed release capsule   5. Hypomagnesemia  Magnesium Oxide 400 MG CAPS   6. Depression, unspecified depression type  mirtazapine (REMERON) 15 MG tablet   7. Gastroesophageal reflux disease  pantoprazole (PROTONIX) 40 MG tablet   8. Essential hypertension  propranolol (INDERAL) 40 MG tablet   9. Chronic obstructive pulmonary disease, unspecified COPD type ([address]  tiotropium-olodaterol (STIOLTO) 2.5-2.5 MCG/ACT AERS    albuterol sulfate  (90 Base) MCG/ACT inhaler   10. Xiphoid prominence     11. Pressure injury of sacral region, stage 1  menthol-zinc oxide (CALMOSEPTINE) 0.44-20.625 % OINT ointment   12. B12 deficiency  cyanocobalamin injection 1,000 mcg     Refills given     Refuses screens    Refuses covid vaccine     Klonopin on going    Not taking aricept    Living with daughter    calmoseptine    b12 shot     Reassurance about xiphoid (normal anatomy)    Wt Readings from Last 3 Encounters:   08/24/21 84 lb 9.6 oz (38.4 kg)   07/19/21 87 lb (39.5 kg)   05/24/21 87 lb 6.4 oz (39.6 kg)           Patient is only able to use walker for short distances with increased risk of falling  Given hand  and upper and lower extremity weakness, she would benefit from the use of a transport chair within the home and in going from the car to doctor's appointments and other errands.      She is unable to safely use only a cane due to extremity weakness    Neville Jefferson MD        Moderate MDM

## 2021-10-18 ENCOUNTER — TELEPHONE (OUTPATIENT)
Dept: FAMILY MEDICINE CLINIC | Age: 76
End: 2021-10-18

## 2021-10-19 ENCOUNTER — NURSE TRIAGE (OUTPATIENT)
Dept: OTHER | Facility: CLINIC | Age: 76
End: 2021-10-19

## 2021-10-19 NOTE — TELEPHONE ENCOUNTER
Received call from [facility] with Red Flag Complaint. Brief description of triage: Call from [facility] ([facility]), stated that patient had disconnected. States son, Mo Neri had called in to get his mother's annual wellness check and had mentioned that she had copd. The ECC contacted triage. Returned call to Lesia Ellison and he stated he already had an appointment for his mom. Triage indicates for patient to Did not triage. Care advice provided, patient verbalizes understanding; denies any other questions or concerns; instructed to call back for any new or worsening symptoms. Attention Provider: Thank you for allowing me to participate in the care of your patient. The patient was connected to triage in response to information provided to the ECC/PSC. Please do not respond through this encounter as the response is not directed to a shared pool.

## 2021-10-21 ENCOUNTER — OFFICE VISIT (OUTPATIENT)
Dept: PULMONOLOGY | Age: 76
End: 2021-10-21
Payer: MEDICARE

## 2021-10-21 VITALS
OXYGEN SATURATION: 97 % | HEART RATE: 87 BPM | DIASTOLIC BLOOD PRESSURE: 96 MMHG | HEIGHT: 62 IN | BODY MASS INDEX: 15.83 KG/M2 | WEIGHT: 86 LBS | SYSTOLIC BLOOD PRESSURE: 149 MMHG

## 2021-10-21 DIAGNOSIS — J44.9 CHRONIC OBSTRUCTIVE PULMONARY DISEASE, UNSPECIFIED COPD TYPE (HCC): Primary | ICD-10-CM

## 2021-10-21 DIAGNOSIS — Z72.0 TOBACCO ABUSE: ICD-10-CM

## 2021-10-21 DIAGNOSIS — R06.02 SHORTNESS OF BREATH: ICD-10-CM

## 2021-10-21 PROCEDURE — 99214 OFFICE O/P EST MOD 30 MIN: CPT | Performed by: INTERNAL MEDICINE

## 2021-10-21 ASSESSMENT — ENCOUNTER SYMPTOMS
VOMITING: 0
DIARRHEA: 0
CHEST TIGHTNESS: 0
SHORTNESS OF BREATH: 1
SINUS PRESSURE: 0
NAUSEA: 0
RHINORRHEA: 0
EYE ITCHING: 0
WHEEZING: 1
ABDOMINAL PAIN: 0
VOICE CHANGE: 0
SORE THROAT: 0
EYE DISCHARGE: 0
TROUBLE SWALLOWING: 0
COUGH: 1

## 2021-10-21 NOTE — PROGRESS NOTES
Subjective:     Shaw Garnett is a 77 y.o. female who complains today of:     Chief Complaint   Patient presents with    COPD     3 month f/u       HPI  She is using bronchodilator with albuterol neb and albuterol HFA and stiolto respimat 2 puff in AM   She is still smoking 2 ppd   C/o shortness of breath , worse with exertion. On and off  Wheezing. C/o Cough with  Clear Sputum. No Hemoptysis. No Chest tightness. No Chest pain with radiation  or pleuritic pain. No  leg edema. No orthopnea. No Fever or chills. No Rhinorrhea and postnasal drip.     Allergies:  Aspirin, Penicillins, and Adhesive tape  Past Medical History:   Diagnosis Date    Bowel disease     Cataract     Chronic back pain     COPD (chronic obstructive pulmonary disease) (HCC)     Dementia (HCC)     Dizziness and giddiness     GERD (gastroesophageal reflux disease)     Glaucoma     Hypertension     Osteoarthritis     Pancreatitis     Seizures (HCC)     TIA (transient ischemic attack)      Past Surgical History:   Procedure Laterality Date    APPENDECTOMY      CHOLECYSTECTOMY      INTESTINAL BYPASS      jejunoileal     Family History   Problem Relation Age of Onset    Arthritis Other     Heart Disease Other     High Blood Pressure Other     Kidney Disease Other     Other Other         respiratory problems    Breast Cancer Neg Hx     Colon Cancer Neg Hx     Diabetes Neg Hx     Cancer Neg Hx     Eclampsia Neg Hx     Ovarian Cancer Neg Hx     Hypertension Neg Hx      Labor Neg Hx     Spont Abortions Neg Hx     Stroke Neg Hx      Social History     Socioeconomic History    Marital status:      Spouse name: Not on file    Number of children: Not on file    Years of education: Not on file    Highest education level: Not on file   Occupational History    Not on file   Tobacco Use    Smoking status: Current Every Day Smoker     Packs/day: 3.00     Years: 54.00     Pack years: 162.00     Types: Cigarettes    Smokeless tobacco: Never Used   Substance and Sexual Activity    Alcohol use: No     Alcohol/week: 0.0 standard drinks    Drug use: No    Sexual activity: Not Currently     Partners: Male     Comment:    Other Topics Concern    Not on file   Social History Narrative    Not on file     Social Determinants of Health     Financial Resource Strain: Low Risk     Difficulty of Paying Living Expenses: Not hard at all   Food Insecurity: No Food Insecurity    Worried About Running Out of Food in the Last Year: Never true    Nirav of Food in the Last Year: Never true   Transportation Needs: No Transportation Needs    Lack of Transportation (Medical): No    Lack of Transportation (Non-Medical): No   Physical Activity:     Days of Exercise per Week:     Minutes of Exercise per Session:    Stress:     Feeling of Stress :    Social Connections:     Frequency of Communication with Friends and Family:     Frequency of Social Gatherings with Friends and Family:     Attends Sabianism Services:     Active Member of Clubs or Organizations:     Attends Club or Organization Meetings:     Marital Status:    Intimate Partner Violence:     Fear of Current or Ex-Partner:     Emotionally Abused:     Physically Abused:     Sexually Abused:          Review of Systems   Constitutional: Negative for chills, diaphoresis, fatigue and fever. HENT: Negative for congestion, mouth sores, nosebleeds, postnasal drip, rhinorrhea, sinus pressure, sneezing, sore throat, trouble swallowing and voice change. Eyes: Negative for discharge, itching and visual disturbance. Respiratory: Positive for cough, shortness of breath and wheezing. Negative for chest tightness. Cardiovascular: Negative for chest pain, palpitations and leg swelling. Gastrointestinal: Negative for abdominal pain, diarrhea, nausea and vomiting. Genitourinary: Negative for difficulty urinating and hematuria.    Musculoskeletal: Negative for arthralgias, joint swelling and myalgias. Skin: Negative for rash. Allergic/Immunologic: Negative for environmental allergies and food allergies. Neurological: Negative for dizziness, tremors, weakness and headaches. Psychiatric/Behavioral: Negative for behavioral problems and sleep disturbance.         :     Vitals:    10/21/21 1347 10/21/21 1352   BP: (!) 152/97 (!) 149/96   Site: Right Upper Arm Left Upper Arm   Position: Sitting Sitting   Cuff Size: Child Child   Pulse: 87    SpO2: 97%    Weight: 86 lb (39 kg)    Height: 5' 2\" (1.575 m)      Wt Readings from Last 3 Encounters:   10/21/21 86 lb (39 kg)   08/24/21 84 lb 9.6 oz (38.4 kg)   07/19/21 87 lb (39.5 kg)         Physical Exam  Constitutional:       General: She is not in acute distress. Appearance: She is well-developed. She is not diaphoretic. HENT:      Head: Normocephalic and atraumatic. Nose: Nose normal.   Eyes:      Pupils: Pupils are equal, round, and reactive to light. Neck:      Thyroid: No thyromegaly. Vascular: No JVD. Trachea: No tracheal deviation. Cardiovascular:      Rate and Rhythm: Normal rate and regular rhythm. Heart sounds: No murmur heard. No friction rub. No gallop. Pulmonary:      Effort: No respiratory distress. Breath sounds: Wheezing present. No rales. Comments: diminished Breath sound bilaterally. Chest:      Chest wall: No tenderness. Abdominal:      General: There is no distension. Tenderness: There is no abdominal tenderness. There is no rebound. Musculoskeletal:         General: Normal range of motion. Lymphadenopathy:      Cervical: No cervical adenopathy. Skin:     General: Skin is warm and dry. Neurological:      Mental Status: She is alert and oriented to person, place, and time.       Coordination: Coordination normal.         Current Outpatient Medications   Medication Sig Dispense Refill    clonazePAM (KLONOPIN) 0.5 MG tablet TAKE 1 TABLET NIGHTLY AS NEEDED FOR ANXIETY 30 tablet 2    clopidogrel (PLAVIX) 75 MG tablet TAKE 1 TABLET DAILY 90 tablet 3    lipase-protease-amylase (CREON) 21027-95885 units delayed release capsule TAKE 1 CAPSULE TWICE A  capsule 3    losartan (COZAAR) 50 MG tablet Take 1 tablet by mouth daily 90 tablet 1    Magnesium Oxide 400 MG CAPS Take 400 mg by mouth 3 times daily 270 capsule 3    mirtazapine (REMERON) 15 MG tablet TAKE 1 TABLET BY MOUTH EVERY NIGHT 90 tablet 3    pantoprazole (PROTONIX) 40 MG tablet TAKE 1 TABLET DAILY 90 tablet 3    propranolol (INDERAL) 40 MG tablet TAKE 1 TABLET TWICE A DAY (REPLACES PREVIOUS PRESCRIPTION) 180 tablet 3    tiotropium-olodaterol (STIOLTO) 2.5-2.5 MCG/ACT AERS Inhale 2 puffs into the lungs daily 3 Inhaler 3    albuterol sulfate  (90 Base) MCG/ACT inhaler USE 2 INHALATIONS EVERY 6 HOURS AS NEEDED FOR WHEEZING 25.5 g 2    clotrimazole-betamethasone (LOTRISONE) 1-0.05 % cream APPLY TOPICALLY TWICE A DAY 45 g 14    Multiple Vitamins-Minerals (MULTI FOR HER 50+) TABS Take 1 tablet by mouth daily 90 tablet 1    metroNIDAZOLE (FLAGYL) 500 MG tablet TAKE 1 TABLET THREE TIMES A DAY FOR 10 DAYS PER MONTH 90 tablet 3    Multiple Vitamins-Minerals (THERAPEUTIC MULTIVITAMIN-MINERALS) tablet Take 1 tablet by mouth daily 90 tablet 3     No current facility-administered medications for this visit. Results for orders placed in visit on 04/16/21    XR CHEST STANDARD (2 VW)    Narrative  DIAGNOSIS:J44.9 Chronic obstructive pulmonary disease, unspecified COPD type ([facility] ICD10    COMPARISON: March 24, 2020; May 8, 2020    TECHNIQUE: PA and lateral chest    FINDINGS: The lungs are hyperlucent inflated and there is coarsening of the interstitium. The costophrenic angle is again blunted which may represents chronic scarring. Biapical pleural thickening is again seen. The lungs contain no focal infiltrate,  consolidation or pneumothorax.  The cardiac silhouette is not enlarged. Degenerative changes are seen at multiple levels in the dorsal spine. .    Impression  Findings are suggestive of COPD      Results for orders placed during the hospital encounter of 05/08/20    XR CHEST STANDARD (2 VW)    Narrative  XR CHEST (2 VW)    Exam Date/Time:  5/8/2020 2:12 PM  Clinical History:  J44.1 COPD exacerbation (HCC) ICD10  shorts of breath, difficulty breathing, weight loss, history of COPD. Comparison:  3/23/2020    RESULT:        Lungs and pleura:  Emphysematous changes. No focal consolidation. Biapical pleural-parenchymal scarring. Scarring at the lung bases. No pleural effusion. No pneumothorax. Normal pulmonary vascular pattern. Cardiomediastinal silhouette:  Normal.    Other:  No acute osseous findings. Surgical clip upper abdomen near midline. Impression  No acute radiographic abnormality. Emphysema. Results for orders placed during the hospital encounter of 05/19/15    XR Chest Standard TWO VW    Narrative  EXAMINATION TWO VIEW CHEST - PA/LATERAL    CLINICAL HISTORY CHEST PAIN. COMPARISONS October 4, 2014. FINDINGS The cardiac and mediastinal silhouette is normal.  Lungs are  clear. No pleural effusion or pneumothorax. Bones are intact. IMPRESSION    NO ACUTE CARDIOPULMONARY DISEASE. Armond Arrow By- Tom French M.D. Released By- Tom French M.D. Released Date Time- 05/21/15 0606  This document has been electronically signed. ------------------------------------------------------------------------------  ]  Results for orders placed during the hospital encounter of 03/23/20    XR CHEST PORTABLE    Narrative  EXAMINATION: PORTABLE CHEST X-RAY FROM 3/23/2020 AT 17:52 HOURS    CLINICAL HISTORY: SMOKING HISTORY AND INTERMITTENT DYSPNEA X2 WEEKS    COMPARISONS: 5/19/2015    FINDINGS: The heart is not enlarged. There is mild atherosclerotic calcification and tortuosity of the aorta. There is hyperinflation of the lungs suggesting COPD. There is blunting of the right CP angle similar to the prior chest film and this likely  represents pleural scarring in the right lung base. No acute pulmonary infiltrates or pleural effusions. No pneumothorax. There is degenerative bone spurring in the spine. Impression  1. NO RADIOGRAPHIC EVIDENCE OF ACTIVE DISEASE IN THE CHEST. 2. HYPERINFLATION OF THE LUNGS SUGGESTIVE OF COPD.  ]  No results found for this or any previous visit.  ]  Results for orders placed during the hospital encounter of 03/23/20    CT CHEST WO CONTRAST    Narrative  CT of the Chest is contrast medium. History:  55 year smoker. Complains weight loss the. Rule out mass. Technical Factors:    CT imaging of the chest was obtained and formatted as 5 mm contiguous axial images from the thoracic inlet through the adrenal glands. Sagittal coronal reconstruction obtained during postprocessing. Intravenous contrast medium:  None    Comparison:  Chest radiograph March 23, 2020, May 19, 2015. Findings:    Bilateral diffuse emphysematous change. No nodules, and no masses bilaterally. Scant dependent subsegmental atelectatic change posterior right lung base. No pleural effusion. No hilar, mediastinal, or axillary lymph node enlargement. Thoracic aorta normal in course and caliber. Cardiac size normal. No pericardial effusion. Coronary calcification demonstrated. Limited imaging upper abdomen shows no gross anomalies. No osteoblastic, and no osteolytic lesions. Diffuse anterior osteophytes thoracic spine. Impression  Emphysema. No lung masses identified. All CT scans at this facility use dose modulation, iterative reconstruction, and/or weight based dosing when appropriate to reduce radiation dose to as low as reasonably achievable. Assessment/Plan:     1.  Chronic obstructive pulmonary disease, unspecified COPD type ([address]  She is using bronchodilator with albuterol neb and albuterol HFA and stiolto respimat 2 puff in AM She is still smoking 2 ppd C/o shortness of breath , worse with exertion. On and off  Wheezing. C/o Cough with  Clear Sputum. Continue same , she need new tubing for nebulizer . 2. Shortness of breath  She having  Chronic shortness of breath which is likely due to COPD, continue  Bronchodilator, as before. keep  Spo2 90% or above. 3. Tobacco abuse  She is advised try to quit smoking. Risks related to smoking explained to the patient. Different ways to help to quit smoking were discussed today. She is smoking 2 ppd       Return in about 4 months (around 2/21/2022) for COPD.       Yolanda Saul MD

## 2021-11-04 RX ORDER — UBIDECARENONE 30 MG
1 CAPSULE ORAL DAILY
Qty: 90 TABLET | Refills: 1 | Status: SHIPPED | OUTPATIENT
Start: 2021-11-04 | End: 2022-05-04

## 2021-11-12 ENCOUNTER — OFFICE VISIT (OUTPATIENT)
Dept: FAMILY MEDICINE CLINIC | Age: 76
End: 2021-11-12
Payer: MEDICARE

## 2021-11-12 VITALS
BODY MASS INDEX: 14.18 KG/M2 | HEIGHT: 63 IN | SYSTOLIC BLOOD PRESSURE: 122 MMHG | WEIGHT: 80 LBS | OXYGEN SATURATION: 95 % | HEART RATE: 109 BPM | DIASTOLIC BLOOD PRESSURE: 64 MMHG

## 2021-11-12 DIAGNOSIS — Z23 NEEDS FLU SHOT: ICD-10-CM

## 2021-11-12 DIAGNOSIS — E53.8 B12 DEFICIENCY: ICD-10-CM

## 2021-11-12 DIAGNOSIS — Z00.00 ROUTINE GENERAL MEDICAL EXAMINATION AT A HEALTH CARE FACILITY: Primary | ICD-10-CM

## 2021-11-12 PROCEDURE — G0439 PPPS, SUBSEQ VISIT: HCPCS | Performed by: FAMILY MEDICINE

## 2021-11-12 PROCEDURE — G0008 ADMIN INFLUENZA VIRUS VAC: HCPCS | Performed by: FAMILY MEDICINE

## 2021-11-12 PROCEDURE — 90694 VACC AIIV4 NO PRSRV 0.5ML IM: CPT | Performed by: FAMILY MEDICINE

## 2021-11-12 PROCEDURE — 96372 THER/PROPH/DIAG INJ SC/IM: CPT | Performed by: FAMILY MEDICINE

## 2021-11-12 RX ORDER — CYANOCOBALAMIN 1000 UG/ML
1000 INJECTION INTRAMUSCULAR; SUBCUTANEOUS ONCE
Status: COMPLETED | OUTPATIENT
Start: 2021-11-12 | End: 2021-11-12

## 2021-11-12 RX ADMIN — CYANOCOBALAMIN 1000 MCG: 1000 INJECTION INTRAMUSCULAR; SUBCUTANEOUS at 15:54

## 2021-11-12 ASSESSMENT — PATIENT HEALTH QUESTIONNAIRE - PHQ9
1. LITTLE INTEREST OR PLEASURE IN DOING THINGS: 1
SUM OF ALL RESPONSES TO PHQ9 QUESTIONS 1 & 2: 2
SUM OF ALL RESPONSES TO PHQ QUESTIONS 1-9: 2
SUM OF ALL RESPONSES TO PHQ QUESTIONS 1-9: 2
2. FEELING DOWN, DEPRESSED OR HOPELESS: 1
SUM OF ALL RESPONSES TO PHQ QUESTIONS 1-9: 2

## 2021-11-12 NOTE — PATIENT INSTRUCTIONS
Personalized Preventive Plan for Katerine Morales - 11/12/2021  Medicare offers a range of preventive health benefits. Some of the tests and screenings are paid in full while other may be subject to a deductible, co-insurance, and/or copay. Some of these benefits include a comprehensive review of your medical history including lifestyle, illnesses that may run in your family, and various assessments and screenings as appropriate. After reviewing your medical record and screening and assessments performed today your provider may have ordered immunizations, labs, imaging, and/or referrals for you. A list of these orders (if applicable) as well as your Preventive Care list are included within your After Visit Summary for your review. Other Preventive Recommendations:    · A preventive eye exam performed by an eye specialist is recommended every 1-2 years to screen for glaucoma; cataracts, macular degeneration, and other eye disorders. · A preventive dental visit is recommended every 6 months. · Try to get at least 150 minutes of exercise per week or 10,000 steps per day on a pedometer . · Order or download the FREE \"Exercise & Physical Activity: Your Everyday Guide\" from [employer]. Call 4-116.342.6098 or search [employer] online. · You need 2405-2478 mg of calcium and 9641-9422 IU of vitamin D per day. It is possible to meet your calcium requirement with diet alone, but a vitamin D supplement is usually necessary to meet this goal.  · When exposed to the sun, use a sunscreen that protects against both UVA and UVB radiation with an SPF of 30 or greater. Reapply every 2 to 3 hours or after sweating, drying off with a towel, or swimming. · Always wear a seat belt when traveling in a car. Always wear a helmet when riding a bicycle or motorcycle.

## 2021-11-12 NOTE — PROGRESS NOTES
Medicare Annual Wellness Visit  Name: Iftikhar Munoz Date: 2021   MRN: [id number] Sex: Female   Age: 77 y.o. Ethnicity: Non- / Non    : 1945 Race: White (non-)      Orly Groves is here for Medicare AWV (b12 shot,flu shot ) and Health Maintenance (pt declined colon cancer screening, will think about CT Lung screen )    Screenings for behavioral, psychosocial and functional/safety risks, and cognitive dysfunction are all negative except as indicated below. These results, as well as other patient data from the [address] form, are documented in Flowsheets linked to this Encounter. Wt Readings from Last 3 Encounters:   21 80 lb (36.3 kg)   10/21/21 86 lb (39 kg)   21 84 lb 9.6 oz (38.4 kg)         Allergies   Allergen Reactions    Aspirin      Bothers stomach    Penicillins      Unable to take    Adhesive Tape Rash     Patient states Silk Tape works the best         Prior to Visit Medications    Medication Sig Taking?  Authorizing Provider   Multiple Vitamins-Minerals (MULTI FOR HER 50+) TABS Take 1 tablet by mouth daily Yes Brooke Westfall MD   clonazePAM (KLONOPIN) 0.5 MG tablet TAKE 1 TABLET NIGHTLY AS NEEDED FOR ANXIETY Yes Brooke Westfall MD   clopidogrel (PLAVIX) 75 MG tablet TAKE 1 TABLET DAILY Yes Brooke Westfall MD   lipase-protease-amylase (CREON) 03241-95145 units delayed release capsule TAKE 1 CAPSULE TWICE A DAY Yes Brooke Westfall MD   losartan (COZAAR) 50 MG tablet Take 1 tablet by mouth daily Yes Brooke Westfall MD   Magnesium Oxide 400 MG CAPS Take 400 mg by mouth 3 times daily Yes Brooke Westfall MD   mirtazapine (REMERON) 15 MG tablet TAKE 1 TABLET BY MOUTH EVERY NIGHT Yes Brooke Westfall MD   pantoprazole (PROTONIX) 40 MG tablet TAKE 1 TABLET DAILY Yes Brooke Westfall MD   propranolol (INDERAL) 40 MG tablet TAKE 1 TABLET TWICE A DAY (REPLACES PREVIOUS PRESCRIPTION) Yes Brooke Westfall MD   tiotropium-olodaterol (STIOLTO) 2.5-2.5 MCG/ACT AERS Inhale 2 puffs into the lungs daily Yes Liset Osborn MD   albuterol sulfate  (90 Base) MCG/ACT inhaler USE 2 INHALATIONS EVERY 6 HOURS AS NEEDED FOR WHEEZING Yes Liset Osborn MD   clotrimazole-betamethasone (Kyler Jatin) 1-0.05 % cream APPLY TOPICALLY TWICE A DAY Yes Liset Osborn MD   metroNIDAZOLE (FLAGYL) 500 MG tablet TAKE 1 TABLET THREE TIMES A DAY FOR 10 DAYS PER MONTH Yes Liset Osborn MD   Multiple Vitamins-Minerals (THERAPEUTIC MULTIVITAMIN-MINERALS) tablet Take 1 tablet by mouth daily  Liset Osborn MD         Past Medical History:   Diagnosis Date    Bowel disease     Cataract     Chronic back pain     COPD (chronic obstructive pulmonary disease) (HCC)     Dementia (HCC)     Dizziness and giddiness     GERD (gastroesophageal reflux disease)     Glaucoma     Hypertension     Osteoarthritis     Pancreatitis     Seizures (HCC)     TIA (transient ischemic attack)        Past Surgical History:   Procedure Laterality Date    APPENDECTOMY      CHOLECYSTECTOMY      INTESTINAL BYPASS      jejunoileal         Family History   Problem Relation Age of Onset    Arthritis Other     Heart Disease Other     High Blood Pressure Other     Kidney Disease Other     Other Other         respiratory problems    Breast Cancer Neg Hx     Colon Cancer Neg Hx     Diabetes Neg Hx     Cancer Neg Hx     Eclampsia Neg Hx     Ovarian Cancer Neg Hx     Hypertension Neg Hx      Labor Neg Hx     Spont Abortions Neg Hx     Stroke Neg Hx        CareTeam (Including outside providers/suppliers regularly involved in providing care):   Patient Care Team:  Liset Osborn MD as PCP - General (Family Medicine)  Liset Osborn MD as PCP - [facility] Empaneled Provider  Teresa Mcnamara MD (General Surgery)    Wt Readings from Last 3 Encounters:   21 80 lb (36.3 kg)   10/21/21 86 lb (39 kg)   21 84 lb 9.6 oz (38.4 kg)     Vitals:    21 1458   BP: 122/64   Pulse: 109   SpO2: 95%   Weight: 80 lb (36.3 kg)   Height: 5' 3\" experienced any of the following? New or Increased Pain, New or Increased Fatigue, Loneliness, Social Isolation, Stress or Anger?: (!) Loneliness  Do you get the social and emotional support that you need?: Yes  Do you have a Living Will?: Yes  Advance Directives     Power of  Living Will ACP-Advance Directive ACP-Power of     Not on File Not on File Not on File Not on File      General Health Risk Interventions:  · Care coordination    Health Habits/Nutrition:  Health Habits/Nutrition  Do you exercise for at least 20 minutes 2-3 times per week?: Yes  Have you lost any weight without trying in the past 3 months?: (!) Yes  Do you eat only one meal per day?: No  Have you seen the dentist within the past year?: (!) No  Body mass index: (!) 14.17  Health Habits/Nutrition Interventions:  · Ongoing weight loss    Hearing/Vision:  No exam data present  Hearing/Vision  Do you or your family notice any trouble with your hearing that hasn't been managed with hearing aids?: (!) Yes  Do you have difficulty driving, watching TV, or doing any of your daily activities because of your eyesight?: (!) Yes  Have you had an eye exam within the past year?: (!) No  Hearing/Vision Interventions:  · Eye exam suggested    Safety:  Safety  Do you have working smoke detectors?: Yes  Have all throw rugs been removed or fastened?: Yes  Do you have non-slip mats or surfaces in all bathtubs/showers?: Yes  Do all of your stairways have a railing or banister?: (!) No  Are your doorways, halls and stairs free of clutter?: Yes  Do you always fasten your seatbelt when you are in a car?: Yes  Safety Interventions:  · Home safety tips provided    ADL:  ADLs  In the past 7 days, did you need help from others to perform any of the following everyday activities? Eating, dressing, grooming, bathing, toileting, or walking/balance?: None  In the past 7 days, did you need help from others to take care of any of the following?  Laundry, housekeeping, banking/finances, shopping, telephone use, food preparation, transportation, or taking medications?: [employer], Housekeeping, Laundry  ADL Interventions:  · family helps    Personalized Preventive Plan   Current Health Maintenance Status  Immunization History   Administered Date(s) Administered    Influenza, High Dose (Fluzone 65 yrs and older) 11/19/2015, 10/19/2016, 12/07/2017, 10/17/2018    Influenza, Quadv, adjuvanted, 65 yrs +, IM, PF (Fluad) 10/21/2020    Influenza, Triv, inactivated, subunit, adjuvanted, IM (Fluad 65 yrs and older) 10/18/2019    Pneumococcal Conjugate 13-valent ([id number]) 10/18/2019    Pneumococcal Polysaccharide ([id number]) 10/21/2020        Health Maintenance   Topic Date Due    COVID-19 Vaccine (1) Never done    DTaP/Tdap/Td vaccine (1 - Tdap) Never done    Colon cancer screen colonoscopy  Never done    Shingles Vaccine (1 of 2) Never done    Low dose CT lung screening  03/24/2021    Annual Wellness Visit (AWV)  06/03/2021    Flu vaccine (1) 09/01/2021    Potassium monitoring  04/27/2022    Creatinine monitoring  04/27/2022    Lipid screen  12/08/2022    Pneumococcal 65+ years Vaccine  Completed    Hepatitis C screen  Completed    DEXA (modify frequency per FRAX score)  Addressed    Hepatitis A vaccine  Aged Out    Hepatitis B vaccine  Aged Out    Hib vaccine  Aged Out    Meningococcal (ACWY) vaccine  Aged Out     Recommendations for [employer] Due: see orders and patient instructions/AVS.  . Recommended screening schedule for the next 5-10 years is provided to the patient in written form: see Patient Eduard Pineda was seen today for medicare awv and health maintenance.     Diagnoses and all orders for this visit:    Routine general medical examination at a health care facility    Needs flu shot  -     INFLUENZA, QUADV, ADJUVANTED, 65 YRS =, IM, PF, PREFILL SYR, 0.5ML (FLUAD)    B12 deficiency  -     cyanocobalamin injection 1,000 mcg               Needs continued encouragement to eat more    [place] MD Sandra

## 2022-02-26 DIAGNOSIS — F41.9 ANXIETY: ICD-10-CM

## 2022-02-26 NOTE — TELEPHONE ENCOUNTER
----- Message from Mitzi Rucker sent at 2/26/2022  9:24 AM EST -----  Subject: Refill Request    QUESTIONS  Name of Medication? clonazePAM (KLONOPIN) 0.5 MG tablet  Patient-reported dosage and instructions? 0.5  How many days do you have left? 0  [id number]  Pharmacy phone number (if available)? [phone]  ---------------------------------------------------------------------------  --------------  Josue LLANES  What is the best way for the office to contact you? OK to leave message on   voicemail  Preferred Call Back Phone Number?  [phone]

## 2022-02-28 RX ORDER — CLONAZEPAM 0.5 MG/1
TABLET ORAL
Qty: 30 TABLET | Refills: 2 | Status: SHIPPED | OUTPATIENT
Start: 2022-02-28 | End: 2022-05-13 | Stop reason: SDUPTHER

## 2022-03-01 ENCOUNTER — TELEMEDICINE (OUTPATIENT)
Dept: PULMONOLOGY | Age: 77
End: 2022-03-01
Payer: MEDICARE

## 2022-03-01 DIAGNOSIS — Z72.0 TOBACCO ABUSE: ICD-10-CM

## 2022-03-01 DIAGNOSIS — R06.02 SHORTNESS OF BREATH: ICD-10-CM

## 2022-03-01 DIAGNOSIS — J44.9 CHRONIC OBSTRUCTIVE PULMONARY DISEASE, UNSPECIFIED COPD TYPE (HCC): Primary | ICD-10-CM

## 2022-03-01 PROCEDURE — 99443 PR PHYS/QHP TELEPHONE EVALUATION 21-30 MIN: CPT | Performed by: INTERNAL MEDICINE

## 2022-03-01 RX ORDER — ALBUTEROL SULFATE 90 UG/1
AEROSOL, METERED RESPIRATORY (INHALATION)
Qty: 3 EACH | Refills: 3 | Status: SHIPPED | OUTPATIENT
Start: 2022-03-01 | End: 2022-04-20

## 2022-03-01 ASSESSMENT — ENCOUNTER SYMPTOMS
EYE DISCHARGE: 0
SHORTNESS OF BREATH: 1
DIARRHEA: 0
VOICE CHANGE: 0
NAUSEA: 0
ABDOMINAL PAIN: 0
COUGH: 1
CHEST TIGHTNESS: 0
SORE THROAT: 0
SINUS PRESSURE: 0
TROUBLE SWALLOWING: 0
EYE ITCHING: 0
WHEEZING: 1
VOMITING: 0
RHINORRHEA: 0

## 2022-03-01 NOTE — PROGRESS NOTES
Subjective:     Valentine Del Rosario is a 77 y.o. female who complains today of:     Chief Complaint   Patient presents with    COPD       HPI  She is using bronchodilator with albuterol neb and albuterol HFA and stiolto respimat 2 puff in AM.     C/o shortness of breath , worse with exertion. C/o  Wheezing. C/o Cough with  white Sputum. No Hemoptysis. No Chest tightness. No Chest pain with radiation  or pleuritic pain. No  leg edema. No orthopnea. No Fever or chills. No Rhinorrhea and postnasal drip. She is still smoking 2 ppd.       Allergies:  Aspirin, Penicillins, and Adhesive tape  Past Medical History:   Diagnosis Date    Bowel disease     Cataract     Chronic back pain     COPD (chronic obstructive pulmonary disease) (HCC)     Dementia (HCC)     Dizziness and giddiness     GERD (gastroesophageal reflux disease)     Glaucoma     Hypertension     Osteoarthritis     Pancreatitis     Seizures (HCC)     TIA (transient ischemic attack)      Past Surgical History:   Procedure Laterality Date    APPENDECTOMY      CHOLECYSTECTOMY      INTESTINAL BYPASS      jejunoileal     Family History   Problem Relation Age of Onset    Arthritis Other     Heart Disease Other     High Blood Pressure Other     Kidney Disease Other     Other Other         respiratory problems    Breast Cancer Neg Hx     Colon Cancer Neg Hx     Diabetes Neg Hx     Cancer Neg Hx     Eclampsia Neg Hx     Ovarian Cancer Neg Hx     Hypertension Neg Hx      Labor Neg Hx     Spont Abortions Neg Hx     Stroke Neg Hx      Social History     Socioeconomic History    Marital status:      Spouse name: Not on file    Number of children: Not on file    Years of education: Not on file    Highest education level: Not on file   Occupational History    Not on file   Tobacco Use    Smoking status: Current Every Day Smoker     Packs/day: 3.00     Years: 54.00     Pack years: 162.00     Types: Cigarettes    Smokeless tobacco: Never Used   Substance and Sexual Activity    Alcohol use: No     Alcohol/week: 0.0 standard drinks    Drug use: No    Sexual activity: Not Currently     Partners: Male     Comment:    Other Topics Concern    Not on file   Social History Narrative    Not on file     Social Determinants of Health     Financial Resource Strain: Low Risk     Difficulty of Paying Living Expenses: Not hard at all   Food Insecurity: No Food Insecurity    Worried About Running Out of Food in the Last Year: Never true    Nirav of Food in the Last Year: Never true   Transportation Needs: No Transportation Needs    Lack of Transportation (Medical): No    Lack of Transportation (Non-Medical): No   Physical Activity:     Days of Exercise per Week: Not on file    Minutes of Exercise per Session: Not on file   Stress:     Feeling of Stress : Not on file   Social Connections:     Frequency of Communication with Friends and Family: Not on file    Frequency of Social Gatherings with Friends and Family: Not on file    Attends Gnosticist Services: Not on file    Active Member of [address] [place] [address] or Organizations: Not on file    Attends Club or Organization Meetings: Not on file    Marital Status: Not on file   Intimate Partner Violence:     Fear of Current or Ex-Partner: Not on file    Emotionally Abused: Not on file    Physically Abused: Not on file    Sexually Abused: Not on file   Housing Stability:     Unable to Pay for Housing in the Last Year: Not on file    Number of [place] in the Last Year: Not on file    Unstable Housing in the Last Year: Not on file         Review of Systems   Constitutional: Negative for chills, diaphoresis, fatigue and fever. HENT: Negative for congestion, mouth sores, nosebleeds, postnasal drip, rhinorrhea, sinus pressure, sneezing, sore throat, trouble swallowing and voice change. Eyes: Negative for discharge, itching and visual disturbance.    Respiratory: Positive for cough, shortness of breath and wheezing. Negative for chest tightness. Cardiovascular: Negative for chest pain, palpitations and leg swelling. Gastrointestinal: Negative for abdominal pain, diarrhea, nausea and vomiting. Genitourinary: Negative for difficulty urinating and hematuria. Musculoskeletal: Negative for arthralgias, joint swelling and myalgias. Skin: Negative for rash. Allergic/Immunologic: Negative for environmental allergies and food allergies. Neurological: Negative for dizziness, tremors, weakness and headaches. Psychiatric/Behavioral: Negative for behavioral problems and sleep disturbance.         :   There were no vitals filed for this visit.   Wt Readings from Last 3 Encounters:   11/12/21 80 lb (36.3 kg)   10/21/21 86 lb (39 kg)   08/24/21 84 lb 9.6 oz (38.4 kg)         Physical Exam phone visit     Current Outpatient Medications   Medication Sig Dispense Refill    tiotropium-olodaterol (STIOLTO) 2.5-2.5 MCG/ACT AERS Inhale 2 puffs into the lungs daily 3 each 3    albuterol sulfate  (90 Base) MCG/ACT inhaler USE 2 INHALATIONS EVERY 6 HOURS AS NEEDED FOR WHEEZING 3 each 3    clonazePAM (KLONOPIN) 0.5 MG tablet TAKE 1 TABLET BY MOUTH EVERY NIGHT AS NEEDED FOR ANXIETY 30 tablet 2    Multiple Vitamins-Minerals (MULTI FOR HER 50+) TABS Take 1 tablet by mouth daily 90 tablet 1    clopidogrel (PLAVIX) 75 MG tablet TAKE 1 TABLET DAILY 90 tablet 3    lipase-protease-amylase (CREON) 96533-48550 units delayed release capsule TAKE 1 CAPSULE TWICE A  capsule 3    losartan (COZAAR) 50 MG tablet Take 1 tablet by mouth daily 90 tablet 1    Magnesium Oxide 400 MG CAPS Take 400 mg by mouth 3 times daily 270 capsule 3    mirtazapine (REMERON) 15 MG tablet TAKE 1 TABLET BY MOUTH EVERY NIGHT 90 tablet 3    pantoprazole (PROTONIX) 40 MG tablet TAKE 1 TABLET DAILY 90 tablet 3    propranolol (INDERAL) 40 MG tablet TAKE 1 TABLET TWICE A DAY (REPLACES PREVIOUS PRESCRIPTION) 180 tablet 3    clotrimazole-betamethasone (LOTRISONE) 1-0.05 % cream APPLY TOPICALLY TWICE A DAY 45 g 14    metroNIDAZOLE (FLAGYL) 500 MG tablet TAKE 1 TABLET THREE TIMES A DAY FOR 10 DAYS PER MONTH 90 tablet 3    Multiple Vitamins-Minerals (THERAPEUTIC MULTIVITAMIN-MINERALS) tablet Take 1 tablet by mouth daily 90 tablet 3     No current facility-administered medications for this visit. Results for orders placed in visit on 04/16/21    XR CHEST STANDARD (2 VW)    Narrative  DIAGNOSIS:J44.9 Chronic obstructive pulmonary disease, unspecified COPD type ([facility] [address] ICD10    COMPARISON: March 24, 2020; May 8, 2020    TECHNIQUE: PA and lateral chest    FINDINGS: The lungs are hyperlucent inflated and there is coarsening of the interstitium. The costophrenic angle is again blunted which may represents chronic scarring. Biapical pleural thickening is again seen. The lungs contain no focal infiltrate,  consolidation or pneumothorax. The cardiac silhouette is not enlarged. Degenerative changes are seen at multiple levels in the dorsal spine. .    Impression  Findings are suggestive of COPD      Results for orders placed during the hospital encounter of 05/08/20    XR CHEST STANDARD (2 VW)    Narrative  XR CHEST (2 VW)    Exam Date/Time:  5/8/2020 2:12 PM  Clinical History:  J44.1 COPD exacerbation (HCC) ICD10  shorts of breath, difficulty breathing, weight loss, history of COPD. Comparison:  3/23/2020    RESULT:        Lungs and pleura:  Emphysematous changes. No focal consolidation. Biapical pleural-parenchymal scarring. Scarring at the lung bases. No pleural effusion. No pneumothorax. Normal pulmonary vascular pattern. Cardiomediastinal silhouette:  Normal.    Other:  No acute osseous findings. Surgical clip upper abdomen near midline. Impression  No acute radiographic abnormality. Emphysema.       Results for orders placed during the hospital encounter of 05/19/15    XR Chest Standard TWO VW    Narrative  EXAMINATION TWO VIEW CHEST - PA/LATERAL    CLINICAL HISTORY CHEST PAIN. COMPARISONS October 4, 2014. FINDINGS The cardiac and mediastinal silhouette is normal.  Lungs are  clear. No pleural effusion or pneumothorax. Bones are intact. IMPRESSION    NO ACUTE CARDIOPULMONARY DISEASE. Hunt Reveal By- Joe Sethi M.D. Released By- Joe Sethi M.D. Released Date Time- 05/21/15 0600  This document has been electronically signed. ------------------------------------------------------------------------------  ]  Results for orders placed during the hospital encounter of 03/23/20    XR CHEST PORTABLE    Narrative  EXAMINATION: PORTABLE CHEST X-RAY FROM 3/23/2020 AT 17:52 HOURS    CLINICAL HISTORY: SMOKING HISTORY AND INTERMITTENT DYSPNEA X2 WEEKS    COMPARISONS: 5/19/2015    FINDINGS: The heart is not enlarged. There is mild atherosclerotic calcification and tortuosity of the aorta. There is hyperinflation of the lungs suggesting COPD. There is blunting of the right CP angle similar to the prior chest film and this likely  represents pleural scarring in the right lung base. No acute pulmonary infiltrates or pleural effusions. No pneumothorax. There is degenerative bone spurring in the spine. Impression  1. NO RADIOGRAPHIC EVIDENCE OF ACTIVE DISEASE IN THE CHEST. 2. HYPERINFLATION OF THE LUNGS SUGGESTIVE OF COPD.  ]  No results found for this or any previous visit.  ]  Results for orders placed during the hospital encounter of 03/23/20    CT CHEST WO CONTRAST    Narrative  CT of the Chest is contrast medium. History:  55 year smoker. Complains weight loss the. Rule out mass. Technical Factors:    CT imaging of the chest was obtained and formatted as 5 mm contiguous axial images from the thoracic inlet through the adrenal glands. Sagittal coronal reconstruction obtained during postprocessing.     Intravenous contrast medium:  None    Comparison: Chest radiograph March 23, 2020, May 19, 2015. Findings:    Bilateral diffuse emphysematous change. No nodules, and no masses bilaterally. Scant dependent subsegmental atelectatic change posterior right lung base. No pleural effusion. No hilar, mediastinal, or axillary lymph node enlargement. Thoracic aorta normal in course and caliber. Cardiac size normal. No pericardial effusion. Coronary calcification demonstrated. Limited imaging upper abdomen shows no gross anomalies. No osteoblastic, and no osteolytic lesions. Diffuse anterior osteophytes thoracic spine. Impression  Emphysema. No lung masses identified. All CT scans at this facility use dose modulation, iterative reconstruction, and/or weight based dosing when appropriate to reduce radiation dose to as low as reasonably achievable. Assessment/Plan:     1. Chronic obstructive pulmonary disease, unspecified COPD type ([facility] [address]  She is using bronchodilator with albuterol neb and albuterol HFA and stiolto respimat 2 puff in AM. C/o shortness of breath , worse with exertion. C/o  Wheezing. C/o Cough with  white Sputum. No Hemoptysis. No Chest tightness. No Chest pain with radiation  or pleuritic pain. Continue bronchodilator therapy as before. - tiotropium-olodaterol (STIOLTO) 2.5-2.5 MCG/ACT AERS; Inhale 2 puffs into the lungs daily  Dispense: 3 each; Refill: 3  - albuterol sulfate  (90 Base) MCG/ACT inhaler; USE 2 INHALATIONS EVERY 6 HOURS AS NEEDED FOR WHEEZING  Dispense: 3 each; Refill: 3    2. Shortness of breath  She having  Chronic shortness of breath which is likely due to COPD, continue  Bronchodilator,  as before. keep  Spo2 90% or above. 3. Tobacco abuse  She is still smoking 2 ppd. She is advised try to quit smoking. Risks related to smoking explained to the patient. Different ways to help to quit smoking were discussed today.     Patient notified that this is a billable service and has given verbal consent for telehealth services. Time spent with patient  21 minutes. Return in about 4 months (around 7/1/2022) for COPD.       Marianna Stoll MD

## 2022-03-11 ENCOUNTER — OFFICE VISIT (OUTPATIENT)
Dept: FAMILY MEDICINE CLINIC | Age: 77
End: 2022-03-11
Payer: MEDICARE

## 2022-03-11 VITALS
DIASTOLIC BLOOD PRESSURE: 76 MMHG | OXYGEN SATURATION: 96 % | HEIGHT: 63 IN | WEIGHT: 84 LBS | SYSTOLIC BLOOD PRESSURE: 120 MMHG | HEART RATE: 64 BPM | TEMPERATURE: 96.4 F | BODY MASS INDEX: 14.88 KG/M2

## 2022-03-11 DIAGNOSIS — E53.8 B12 DEFICIENCY: ICD-10-CM

## 2022-03-11 DIAGNOSIS — N28.9 WORSENING RENAL FUNCTION: Primary | ICD-10-CM

## 2022-03-11 DIAGNOSIS — Z91.81 AT HIGH RISK FOR FALLS: ICD-10-CM

## 2022-03-11 DIAGNOSIS — I10 ESSENTIAL HYPERTENSION: ICD-10-CM

## 2022-03-11 DIAGNOSIS — F41.9 ANXIETY: ICD-10-CM

## 2022-03-11 DIAGNOSIS — E83.42 HYPOMAGNESEMIA: ICD-10-CM

## 2022-03-11 DIAGNOSIS — F03.91 DEMENTIA WITH BEHAVIORAL DISTURBANCE, UNSPECIFIED DEMENTIA TYPE: ICD-10-CM

## 2022-03-11 DIAGNOSIS — E53.8 B12 DEFICIENCY: Primary | ICD-10-CM

## 2022-03-11 LAB
HCT VFR BLD CALC: 36.5 % (ref 37–47)
HEMOGLOBIN: 12 G/DL (ref 12–16)
MCH RBC QN AUTO: 33.5 PG (ref 27–31.3)
MCHC RBC AUTO-ENTMCNC: 33 % (ref 33–37)
MCV RBC AUTO: 101.4 FL (ref 82–100)
PDW BLD-RTO: 13.2 % (ref 11.5–14.5)
PLATELET # BLD: 177 K/UL (ref 130–400)
RBC # BLD: 3.6 M/UL (ref 4.2–5.4)
WBC # BLD: 7.6 K/UL (ref 4.8–10.8)

## 2022-03-11 PROCEDURE — 99214 OFFICE O/P EST MOD 30 MIN: CPT | Performed by: FAMILY MEDICINE

## 2022-03-11 PROCEDURE — 96372 THER/PROPH/DIAG INJ SC/IM: CPT | Performed by: FAMILY MEDICINE

## 2022-03-11 RX ORDER — CYANOCOBALAMIN 1000 UG/ML
1000 INJECTION INTRAMUSCULAR; SUBCUTANEOUS ONCE
Status: COMPLETED | OUTPATIENT
Start: 2022-03-11 | End: 2022-03-11

## 2022-03-11 RX ADMIN — CYANOCOBALAMIN 1000 MCG: 1000 INJECTION INTRAMUSCULAR; SUBCUTANEOUS at 15:05

## 2022-03-11 NOTE — PROGRESS NOTES
Chief Complaint   Patient presents with    Anxiety     check up, would like b-12    Health Maintenance     declined ct and colon       HPI:  Michelle Moseley is a 77 y.o. female     Follow up    Issues per CC      Ongoing leg pain/soreness    Weight stable, up a few pounds  Eating, but not much   Eating better     b12 shot       Wt Readings from Last 3 Encounters:   03/11/22 84 lb (38.1 kg)   11/12/21 80 lb (36.3 kg)   10/21/21 86 lb (39 kg)     Declining screens  Compliant with meds for HTN    GERD, on protonix  COPD, uses albuterol    plavix for reported hx of TIA    She had ileojejunal bypass in 1978            Wt Readings from Last 3 Encounters:   03/11/22 84 lb (38.1 kg)   11/12/21 80 lb (36.3 kg)   10/21/21 86 lb (39 kg)         Patient Active Problem List   Diagnosis    Hypertensive disorder    Venous tributary (branch) occlusion of retina    Cortical senile cataract    Macular degeneration, age related, nonexudative    Nuclear senile cataract    Gastroesophageal reflux disease    Chronic obstructive pulmonary disease (HCC)    History of disease    Cobalamin deficiency    Anxiety    Chronic obstructive lung disease ([facility] [address]    COPD exacerbation ([facility] [address]    Postmenopausal bleeding    Shortness of breath    Tobacco abuse    Dementia with behavioral disturbance, unspecified dementia type ([facility])       Current Outpatient Medications   Medication Sig Dispense Refill    tiotropium-olodaterol (STIOLTO) 2.5-2.5 MCG/ACT AERS Inhale 2 puffs into the lungs daily 3 each 3    albuterol sulfate  (90 Base) MCG/ACT inhaler USE 2 INHALATIONS EVERY 6 HOURS AS NEEDED FOR WHEEZING 3 each 3    clonazePAM (KLONOPIN) 0.5 MG tablet TAKE 1 TABLET BY MOUTH EVERY NIGHT AS NEEDED FOR ANXIETY 30 tablet 2    Multiple Vitamins-Minerals (MULTI FOR HER 50+) TABS Take 1 tablet by mouth daily 90 tablet 1    clopidogrel (PLAVIX) 75 MG tablet TAKE 1 TABLET DAILY 90 tablet 3    lipase-protease-amylase (CREON) 66351-24113 units delayed release capsule TAKE 1 CAPSULE TWICE A  capsule 3    losartan (COZAAR) 50 MG tablet Take 1 tablet by mouth daily 90 tablet 1    Magnesium Oxide 400 MG CAPS Take 400 mg by mouth 3 times daily 270 capsule 3    mirtazapine (REMERON) 15 MG tablet TAKE 1 TABLET BY MOUTH EVERY NIGHT 90 tablet 3    pantoprazole (PROTONIX) 40 MG tablet TAKE 1 TABLET DAILY 90 tablet 3    propranolol (INDERAL) 40 MG tablet TAKE 1 TABLET TWICE A DAY (REPLACES PREVIOUS PRESCRIPTION) 180 tablet 3    clotrimazole-betamethasone (LOTRISONE) 1-0.05 % cream APPLY TOPICALLY TWICE A DAY 45 g 14    metroNIDAZOLE (FLAGYL) 500 MG tablet TAKE 1 TABLET THREE TIMES A DAY FOR 10 DAYS PER MONTH 90 tablet 3    Multiple Vitamins-Minerals (THERAPEUTIC MULTIVITAMIN-MINERALS) tablet Take 1 tablet by mouth daily 90 tablet 3     No current facility-administered medications for this visit.          Past Medical History:   Diagnosis Date    Bowel disease     Cataract     Chronic back pain     COPD (chronic obstructive pulmonary disease) (HCC)     Dementia (HCC)     D[facility] with [facility] disturbance, [facility] 3/11/2022    Dizziness and giddiness     GERD (gastroesophageal reflux disease)     Glaucoma     Hypertension     Osteoarthritis     Pancreatitis     Seizures (HCC)     TIA (transient ischemic attack)      Past Surgical History:   Procedure Laterality Date    APPENDECTOMY      CHOLECYSTECTOMY      INTESTINAL BYPASS      jejunoileal     Family History   Problem Relation Age of Onset    Arthritis Other     Heart Disease Other     High Blood Pressure Other     Kidney Disease Other     Other Other         respiratory problems    Breast Cancer Neg Hx     Colon Cancer Neg Hx     Diabetes Neg Hx     Cancer Neg Hx     Eclampsia Neg Hx     Ovarian Cancer Neg Hx     Hypertension Neg Hx      Labor Neg Hx     Spont Abortions Neg Hx     Stroke Neg Hx      Social History     Socioeconomic History    Marital status:      Spouse name: None    Number of children: None    Years of education: None    Highest education level: None   Occupational History    None   Tobacco Use    Smoking status: Current Every Day Smoker     Packs/day: 3.00     Years: 54.00     Pack years: 162.00     Types: Cigarettes    Smokeless tobacco: Never Used   Substance and Sexual Activity    Alcohol use: No     Alcohol/week: 0.0 standard drinks    Drug use: No    Sexual activity: Not Currently     Partners: Male     Comment:    Other Topics Concern    None   Social History Narrative    None     Social Determinants of Health     Financial Resource Strain: Low Risk     Difficulty of Paying Living Expenses: Not hard at all   Food Insecurity: No Food Insecurity    Worried About Running Out of Food in the Last Year: Never true    Nirav of Food in the Last Year: Never true   Transportation Needs: No Transportation Needs    Lack of Transportation (Medical): No    Lack of Transportation (Non-Medical):  No   Physical Activity:     Days of Exercise per Week: Not on file    Minutes of Exercise per Session: Not on file   Stress:     Feeling of Stress : Not on file   Social Connections:     Frequency of Communication with Friends and Family: Not on file    Frequency of Social Gatherings with Friends and Family: Not on file    Attends [employer] Services: Not on file    Active Member of Clubs or Organizations: Not on file    Attends Club or Organization Meetings: Not on file    Marital Status: Not on file   Intimate Partner Violence:     Fear of Current or Ex-Partner: Not on file    Emotionally Abused: Not on file    Physically Abused: Not on file    Sexually Abused: Not on file   Housing Stability:     Unable to Pay for Housing in the Last Year: Not on file    Number of [place] in the Last Year: Not on file    Unstable Housing in the Last Year: Not on file     Allergies   Allergen Reactions    Aspirin      Bothers stomach    Penicillins      Unable to take    Adhesive Tape Rash     Patient states Silk Tape works the best       Review of Systems:   General ROS: grief  Respiratory ROS: improved SOB  Cardiovascular ROS: no chest pain or dyspnea on exertion  Gastrointestinal ROS: hx of ileojejunal bypass  Genito-Urinary ROS: no dysuria, trouble voiding  Musculoskeletal ROS: some pain in her lower legs  Neurological ROS: negative for - behavioral changes, memory loss, numbness/tingling, tremors or weakness    In general patient otherwise reports feeling well. Physical Exam:  /76 (Site: Left Upper Arm)   Pulse 64   Temp 96.4 °F (35.8 °C)   Ht 5' 3\" (1.6 m)   Wt 84 lb (38.1 kg)   LMP  (LMP Unknown)   SpO2 96%   Breastfeeding No   BMI 14.88 kg/m²     Gen: Well, NAD, Alert, Oriented x 3  HEENT: EOMI, eyes clear, MMM  Skin: small stage 1 decub right sacral area, no real breakdown   Neck: no significant lymphadenopathy or thyromegaly  Lungs: exp wheeze   Heart: RRR, S1S2, w/out M/R/G, non-displaced PMI   Ext:trace pedal edema  Psych: she appears euthymic  Patient has BUE and LUE strength deficits of 4/5  Prominent xiphoid process, benign     Lab Results   Component Value Date    WBC 8.3 03/01/2021    HGB 13.3 03/01/2021    HCT 40.3 03/01/2021     03/01/2021    CHOL 118 12/08/2017    TRIG 235 (H) 12/08/2017    HDL 26 (L) 12/08/2017    ALT <5 03/01/2021    AST <5 03/01/2021     04/27/2021    K 4.7 04/27/2021     (H) 04/27/2021    CREATININE 1.18 (H) 04/27/2021    BUN 30 (H) 04/27/2021    CO2 22 04/27/2021    TSH 4.010 (H) 03/01/2021    INR 0.9 11/15/2019         A&P   Diagnosis Orders   1. B12 deficiency  Vitamin B12 & Folate   2. At high risk for falls     3. Dementia with behavioral disturbance, unspecified dementia type ([address]     4. Anxiety  TSH   5. Hypomagnesemia     6.  Essential hypertension  CBC    Lipid Panel    Comprehensive Metabolic Panel     Refills given     Refuses screens    Refuses covid vaccine     Klonopin on going    Not taking aricept    Living with daughter    calmoseptine    b12 shot         Wt Readings from Last 3 Encounters:   03/11/22 84 lb (38.1 kg)   11/12/21 80 lb (36.3 kg)   10/21/21 86 lb (39 kg)           Patient is only able to use walker for short distances with increased risk of falling  Given hand  and upper and lower extremity weakness, she would benefit from the use of a transport chair within the home and in going from the car to doctor's appointments and other errands.      She is unable to safely use only a cane due to extremity weakness    Breana Varghese MD        Moderate MDM      Home safety tips provided

## 2022-03-12 LAB
FOLATE: >20 NG/ML
VITAMIN B-12: >2000 PG/ML (ref 232–1245)

## 2022-03-14 LAB
ALBUMIN SERPL-MCNC: 4.3 G/DL (ref 3.5–4.6)
ALP BLD-CCNC: 75 U/L (ref 40–130)
ALT SERPL-CCNC: 13 U/L (ref 0–33)
ANION GAP SERPL CALCULATED.3IONS-SCNC: 19 MEQ/L (ref 9–15)
AST SERPL-CCNC: 20 U/L (ref 0–35)
BILIRUB SERPL-MCNC: 0.3 MG/DL (ref 0.2–0.7)
BUN BLDV-MCNC: 61 MG/DL (ref 8–23)
CALCIUM SERPL-MCNC: 9.5 MG/DL (ref 8.5–9.9)
CHLORIDE BLD-SCNC: 113 MEQ/L (ref 95–107)
CHOLESTEROL, TOTAL: 133 MG/DL (ref 0–199)
CO2: 12 MEQ/L (ref 20–31)
CREAT SERPL-MCNC: 1.91 MG/DL (ref 0.5–0.9)
GFR AFRICAN AMERICAN: 30.9
GFR NON-AFRICAN AMERICAN: 25.6
GLOBULIN: 2.9 G/DL (ref 2.3–3.5)
GLUCOSE BLD-MCNC: 47 MG/DL (ref 70–99)
HDLC SERPL-MCNC: 28 MG/DL (ref 40–59)
LDL CHOLESTEROL CALCULATED: 57 MG/DL (ref 0–129)
POTASSIUM SERPL-SCNC: 4.4 MEQ/L (ref 3.4–4.9)
SODIUM BLD-SCNC: 144 MEQ/L (ref 135–144)
TOTAL PROTEIN: 7.2 G/DL (ref 6.3–8)
TRIGL SERPL-MCNC: 238 MG/DL (ref 0–150)
TSH SERPL DL<=0.05 MIU/L-ACNC: 3.57 UIU/ML (ref 0.44–3.86)

## 2022-04-20 DIAGNOSIS — J44.9 CHRONIC OBSTRUCTIVE PULMONARY DISEASE, UNSPECIFIED COPD TYPE (HCC): Primary | ICD-10-CM

## 2022-04-20 RX ORDER — ALBUTEROL SULFATE 90 UG/1
AEROSOL, METERED RESPIRATORY (INHALATION)
Qty: 54 G | Refills: 2 | Status: SHIPPED | OUTPATIENT
Start: 2022-04-20 | End: 2022-07-01 | Stop reason: SDUPTHER

## 2022-04-20 NOTE — TELEPHONE ENCOUNTER
Rx requested:  Requested Prescriptions     Pending Prescriptions Disp Refills    albuterol sulfate HFA (VENTOLIN HFA) 108 (90 Base) MCG/ACT inhaler [Pharmacy Med Name: VENTOLIN HFA INH 18GM W/COUNT 90MCG] 54 g 2     Sig: USE 2 INHALATIONS EVERY 6 HOURS AS NEEDED FOR WHEEZING       Last Office Visit:   3/1/2022      Next Visit Date:  Future Appointments   Date Time Provider Josiane Falcon   6/14/2022  1:30 PM Kareen Manning MD [facility] [place]   7/1/2022 11:00 AM Js Chapman MD [facility]   11/15/2022  2:00 PM Kareen Manning MD [facility] [place]

## 2022-04-22 RX ORDER — LOSARTAN POTASSIUM 50 MG/1
TABLET ORAL
Qty: 90 TABLET | Refills: 3 | Status: SHIPPED | OUTPATIENT
Start: 2022-04-22

## 2022-04-25 DIAGNOSIS — Z98.0 INTESTINAL BYPASS OR ANASTOMOSIS STATUS: ICD-10-CM

## 2022-04-26 RX ORDER — METRONIDAZOLE 500 MG/1
TABLET ORAL
Qty: 90 TABLET | Refills: 3 | Status: SHIPPED | OUTPATIENT
Start: 2022-04-26

## 2022-05-04 NOTE — TELEPHONE ENCOUNTER
Requesting medication refill.  Please approve or deny this request.    Rx requested:  Requested Prescriptions     Pending Prescriptions Disp Refills    Multiple Vitamins-Minerals (MULTIVITAMIN WOMEN 50+) TABS [Pharmacy Med Name: MULTIVITAMIN WOMEN 50+ TABLETS] 90 tablet 1     Sig: TAKE 1 TABLET BY MOUTH EVERY DAY       Last Office Visit:   3/11/2022    Next Visit Date:  Future Appointments   Date Time Provider Josiane Falcon   5/13/2022  2:30 PM Ava Mcleod MD [facility]   6/14/2022  1:30 PM Ava Mcleod MD [facility]   7/1/2022 11:00 AM Kiana Villavicencio, [address] [facility]   11/15/2022  2:00 PM Ava Mcleod MD [facility]

## 2022-05-13 ENCOUNTER — OFFICE VISIT (OUTPATIENT)
Dept: FAMILY MEDICINE CLINIC | Age: 77
End: 2022-05-13
Payer: MEDICARE

## 2022-05-13 VITALS
OXYGEN SATURATION: 98 % | SYSTOLIC BLOOD PRESSURE: 120 MMHG | HEIGHT: 63 IN | BODY MASS INDEX: 14.78 KG/M2 | HEART RATE: 83 BPM | WEIGHT: 83.4 LBS | TEMPERATURE: 98.2 F | DIASTOLIC BLOOD PRESSURE: 86 MMHG

## 2022-05-13 DIAGNOSIS — E53.8 B12 DEFICIENCY: ICD-10-CM

## 2022-05-13 DIAGNOSIS — F41.9 ANXIETY: Primary | ICD-10-CM

## 2022-05-13 DIAGNOSIS — F03.91 DEMENTIA WITH BEHAVIORAL DISTURBANCE, UNSPECIFIED DEMENTIA TYPE: ICD-10-CM

## 2022-05-13 DIAGNOSIS — I10 ESSENTIAL HYPERTENSION: ICD-10-CM

## 2022-05-13 DIAGNOSIS — N18.30 STAGE 3 CHRONIC KIDNEY DISEASE, UNSPECIFIED WHETHER STAGE 3A OR 3B CKD (HCC): ICD-10-CM

## 2022-05-13 DIAGNOSIS — R44.0 AUDITORY HALLUCINATION: ICD-10-CM

## 2022-05-13 PROCEDURE — 99214 OFFICE O/P EST MOD 30 MIN: CPT | Performed by: FAMILY MEDICINE

## 2022-05-13 PROCEDURE — 96372 THER/PROPH/DIAG INJ SC/IM: CPT | Performed by: FAMILY MEDICINE

## 2022-05-13 RX ORDER — CLONAZEPAM 0.5 MG/1
TABLET ORAL
Qty: 30 TABLET | Refills: 2 | Status: SHIPPED | OUTPATIENT
Start: 2022-05-13 | End: 2022-08-25 | Stop reason: SDUPTHER

## 2022-05-13 RX ORDER — CYANOCOBALAMIN 1000 UG/ML
1000 INJECTION INTRAMUSCULAR; SUBCUTANEOUS ONCE
Status: COMPLETED | OUTPATIENT
Start: 2022-05-13 | End: 2022-05-13

## 2022-05-13 RX ADMIN — CYANOCOBALAMIN 1000 MCG: 1000 INJECTION INTRAMUSCULAR; SUBCUTANEOUS at 15:01

## 2022-05-13 NOTE — PROGRESS NOTES
Chief Complaint   Patient presents with    Dementia     consult, doens't feel she has it    Other     something wrong with kidney, nephrology, has to eat becaseu kidneys are failing, feels she shouldn't nehal byself     Otalgia     right ear hurtin krystle and off        HPI:  Elias Steele is a 69 y.o. female     Follow up    Issues per CC    Ongoing leg pain/soreness    Weight stable, up a few pounds  Eating, but not much   Eating better     b12 shot       Wt Readings from Last 3 Encounters:   05/13/22 83 lb 6.4 oz (37.8 kg)   03/11/22 84 lb (38.1 kg)   11/12/21 80 lb (36.3 kg)     Declining screens  Compliant with meds for HTN    GERD, on protonix  COPD, uses albuterol    plavix for reported hx of TIA    She had ileojejunal bypass in 1978            Wt Readings from Last 3 Encounters:   05/13/22 83 lb 6.4 oz (37.8 kg)   03/11/22 84 lb (38.1 kg)   11/12/21 80 lb (36.3 kg)         Patient Active Problem List   Diagnosis    Hypertensive disorder    Venous tributary (branch) occlusion of retina    Cortical senile cataract    Macular degeneration, age related, nonexudative    Nuclear senile cataract    Gastroesophageal reflux disease    Chronic obstructive pulmonary disease ([address]    History of disease    Cobalamin deficiency    Anxiety    Chronic obstructive lung disease ([address]    COPD exacerbation ([address]    Postmenopausal bleeding    Shortness of breath    Tobacco abuse    Dementia with behavioral disturbance, unspecified dementia type ([address]    Chronic renal disease, stage III (HCC) [740595]       Current Outpatient Medications   Medication Sig Dispense Refill    clonazePAM (KLONOPIN) 0.5 MG tablet TAKE 1 TABLET BY MOUTH EVERY NIGHT AS NEEDED FOR ANXIETY 30 tablet 2    Multiple Vitamins-Minerals (MULTIVITAMIN WOMEN 50+) TABS TAKE 1 TABLET BY MOUTH EVERY DAY 90 tablet 1    metroNIDAZOLE (FLAGYL) 500 MG tablet TAKE 1 TABLET THREE TIMES A DAY FOR 10 DAYS PER MONTH 90 tablet 3    losartan (COZAAR) 50 MG tablet TAKE 1 TABLET DAILY 90 tablet 3    albuterol sulfate HFA (VENTOLIN HFA) 108 (90 Base) MCG/ACT inhaler USE 2 INHALATIONS EVERY 6 HOURS AS NEEDED FOR WHEEZING 54 g 2    tiotropium-olodaterol (STIOLTO) 2.5-2.5 MCG/ACT AERS Inhale 2 puffs into the lungs daily 3 each 3    clopidogrel (PLAVIX) 75 MG tablet TAKE 1 TABLET DAILY 90 tablet 3    lipase-protease-amylase (CREON) 15319-16310 units delayed release capsule TAKE 1 CAPSULE TWICE A  capsule 3    Magnesium Oxide 400 MG CAPS Take 400 mg by mouth 3 times daily 270 capsule 3    mirtazapine (REMERON) 15 MG tablet TAKE 1 TABLET BY MOUTH EVERY NIGHT 90 tablet 3    pantoprazole (PROTONIX) 40 MG tablet TAKE 1 TABLET DAILY 90 tablet 3    propranolol (INDERAL) 40 MG tablet TAKE 1 TABLET TWICE A DAY (REPLACES PREVIOUS PRESCRIPTION) 180 tablet 3    clotrimazole-betamethasone (LOTRISONE) 1-0.05 % cream APPLY TOPICALLY TWICE A DAY 45 g 14    Multiple Vitamins-Minerals (THERAPEUTIC MULTIVITAMIN-MINERALS) tablet Take 1 tablet by mouth daily 90 tablet 3     Current Facility-Administered Medications   Medication Dose Route Frequency Provider Last Rate Last Admin    cyanocobalamin injection 1,000 mcg  1,000 mcg IntraMUSCular Once Trevon Tang MD             Past Medical History:   Diagnosis Date    Bowel disease     Cataract     Chronic back pain     Chronic renal disease, stage III [facility]) [247630] 5/13/2022    COPD [facility]) [address]     Dementia ([address]     Dementia with behavioral disturbance, unspecified dementia type ([address] 3/11/2022    Dizziness and giddiness     GERD (gastroesophageal reflux disease)     Glaucoma     Hypertension     Osteoarthritis     Pancreatitis     Seizures ([address]     TIA (transient ischemic attack)      Past Surgical History:   Procedure Laterality Date    APPENDECTOMY      CHOLECYSTECTOMY      INTESTINAL BYPASS      jejunoileal     Family History   Problem Relation Age of Onset    Arthritis Other     Heart Disease Other     High Blood Pressure Other     Kidney Disease Other     Other Other         respiratory problems    Breast Cancer Neg Hx     Colon Cancer Neg Hx     Diabetes Neg Hx     Cancer Neg Hx     Eclampsia Neg Hx     Ovarian Cancer Neg Hx     Hypertension Neg Hx      Labor Neg Hx     Spont Abortions Neg Hx     Stroke Neg Hx      Social History     Socioeconomic History    Marital status:      Spouse name: None    Number of children: None    Years of education: None    Highest education level: None   Occupational History    None   Tobacco Use    Smoking status: Current Every Day Smoker     Packs/day: 3.00     Years: 54.00     Pack years: 162.00     Types: Cigarettes    Smokeless tobacco: Never Used   Substance and Sexual Activity    Alcohol use: No     Alcohol/week: 0.0 standard drinks    Drug use: No    Sexual activity: Not Currently     Partners: Male     Comment:    Other Topics Concern    None   Social History Narrative    None     Social Determinants of Health     Financial Resource Strain: Low Risk     Difficulty of Paying Living Expenses: Not hard at all   Food Insecurity: No Food Insecurity    Worried About Running Out of Food in the Last Year: Never true    Nirav of Food in the Last Year: Never true   Transportation Needs: No Transportation Needs    Lack of Transportation (Medical): No    Lack of Transportation (Non-Medical):  No   Physical Activity:     Days of Exercise per Week: Not on file    Minutes of Exercise per Session: Not on file   Stress:     Feeling of Stress : Not on file   Social Connections:     Frequency of Communication with Friends and Family: Not on file    Frequency of Social Gatherings with Friends and Family: Not on file    Attends Bahai Services: Not on file    Active Member of Clubs or Organizations: Not on file    Attends Club or Organization Meetings: Not on file    Marital Status: Not on file Intimate Partner Violence:     Fear of Current or Ex-Partner: Not on file    Emotionally Abused: Not on file    Physically Abused: Not on file    Sexually Abused: Not on file   Housing Stability:     Unable to Pay for Housing in the Last Year: Not on file    Number of [place] in the Last Year: Not on file    Unstable Housing in the Last Year: Not on file     Allergies   Allergen Reactions    Aspirin      Bothers stomach    Penicillins      Unable to take    Adhesive Tape Rash     Patient states [address] works the best       Review of Systems:   General ROS: grief  Respiratory ROS: improved SOB  Cardiovascular ROS: no chest pain or dyspnea on exertion  Gastrointestinal ROS: hx of ileojejunal bypass  Genito-Urinary ROS: no dysuria, trouble voiding  Musculoskeletal ROS: some pain in her lower legs  Neurological ROS: negative for - behavioral changes, memory loss, numbness/tingling, tremors or weakness    In general patient otherwise reports feeling well.      Physical Exam:  /86 (Site: Left Upper Arm)   Pulse 83   Temp 98.2 °F (36.8 °C)   Ht 5' 3\" (1.6 m)   Wt 83 lb 6.4 oz (37.8 kg)   LMP  (LMP Unknown)   SpO2 98%   Breastfeeding No   BMI 14.77 kg/m²     Gen: Well, NAD, Alert, Oriented x 3  HEENT: EOMI, eyes clear, MMM  Skin: small stage 1 decub right sacral area, no real breakdown   Neck: no significant lymphadenopathy or thyromegaly  Lungs: exp wheeze   Heart: RRR, S1S2, w/out M/R/G, non-displaced PMI   Ext:trace pedal edema  Psych: she appears euthymic  Patient has BUE and LUE strength deficits of 4/5    Lab Results   Component Value Date    WBC 8.3 04/27/2022    HGB 12.3 04/27/2022    HCT 38.4 04/27/2022     04/27/2022    CHOL 133 03/11/2022    TRIG 238 (H) 03/11/2022    HDL 28 (L) 03/11/2022    ALT 13 03/11/2022    AST 20 03/11/2022     04/27/2022    K 4.1 04/27/2022     (H) 04/27/2022    CREATININE 1.55 (H) 04/27/2022    BUN 62 (H) 04/27/2022    CO2 18 (L) 04/27/2022    TSH 3.570 03/11/2022    INR 0.9 11/15/2019         A&P   Diagnosis Orders   1. Anxiety  clonazePAM (KLONOPIN) 0.5 MG tablet   2. Essential hypertension     3. B12 deficiency  cyanocobalamin injection 1,000 mcg   4. Dementia with behavioral disturbance, unspecified dementia type [facility])  MRI Nisha Cope MD, Neurology, [facility]   5. Stage 3 chronic kidney disease, unspecified whether stage 3a or 3b CKD ([address]     6. Auditory hallucination  MRI BRAIN Dee Jones MD, Neurology, [facility]     Refills given     Refuses screens    Refuses covid vaccine     Klonopin on going    Not taking aricept    b12 shot         Wt Readings from Last 3 Encounters:   05/13/22 83 lb 6.4 oz (37.8 kg)   03/11/22 84 lb (38.1 kg)   11/12/21 80 lb (36.3 kg)           Patient is only able to use walker for short distances with increased risk of falling  Given hand  and upper and lower extremity weakness, she would benefit from the use of a transport chair within the home and in going from the car to doctor's appointments and other errands.      She is unable to safely use only a cane due to extremity weakness    Jackson Warner MD        Moderate [facility]      Home safety tips provided

## 2022-05-18 ENCOUNTER — HOSPITAL ENCOUNTER (OUTPATIENT)
Dept: MRI IMAGING | Age: 77
Discharge: HOME | End: 2022-05-20
Payer: MEDICARE

## 2022-05-18 DIAGNOSIS — R44.0 AUDITORY HALLUCINATION: ICD-10-CM

## 2022-05-18 DIAGNOSIS — F03.91 DEMENTIA WITH BEHAVIORAL DISTURBANCE, UNSPECIFIED DEMENTIA TYPE: ICD-10-CM

## 2022-05-18 PROCEDURE — 70551 MRI BRAIN STEM W/O DYE: CPT

## 2022-05-18 NOTE — PROGRESS NOTES
wheeled walker  ADL Assistance: [address]: Needs assistance  Homemaking Responsibilities: (Family assists with cleaning, laundry, and shopping)  Ambulation Assistance: Independent  Transfer Assistance: Independent  Active : No    OBJECTIVE:     Orientation Status:  Orientation  Overall Orientation Status: Within Functional Limits    Observation:  Observation/Palpation  Posture: Fair  Observation: kyhposis noted    Cognition Status:  Cognition  Cognition Comment: decreased safety awareness    Perception Status:  Perception  Overall Perceptual Status: WFL    Sensation Status:  Sensation  Overall Sensation Status: WFL    Vision and Hearing Status:  Vision  Vision: Impaired  Vision Exceptions: Wears glasses at all times  Hearing  Hearing: Within functional limits     ROM:   LUE AROM (degrees)  LUE AROM : WFL  Left Hand AROM (degrees)  Left Hand AROM: WFL  RUE AROM (degrees)  RUE AROM : WFL  Right Hand AROM (degrees)  Right Hand AROM: WFL    Strength:  LUE Strength  Gross LUE Strength: WFL  L Hand General: 4-/5  RUE Strength  Gross RUE Strength: WFL  R Hand General: 4-/5    Coordination, Tone, Quality of Movement:    Tone RUE  RUE Tone: Normotonic  Tone LUE  LUE Tone: Normotonic  Coordination  Movements Are Fluid And Coordinated: Yes    Hand Dominance:  Hand Dominance  Hand Dominance: Right    ADL Status:  ADL  Feeding: Unable to assess(comment)  Grooming: Setup  UE Bathing: Stand by assistance  LE Bathing: Minimal assistance  UE Dressing: Stand by assistance  LE Dressing: Minimal assistance  Toileting: Unable to assess(comment)          Therapy key for assistance levels -   Independent = Pt. is able to perform task with no assistance but may require a device   Stand by assistance = Pt. does not perform task at an independent level but does not need physical assistance, requires verbal cues  Minimal, Moderate, Maximal Assistance = Pt. requires physical assistance (25%, 50%, 75% assist from helper) for task but is able to actively participate in task   Dependent = Pt. requires total assistance with task and is not able to actively participate with task completion     Functional Mobility:     Transfers  Sit to stand: Contact guard assistance  Stand to sit: Contact guard assistance    Bed Mobility  Bed mobility  Supine to Sit: Supervision    Seated and Standing Balance:  Balance  Sitting Balance: Supervision  Standing Balance: Contact guard assistance    Functional Endurance:  Activity Tolerance  Activity Tolerance: Patient limited by fatigue  Activity Tolerance: Fair-    D/C Recommendations:  OT D/C RECOMMENDATIONS  REQUIRES OT FOLLOW UP: Yes    Equipment Recommendations:       OT Education:        OT Follow Up:  OT D/C RECOMMENDATIONS  REQUIRES OT FOLLOW UP: Yes       Assessment/Discharge Disposition:     Performance deficits / Impairments: Decreased functional mobility , Decreased strength, Decreased endurance, Decreased ADL status, Decreased balance, Decreased safe awareness  Prognosis: Good  Discharge Recommendations: Continue to assess pending progress  Decision Making: Medium Complexity  History: 7 complexities  Exam: 6 deficits  Assistance / Modification: Min A    Six Click Score    How much help for putting on and taking off regular lower body clothing?: A Little  How much help for Bathing?: A Little  How much help for Toileting?: None  How much help for putting on and taking off regular upper body clothing?: None  How much help for taking care of personal grooming?: None  How much help for eating meals?: None  AM-[facility] Inpatient Daily Activity Raw Score: 22  AM-PAC Inpatient ADL T-Scale Score : 47.1  ADL Inpatient CMS 0-100% Score: 25.8    Plan:  Plan  Times per week: 1-4x/wk  Current Treatment Recommendations: Strengthening, Functional Mobility Training, Endurance Training, Balance Training, Neuromuscular Re-education, Safety Education & Training, Self-Care / ADL, Equipment Evaluation, 10

## 2022-07-01 ENCOUNTER — OFFICE VISIT (OUTPATIENT)
Dept: PULMONOLOGY | Age: 77
End: 2022-07-01
Payer: MEDICARE

## 2022-07-01 VITALS
WEIGHT: 87 LBS | HEART RATE: 81 BPM | BODY MASS INDEX: 15.41 KG/M2 | OXYGEN SATURATION: 98 % | SYSTOLIC BLOOD PRESSURE: 133 MMHG | DIASTOLIC BLOOD PRESSURE: 66 MMHG | HEIGHT: 63 IN | TEMPERATURE: 98.3 F

## 2022-07-01 DIAGNOSIS — Z72.0 TOBACCO ABUSE: ICD-10-CM

## 2022-07-01 DIAGNOSIS — R06.02 SHORTNESS OF BREATH: ICD-10-CM

## 2022-07-01 DIAGNOSIS — J44.9 CHRONIC OBSTRUCTIVE PULMONARY DISEASE, UNSPECIFIED COPD TYPE (HCC): Primary | ICD-10-CM

## 2022-07-01 PROCEDURE — 1123F ACP DISCUSS/DSCN MKR DOCD: CPT | Performed by: INTERNAL MEDICINE

## 2022-07-01 PROCEDURE — 99214 OFFICE O/P EST MOD 30 MIN: CPT | Performed by: INTERNAL MEDICINE

## 2022-07-01 RX ORDER — ALBUTEROL SULFATE 90 UG/1
AEROSOL, METERED RESPIRATORY (INHALATION)
Qty: 54 G | Refills: 2 | Status: SHIPPED | OUTPATIENT
Start: 2022-07-01

## 2022-07-01 ASSESSMENT — ENCOUNTER SYMPTOMS
VOICE CHANGE: 0
SORE THROAT: 0
COUGH: 1
EYE ITCHING: 0
TROUBLE SWALLOWING: 0
RHINORRHEA: 0
ABDOMINAL PAIN: 0
VOMITING: 0
WHEEZING: 1
CHEST TIGHTNESS: 0
NAUSEA: 0
DIARRHEA: 0
EYE DISCHARGE: 0
SINUS PRESSURE: 0
SHORTNESS OF BREATH: 1

## 2022-07-01 NOTE — PROGRESS NOTES
Subjective:     Katerine Morales is a 69 y.o. female who complains today of:     Chief Complaint   Patient presents with    COPD     4 month f/u       HPI  She is using bronchodilator with albuterol neb and albuterol HFA and stiolto respimat 2 puff in AM.  She is still smoking 2 ppd. C/o shortness of breath  with exertion.   C/o  Wheezing. C/o Cough with  white Sputum. No Hemoptysis. No Chest tightness. No Chest pain with radiation  or pleuritic pain. No  leg edema. No orthopnea. No Fever or chills. No Rhinorrhea and postnasal drip.   She gain 4 lbs since last visit        Allergies:  Aspirin, Penicillins, and Adhesive tape  Past Medical History:   Diagnosis Date    Bowel disease     Cataract     Chronic back pain     Chronic renal disease, stage III [facility]) [364587] 2022    COPD (chronic obstructive pulmonary disease) (HCC)     Dementia (HCC)     Dementia with behavioral disturbance, unspecified dementia type ([address] 3/11/2022    Dizziness and giddiness     GERD (gastroesophageal reflux disease)     Glaucoma     Hypertension     Osteoarthritis     Pancreatitis     Seizures (HCC)     TIA (transient ischemic attack)      Past Surgical History:   Procedure Laterality Date    APPENDECTOMY      CHOLECYSTECTOMY      INTESTINAL BYPASS      jejunoileal     Family History   Problem Relation Age of Onset    Arthritis Other     Heart Disease Other     High Blood Pressure Other     Kidney Disease Other     Other Other         respiratory problems    Breast Cancer Neg Hx     Colon Cancer Neg Hx     Diabetes Neg Hx     Cancer Neg Hx     Eclampsia Neg Hx     Ovarian Cancer Neg Hx     Hypertension Neg Hx      Labor Neg Hx     Spont Abortions Neg Hx     Stroke Neg Hx      Social History     Socioeconomic History    Marital status:      Spouse name: Not on file    Number of children: Not on file    Years of education: Not on file    Highest education level: Not on file Occupational History    Not on file   Tobacco Use    Smoking status: Current Every Day Smoker     Packs/day: 3.00     Years: 54.00     Pack years: 162.00     Types: Cigarettes    Smokeless tobacco: Never Used   Substance and Sexual Activity    Alcohol use: No     Alcohol/week: 0.0 standard drinks    Drug use: No    Sexual activity: Not Currently     Partners: Male     Comment:    Other Topics Concern    Not on file   Social History Narrative    Not on file     Social Determinants of Health     Financial Resource Strain:     Difficulty of Paying Living Expenses: Not on file   Food Insecurity:     Worried About Running Out of Food in the Last Year: Not on file    Nirav of Food in the Last Year: Not on file   Transportation Needs:     Lack of Transportation (Medical): Not on file    Lack of Transportation (Non-Medical): Not on file   Physical Activity:     Days of Exercise per Week: Not on file    Minutes of Exercise per Session: Not on file   Stress:     Feeling of Stress : Not on file   Social Connections:     Frequency of Communication with Friends and Family: Not on file    Frequency of Social Gatherings with Friends and Family: Not on file    Attends Yazidism Services: Not on file    Active Member of [address] [place] [address] or Organizations: Not on file    Attends Club or Organization Meetings: Not on file    Marital Status: Not on file   Intimate Partner Violence:     Fear of Current or Ex-Partner: Not on file    Emotionally Abused: Not on file    Physically Abused: Not on file    Sexually Abused: Not on file   Housing Stability:     Unable to Pay for Housing in the Last Year: Not on file    Number of [place] in the Last Year: Not on file    Unstable Housing in the Last Year: Not on file         Review of Systems   Constitutional: Negative for chills, diaphoresis, fatigue and fever.    HENT: Negative for congestion, mouth sores, nosebleeds, postnasal drip, rhinorrhea, sinus pressure, sneezing, sore throat, trouble swallowing and voice change. Eyes: Negative for discharge, itching and visual disturbance. Respiratory: Positive for cough, shortness of breath and wheezing. Negative for chest tightness. Cardiovascular: Negative for chest pain, palpitations and leg swelling. Gastrointestinal: Negative for abdominal pain, diarrhea, nausea and vomiting. Genitourinary: Negative for difficulty urinating and hematuria. Musculoskeletal: Negative for arthralgias, joint swelling and myalgias. Skin: Negative for rash. Allergic/Immunologic: Negative for environmental allergies and food allergies. Neurological: Negative for dizziness, tremors, weakness and headaches. Psychiatric/Behavioral: Negative for behavioral problems and sleep disturbance.         :     Vitals:    07/01/22 1054   BP: 133/66   Pulse: 81   Temp: 98.3 °F (36.8 °C)   SpO2: 98%   Weight: 87 lb (39.5 kg)   Height: 5' 3\" (1.6 m)     Wt Readings from Last 3 Encounters:   07/01/22 87 lb (39.5 kg)   05/13/22 83 lb 6.4 oz (37.8 kg)   03/11/22 84 lb (38.1 kg)         Physical Exam  Constitutional:       General: She is not in acute distress. Appearance: She is well-developed. She is not diaphoretic. Comments: Thin, sitting in chair   HENT:      Head: Normocephalic and atraumatic. Nose: Nose normal.   Eyes:      Pupils: Pupils are equal, round, and reactive to light. Neck:      Thyroid: No thyromegaly. Vascular: No JVD. Trachea: No tracheal deviation. Cardiovascular:      Rate and Rhythm: Normal rate and regular rhythm. Heart sounds: No murmur heard. No friction rub. No gallop. Pulmonary:      Effort: No respiratory distress. Breath sounds: No wheezing or rales. Chest:      Chest wall: No tenderness. Abdominal:      General: There is no distension. Tenderness: There is no abdominal tenderness. There is no rebound. Musculoskeletal:         General: Normal range of motion. hospital encounter of 03/23/20    XR CHEST PORTABLE    Narrative  EXAMINATION: PORTABLE CHEST X-RAY FROM 3/23/2020 AT 17:52 HOURS    CLINICAL HISTORY: SMOKING HISTORY AND INTERMITTENT DYSPNEA X2 WEEKS    COMPARISONS: 5/19/2015    FINDINGS: The heart is not enlarged. There is mild atherosclerotic calcification and tortuosity of the aorta. There is hyperinflation of the lungs suggesting COPD. There is blunting of the right CP angle similar to the prior chest film and this likely  represents pleural scarring in the right lung base. No acute pulmonary infiltrates or pleural effusions. No pneumothorax. There is degenerative bone spurring in the spine. Impression  1. NO RADIOGRAPHIC EVIDENCE OF ACTIVE DISEASE IN THE CHEST. 2. HYPERINFLATION OF THE LUNGS SUGGESTIVE OF COPD.  ]  No results found for this or any previous visit.  ]  Results for orders placed during the hospital encounter of 03/23/20    CT CHEST WO CONTRAST    Narrative  CT of the Chest is contrast medium. History:  55 year smoker. Complains weight loss the. Rule out mass. Technical Factors:    CT imaging of the chest was obtained and formatted as 5 mm contiguous axial images from the thoracic inlet through the adrenal glands. Sagittal coronal reconstruction obtained during postprocessing. Intravenous contrast medium:  None    Comparison:  Chest radiograph March 23, 2020, May 19, 2015. Findings:    Bilateral diffuse emphysematous change. No nodules, and no masses bilaterally. Scant dependent subsegmental atelectatic change posterior right lung base. No pleural effusion. No hilar, mediastinal, or axillary lymph node enlargement. Thoracic aorta normal in course and caliber. Cardiac size normal. No pericardial effusion. Coronary calcification demonstrated. Limited imaging upper abdomen shows no gross anomalies. No osteoblastic, and no osteolytic lesions. Diffuse anterior osteophytes thoracic spine. Impression  Emphysema.     No lung

## 2022-08-03 DIAGNOSIS — E83.42 HYPOMAGNESEMIA: ICD-10-CM

## 2022-08-04 RX ORDER — CALCIUM CARBONATE/VITAMIN D3 500-10/5ML
400 LIQUID (ML) ORAL 3 TIMES DAILY
Qty: 270 CAPSULE | Refills: 3 | Status: SHIPPED | OUTPATIENT
Start: 2022-08-04

## 2022-08-13 ENCOUNTER — HOSPITAL ENCOUNTER (INPATIENT)
Age: 77
LOS: 3 days | Discharge: HOME | DRG: 682 | End: 2022-08-16
Attending: INTERNAL MEDICINE | Admitting: INTERNAL MEDICINE
Payer: MEDICARE

## 2022-08-13 DIAGNOSIS — E87.6 HYPOKALEMIA: ICD-10-CM

## 2022-08-13 DIAGNOSIS — E83.42 HYPOMAGNESEMIA: ICD-10-CM

## 2022-08-13 DIAGNOSIS — N17.9 ACUTE KIDNEY INJURY (HCC): Primary | ICD-10-CM

## 2022-08-13 LAB
ALBUMIN SERPL-MCNC: 3.7 G/DL (ref 3.5–4.6)
ALP BLD-CCNC: 72 U/L (ref 40–130)
ALT SERPL-CCNC: 9 U/L (ref 0–33)
ANION GAP SERPL CALCULATED.3IONS-SCNC: 15 MEQ/L (ref 9–15)
AST SERPL-CCNC: 17 U/L (ref 0–35)
BASOPHILS ABSOLUTE: 0.1 K/UL (ref 0–0.2)
BASOPHILS RELATIVE PERCENT: 1.7 %
BILIRUB SERPL-MCNC: 0.3 MG/DL (ref 0.2–0.7)
BUN BLDV-MCNC: 48 MG/DL (ref 8–23)
CALCIUM SERPL-MCNC: 6.4 MG/DL (ref 8.5–9.9)
CHLORIDE BLD-SCNC: 111 MEQ/L (ref 95–107)
CO2: 14 MEQ/L (ref 20–31)
CREAT SERPL-MCNC: 2.37 MG/DL (ref 0.5–0.9)
EOSINOPHILS ABSOLUTE: 0.1 K/UL (ref 0–0.7)
EOSINOPHILS RELATIVE PERCENT: 1.5 %
GFR AFRICAN AMERICAN: 24.1
GFR NON-AFRICAN AMERICAN: 19.9
GLOBULIN: 3.1 G/DL (ref 2.3–3.5)
GLUCOSE BLD-MCNC: 104 MG/DL (ref 70–99)
HCT VFR BLD CALC: 36 % (ref 37–47)
HEMOGLOBIN: 11.8 G/DL (ref 12–16)
LYMPHOCYTES ABSOLUTE: 1.4 K/UL (ref 1–4.8)
LYMPHOCYTES RELATIVE PERCENT: 17.8 %
MAGNESIUM: 0.5 MG/DL (ref 1.7–2.4)
MAGNESIUM: 1.3 MG/DL (ref 1.7–2.4)
MCH RBC QN AUTO: 33.2 PG (ref 27–31.3)
MCHC RBC AUTO-ENTMCNC: 32.8 % (ref 33–37)
MCV RBC AUTO: 101.2 FL (ref 82–100)
MONOCYTES ABSOLUTE: 0.5 K/UL (ref 0.2–0.8)
MONOCYTES RELATIVE PERCENT: 6.2 %
NEUTROPHILS ABSOLUTE: 5.7 K/UL (ref 1.4–6.5)
NEUTROPHILS RELATIVE PERCENT: 72.8 %
PDW BLD-RTO: 13.2 % (ref 11.5–14.5)
PHOSPHORUS: 4 MG/DL (ref 2.3–4.8)
PLATELET # BLD: 194 K/UL (ref 130–400)
POTASSIUM SERPL-SCNC: 3.1 MEQ/L (ref 3.4–4.9)
RBC # BLD: 3.56 M/UL (ref 4.2–5.4)
SODIUM BLD-SCNC: 140 MEQ/L (ref 135–144)
TOTAL PROTEIN: 6.8 G/DL (ref 6.3–8)
WBC # BLD: 7.9 K/UL (ref 4.8–10.8)

## 2022-08-13 PROCEDURE — 93005 ELECTROCARDIOGRAM TRACING: CPT

## 2022-08-13 PROCEDURE — 99285 EMERGENCY DEPT VISIT HI MDM: CPT

## 2022-08-13 PROCEDURE — 6370000000 HC RX 637 (ALT 250 FOR IP)

## 2022-08-13 PROCEDURE — 82607 VITAMIN B-12: CPT

## 2022-08-13 PROCEDURE — 80053 COMPREHEN METABOLIC PANEL: CPT

## 2022-08-13 PROCEDURE — G0378 HOSPITAL OBSERVATION PER HR: HCPCS

## 2022-08-13 PROCEDURE — 6360000002 HC RX W HCPCS: Performed by: INTERNAL MEDICINE

## 2022-08-13 PROCEDURE — 96365 THER/PROPH/DIAG IV INF INIT: CPT

## 2022-08-13 PROCEDURE — 6370000000 HC RX 637 (ALT 250 FOR IP): Performed by: INTERNAL MEDICINE

## 2022-08-13 PROCEDURE — 2580000003 HC RX 258: Performed by: INTERNAL MEDICINE

## 2022-08-13 PROCEDURE — 84100 ASSAY OF PHOSPHORUS: CPT

## 2022-08-13 PROCEDURE — 2580000003 HC RX 258

## 2022-08-13 PROCEDURE — 96366 THER/PROPH/DIAG IV INF ADDON: CPT

## 2022-08-13 PROCEDURE — 96375 TX/PRO/DX INJ NEW DRUG ADDON: CPT

## 2022-08-13 PROCEDURE — 96372 THER/PROPH/DIAG INJ SC/IM: CPT

## 2022-08-13 PROCEDURE — 85025 COMPLETE CBC W/AUTO DIFF WBC: CPT

## 2022-08-13 PROCEDURE — 36415 COLL VENOUS BLD VENIPUNCTURE: CPT

## 2022-08-13 PROCEDURE — 96361 HYDRATE IV INFUSION ADD-ON: CPT

## 2022-08-13 PROCEDURE — 1210000000 HC MED SURG R&B

## 2022-08-13 PROCEDURE — 6360000002 HC RX W HCPCS: Performed by: NURSE PRACTITIONER

## 2022-08-13 PROCEDURE — 83735 ASSAY OF MAGNESIUM: CPT

## 2022-08-13 PROCEDURE — 94664 DEMO&/EVAL PT USE INHALER: CPT

## 2022-08-13 PROCEDURE — 6360000002 HC RX W HCPCS

## 2022-08-13 RX ORDER — PANTOPRAZOLE SODIUM 40 MG/1
40 TABLET, DELAYED RELEASE ORAL
Status: DISCONTINUED | OUTPATIENT
Start: 2022-08-14 | End: 2022-08-16 | Stop reason: HOSPADM

## 2022-08-13 RX ORDER — SODIUM CHLORIDE 0.9 % (FLUSH) 0.9 %
5-40 SYRINGE (ML) INJECTION EVERY 12 HOURS SCHEDULED
Status: DISCONTINUED | OUTPATIENT
Start: 2022-08-13 | End: 2022-08-16 | Stop reason: HOSPADM

## 2022-08-13 RX ORDER — CLOPIDOGREL BISULFATE 75 MG/1
75 TABLET ORAL DAILY
Status: DISCONTINUED | OUTPATIENT
Start: 2022-08-13 | End: 2022-08-16 | Stop reason: HOSPADM

## 2022-08-13 RX ORDER — MAGNESIUM SULFATE IN WATER 40 MG/ML
2000 INJECTION, SOLUTION INTRAVENOUS PRN
Status: DISCONTINUED | OUTPATIENT
Start: 2022-08-13 | End: 2022-08-16 | Stop reason: HOSPADM

## 2022-08-13 RX ORDER — HYDRALAZINE HYDROCHLORIDE 20 MG/ML
10 INJECTION INTRAMUSCULAR; INTRAVENOUS EVERY 6 HOURS PRN
Status: DISCONTINUED | OUTPATIENT
Start: 2022-08-13 | End: 2022-08-16 | Stop reason: HOSPADM

## 2022-08-13 RX ORDER — ONDANSETRON 2 MG/ML
4 INJECTION INTRAMUSCULAR; INTRAVENOUS EVERY 6 HOURS PRN
Status: DISCONTINUED | OUTPATIENT
Start: 2022-08-13 | End: 2022-08-16 | Stop reason: HOSPADM

## 2022-08-13 RX ORDER — POTASSIUM CHLORIDE 7.45 MG/ML
10 INJECTION INTRAVENOUS PRN
Status: DISCONTINUED | OUTPATIENT
Start: 2022-08-13 | End: 2022-08-15

## 2022-08-13 RX ORDER — POLYETHYLENE GLYCOL 3350 17 G/17G
17 POWDER, FOR SOLUTION ORAL DAILY PRN
Status: DISCONTINUED | OUTPATIENT
Start: 2022-08-13 | End: 2022-08-16 | Stop reason: HOSPADM

## 2022-08-13 RX ORDER — ACETAMINOPHEN 650 MG/1
650 SUPPOSITORY RECTAL EVERY 6 HOURS PRN
Status: DISCONTINUED | OUTPATIENT
Start: 2022-08-13 | End: 2022-08-16 | Stop reason: HOSPADM

## 2022-08-13 RX ORDER — HEPARIN SODIUM 5000 [USP'U]/ML
5000 INJECTION, SOLUTION INTRAVENOUS; SUBCUTANEOUS 2 TIMES DAILY
Status: DISCONTINUED | OUTPATIENT
Start: 2022-08-13 | End: 2022-08-16 | Stop reason: HOSPADM

## 2022-08-13 RX ORDER — ALBUTEROL SULFATE 2.5 MG/3ML
2.5 SOLUTION RESPIRATORY (INHALATION) EVERY 6 HOURS PRN
Status: DISCONTINUED | OUTPATIENT
Start: 2022-08-13 | End: 2022-08-16 | Stop reason: HOSPADM

## 2022-08-13 RX ORDER — 0.9 % SODIUM CHLORIDE 0.9 %
1000 INTRAVENOUS SOLUTION INTRAVENOUS ONCE
Status: COMPLETED | OUTPATIENT
Start: 2022-08-13 | End: 2022-08-13

## 2022-08-13 RX ORDER — ONDANSETRON 4 MG/1
4 TABLET, ORALLY DISINTEGRATING ORAL EVERY 8 HOURS PRN
Status: DISCONTINUED | OUTPATIENT
Start: 2022-08-13 | End: 2022-08-16 | Stop reason: HOSPADM

## 2022-08-13 RX ORDER — LABETALOL HYDROCHLORIDE 5 MG/ML
10 INJECTION, SOLUTION INTRAVENOUS EVERY 4 HOURS PRN
Status: DISCONTINUED | OUTPATIENT
Start: 2022-08-13 | End: 2022-08-16 | Stop reason: HOSPADM

## 2022-08-13 RX ORDER — POTASSIUM CHLORIDE 20 MEQ/1
40 TABLET, EXTENDED RELEASE ORAL ONCE
Status: COMPLETED | OUTPATIENT
Start: 2022-08-13 | End: 2022-08-13

## 2022-08-13 RX ORDER — MIRTAZAPINE 15 MG/1
15 TABLET, ORALLY DISINTEGRATING ORAL NIGHTLY
Status: DISCONTINUED | OUTPATIENT
Start: 2022-08-13 | End: 2022-08-16 | Stop reason: HOSPADM

## 2022-08-13 RX ORDER — PROPRANOLOL HYDROCHLORIDE 20 MG/1
40 TABLET ORAL 2 TIMES DAILY
Status: DISCONTINUED | OUTPATIENT
Start: 2022-08-13 | End: 2022-08-16 | Stop reason: HOSPADM

## 2022-08-13 RX ORDER — SODIUM CHLORIDE 0.9 % (FLUSH) 0.9 %
5-40 SYRINGE (ML) INJECTION PRN
Status: DISCONTINUED | OUTPATIENT
Start: 2022-08-13 | End: 2022-08-16 | Stop reason: HOSPADM

## 2022-08-13 RX ORDER — ACETAMINOPHEN 325 MG/1
650 TABLET ORAL EVERY 6 HOURS PRN
Status: DISCONTINUED | OUTPATIENT
Start: 2022-08-13 | End: 2022-08-16 | Stop reason: HOSPADM

## 2022-08-13 RX ORDER — MAGNESIUM SULFATE IN WATER 40 MG/ML
2000 INJECTION, SOLUTION INTRAVENOUS ONCE
Status: COMPLETED | OUTPATIENT
Start: 2022-08-13 | End: 2022-08-13

## 2022-08-13 RX ORDER — SODIUM CHLORIDE 9 MG/ML
INJECTION, SOLUTION INTRAVENOUS CONTINUOUS
Status: DISCONTINUED | OUTPATIENT
Start: 2022-08-13 | End: 2022-08-15

## 2022-08-13 RX ORDER — LANOLIN ALCOHOL/MO/W.PET/CERES
400 CREAM (GRAM) TOPICAL 3 TIMES DAILY
Status: DISCONTINUED | OUTPATIENT
Start: 2022-08-13 | End: 2022-08-16 | Stop reason: HOSPADM

## 2022-08-13 RX ORDER — SODIUM CHLORIDE 9 MG/ML
INJECTION, SOLUTION INTRAVENOUS PRN
Status: DISCONTINUED | OUTPATIENT
Start: 2022-08-13 | End: 2022-08-16 | Stop reason: HOSPADM

## 2022-08-13 RX ADMIN — PROPRANOLOL HYDROCHLORIDE 40 MG: 20 TABLET ORAL at 20:06

## 2022-08-13 RX ADMIN — ACETAMINOPHEN 650 MG: 325 TABLET ORAL at 23:16

## 2022-08-13 RX ADMIN — POTASSIUM CHLORIDE 40 MEQ: 1500 TABLET, EXTENDED RELEASE ORAL at 13:49

## 2022-08-13 RX ADMIN — CLOPIDOGREL BISULFATE 75 MG: 75 TABLET ORAL at 17:28

## 2022-08-13 RX ADMIN — MAGNESIUM SULFATE HEPTAHYDRATE 2000 MG: 40 INJECTION, SOLUTION INTRAVENOUS at 20:40

## 2022-08-13 RX ADMIN — Medication 400 MG: at 20:06

## 2022-08-13 RX ADMIN — SODIUM CHLORIDE 1000 ML: 9 INJECTION, SOLUTION INTRAVENOUS at 13:47

## 2022-08-13 RX ADMIN — HEPARIN SODIUM 5000 UNITS: 5000 INJECTION INTRAVENOUS; SUBCUTANEOUS at 20:09

## 2022-08-13 RX ADMIN — MAGNESIUM SULFATE HEPTAHYDRATE 2000 MG: 40 INJECTION, SOLUTION INTRAVENOUS at 13:48

## 2022-08-13 RX ADMIN — MIRTAZAPINE 15 MG: 15 TABLET, ORALLY DISINTEGRATING ORAL at 20:06

## 2022-08-13 RX ADMIN — HYDRALAZINE HYDROCHLORIDE 10 MG: 20 INJECTION INTRAMUSCULAR; INTRAVENOUS at 22:35

## 2022-08-13 RX ADMIN — MAGNESIUM SULFATE HEPTAHYDRATE 2000 MG: 40 INJECTION, SOLUTION INTRAVENOUS at 18:42

## 2022-08-13 RX ADMIN — SODIUM CHLORIDE: 9 INJECTION, SOLUTION INTRAVENOUS at 17:28

## 2022-08-13 RX ADMIN — Medication 400 MG: at 17:28

## 2022-08-13 ASSESSMENT — ENCOUNTER SYMPTOMS
DIARRHEA: 0
VOMITING: 0
NAUSEA: 0
COUGH: 0
SHORTNESS OF BREATH: 0
ABDOMINAL PAIN: 0
PHOTOPHOBIA: 0

## 2022-08-13 ASSESSMENT — PAIN SCALES - GENERAL: PAINLEVEL_OUTOF10: 5

## 2022-08-13 ASSESSMENT — PAIN - FUNCTIONAL ASSESSMENT: PAIN_FUNCTIONAL_ASSESSMENT: NONE - DENIES PAIN

## 2022-08-13 NOTE — CARE COORDINATION
[facility] [place] Case Management Initial Discharge Assessment    Met with Patient and Family to discuss discharge plan. PCP: Toshia Villalobos MD                                Date of Last Visit: 5/13/22    [facility] Patient: No        [facility] Notified: no    If no PCP, list provided? N/A    Discharge Planning    Living Arrangements: independently at home    Who do you live with? self    Who helps you with your care:  self    If lives at home:     Do you have any barriers navigating in your home? no    Patient can perform ADL? Yes    Current Services (outpatient and in home) :  None    Dialysis: No    Is transportation available to get to your appointments? Yes    DME Equipment:  yes - rolling walker    Respiratory equipment: None    Respiratory provider:  no     Ph[facility]:  yes - [facility]    Consult with Medication Assistance Program?  No      Patient agreeable to [address]? Declined    Patient agreeable to SNF/Rehab? Declined    Other discharge needs identified? N/A    Does Patient Have a High-Risk for Readmission Diagnosis (CHF, PN, MI, COPD)? No      Initial Discharge Plan? (Note: please see concurrent daily documentation for any updates after initial note). Pt denied d/c needs in ER. She does have very supportive children. Cm to assess for further d/c needs and referrals.     Readmission Risk              Risk of Unplanned Readmission:  0         Electronically signed by Dilip Larry RN on 8/13/2022 at 3:45 PM

## 2022-08-13 NOTE — ED NOTES
Patient ambulated to bathroom and back to bed as one person assist. Pt states usually uses walker at home. Patient lives at home alone.       Beti Chavarria RN  08/13/22 8822

## 2022-08-13 NOTE — ED TRIAGE NOTES
Pt c/o bilat hand tingling and feeling \"shaky\" since last night, Pt is A&OX3, calm, afebrile, breathes are equal and unlabored,

## 2022-08-13 NOTE — ACP (ADVANCE CARE PLANNING)
Advance Care Planning     Advance Care Planning Activator (Inpatient)  Conversation Note      Date of ACP Conversation: 8/13/2022     Conversation Conducted with: Patient with Decision Making Capacity    ACP Activator: Diamond Lynn RN    Pt states that she does have a living will and that it is current with her wishes. Health Care Decision Maker:     Current Designated Health Care Decision Maker:     Primary Decision Maker: Tamiko Amador - Child - [phone]    Secondary Decision Maker: Lara Garcia - Child - [phone]    Jerson Ann is DPOA. Care Preferences    Ventilation: \"If you were in your present state of health and suddenly became very ill and were unable to breathe on your own, what would your preference be about the use of a ventilator (breathing machine) if it were available to you? \"      Would the patient desire the use of ventilator (breathing machine)?: yes    \"If your health worsens and it becomes clear that your chance of recovery is unlikely, what would your preference be about the use of a ventilator (breathing machine) if it were available to you? \"     Would the patient desire the use of ventilator (breathing machine)?: No      Resuscitation  \"CPR works best to restart the heart when there is a sudden event, like a heart attack, in someone who is otherwise healthy. Unfortunately, CPR does not typically restart the heart for people who have serious health conditions or who are very sick. \"    \"In the event your heart stopped as a result of an underlying serious health condition, would you want attempts to be made to restart your heart (answer \"yes\" for attempt to resuscitate) or would you prefer a natural death (answer \"no\" for do not attempt to resuscitate)? \" yes       [x] Yes   [] No   Educated Patient / Sonali Perez regarding differences between Advance Directives and portable DNR orders.     Length of ACP Conversation in minutes:  10    Conversation Outcomes:  [x] ACP discussion completed  [] Existing advance directive reviewed with patient; no changes to patient's previously recorded wishes  [] New Advance Directive completed  [] Portable Do Not Rescitate prepared for Provider review and signature  [] POLST/POST/MOLST/MOST prepared for Provider review and signature      Follow-up plan:    [] Schedule follow-up conversation to continue planning  [] Referred individual to Provider for additional questions/concerns   [] Advised patient/agent/surrogate to review completed ACP document and update if needed with changes in condition, patient preferences or care setting    [x] This note routed to one or more involved healthcare providers

## 2022-08-13 NOTE — PROGRESS NOTES
Assessment completed. Admission completed with answers from patient. Erika Hendrickson, child of patient, verified home medications with this RN over phone. Dr. NGUYEN [facility] [place] notified of med list completion as well as BP on arrival to floor 168/130. 2 nurses,4 eyes skin assessment done with Yu IZAGUIRRE--see flowsheets. Pt denies further needs at this time. Safety maintained in room. Comfort measures provided. Electronically signed by Zeke Chavez RN on 8/13/22 at 6:13 PM EDT    1840: Mag 1.3. Being replaced per mag PRN replacement orders.      Electronically signed by Zeke Chavez RN on 8/13/22 at 6:43 PM EDT

## 2022-08-13 NOTE — ED PROVIDER NOTES
disease) ([facility]     Dementia ([facility]     Dementia with behavioral disturbance, unspecified dementia type ([facility] 3/11/2022    Dizziness and giddiness     GERD (gastroesophageal reflux disease)     Glaucoma     Hypertension     Osteoarthritis     Pancreatitis     Seizures (HCC)     TIA (transient ischemic attack)          SURGICAL HISTORY       Past Surgical History:   Procedure Laterality Date    APPENDECTOMY      CHOLECYSTECTOMY      INTESTINAL BYPASS      jejunoileal         CURRENT MEDICATIONS       Previous Medications    ALBUTEROL SULFATE HFA (VENTOLIN HFA) 108 (90 BASE) MCG/ACT INHALER    USE 2 INHALATIONS EVERY 6 HOURS AS NEEDED FOR WHEEZING    CLONAZEPAM (KLONOPIN) 0.5 MG TABLET    TAKE 1 TABLET BY MOUTH EVERY NIGHT AS NEEDED FOR ANXIETY    CLOPIDOGREL (PLAVIX) 75 MG TABLET    TAKE 1 TABLET DAILY    CLOTRIMAZOLE-BETAMETHASONE (LOTRISONE) 1-0.05 % CREAM    APPLY TOPICALLY TWICE A DAY    LIPASE-PROTEASE-AMYLASE (CREON) 21135-80385 UNITS DELAYED RELEASE CAPSULE    TAKE 1 CAPSULE TWICE A DAY    LOSARTAN (COZAAR) 50 MG TABLET    TAKE 1 TABLET DAILY    MAGNESIUM OXIDE 400 MG CAPS    Take 400 mg by mouth 3 times daily    METRONIDAZOLE (FLAGYL) 500 MG TABLET    TAKE 1 TABLET THREE TIMES A DAY FOR 10 DAYS PER MONTH    MIRTAZAPINE (REMERON) 15 MG TABLET    TAKE 1 TABLET BY MOUTH EVERY NIGHT    MULTIPLE VITAMINS-MINERALS (MULTIVITAMIN WOMEN 50+) TABS    TAKE 1 TABLET BY MOUTH EVERY DAY    MULTIPLE VITAMINS-MINERALS (THERAPEUTIC MULTIVITAMIN-MINERALS) TABLET    Take 1 tablet by mouth daily    PANTOPRAZOLE (PROTONIX) 40 MG TABLET    TAKE 1 TABLET DAILY    PROPRANOLOL (INDERAL) 40 MG TABLET    TAKE 1 TABLET TWICE A DAY (REPLACES PREVIOUS PRESCRIPTION)    TIOTROPIUM-OLODATEROL (STIOLTO) 2.5-2.5 MCG/ACT AERS    Inhale 2 puffs into the lungs daily       ALLERGIES     Aspirin, Penicillins, and Adhesive tape    FAMILY HISTORY       Family History   Problem Relation Age of Onset    Arthritis Other     Heart Disease Other     High Blood Pressure Other     Kidney Disease Other     Other Other         respiratory problems    Breast Cancer Neg Hx     Colon Cancer Neg Hx     Diabetes Neg Hx     Cancer Neg Hx     Eclampsia Neg Hx     Ovarian Cancer Neg Hx     Hypertension Neg Hx      Labor Neg Hx     Spont Abortions Neg Hx     Stroke Neg Hx           SOCIAL HISTORY       Social History     Socioeconomic History    Marital status:      Spouse name: None    Number of children: None    Years of education: None    Highest education level: None   Tobacco Use    Smoking status: Every Day     Packs/day: 3.00     Years: 54.00     Pack years: 162.00     Types: Cigarettes    Smokeless tobacco: Never   Substance and Sexual Activity    Alcohol use: No     Alcohol/week: 0.0 standard drinks    Drug use: No    Sexual activity: Not Currently     Partners: Male     Comment:          PHYSICAL EXAM        ED Triage Vitals [22 1152]   BP Temp Temp Source Heart Rate Resp SpO2 Height Weight   (!) 157/81 98.5 °F (36.9 °C) Oral 81 16 99 % 5' 3\" (1.6 m) 83 lb (37.6 kg)       Physical Exam  Constitutional:       General: She is not in acute distress. Appearance: Normal appearance. She is not ill-appearing, toxic-appearing or diaphoretic. HENT:      Head: Normocephalic and atraumatic. Right Ear: External ear normal.      Left Ear: External ear normal.      Nose: Nose normal.      Mouth/Throat:      Mouth: Mucous membranes are moist.      Pharynx: Oropharynx is clear. Eyes:      Extraocular Movements: Extraocular movements intact. Cardiovascular:      Rate and Rhythm: Normal rate and regular rhythm. Pulmonary:      Effort: Pulmonary effort is normal. No respiratory distress. Breath sounds: Normal breath sounds. Abdominal:      General: Bowel sounds are normal.      Palpations: Abdomen is soft. Tenderness: There is no abdominal tenderness. Musculoskeletal:         General: Normal range of motion.       Cervical back: Normal range of motion. Skin:     General: Skin is warm. Neurological:      Mental Status: She is alert and oriented to person, place, and time. GCS: GCS eye subscore is 4. GCS verbal subscore is 5. GCS motor subscore is 6. Cranial Nerves: Cranial nerves are intact. Sensory: Sensation is intact. No sensory deficit. Motor: Motor function is intact. Coordination: Coordination is intact. Gait: Gait is intact. Psychiatric:         Mood and Affect: Mood normal.         Behavior: Behavior normal.         LABS:  Labs Reviewed   COMPREHENSIVE METABOLIC PANEL - Abnormal; Notable for the following components:       Result Value    Potassium 3.1 (*)     Chloride 111 (*)     CO2 14 (*)     Glucose 104 (*)     BUN 48 (*)     Creatinine 2.37 (*)     GFR Non- 19.9 (*)     GFR  24.1 (*)     Calcium 6.4 (*)     All other components within normal limits   CBC WITH AUTO DIFFERENTIAL - Abnormal; Notable for the following components:    RBC 3.56 (*)     Hemoglobin 11.8 (*)     Hematocrit 36.0 (*)     .2 (*)     MCH 33.2 (*)     MCHC 32.8 (*)     All other components within normal limits   MAGNESIUM - Abnormal; Notable for the following components:    Magnesium 0.5 (*)     All other components within normal limits    Narrative:     CALL  [place]  [facility] tel. [phone],  Magnesium results called to and read back by Rissa BEATTY RN, 08/13/2022  13:11, by [facility]   VITAMIN B12     EKG NSR HR 77 QT/Qtc 408/461 no acute st elevations or depressions     MDM:   Vitals:    Vitals:    08/13/22 1152 08/13/22 1230 08/13/22 1400   BP: (!) 157/81 (!) 161/87 (!) 172/95   Pulse: 81 70 75   Resp: 16 16 18   Temp: 98.5 °F (36.9 °C)     TempSrc: Oral     SpO2: 99% 98% 99%   Weight: 83 lb (37.6 kg)     Height: 5' 3\" (1.6 m)         64-year-old female patient to the emergency department with bilateral finger and toe tingling.   No other complaints or deficits on exam. Patient with history of hypomagnesemia takes repletion outpatient she does have dementia and admits she may forget to take her repletion sometimes. Patient magnesium today is 0.5. Patient again not complaining of any seizure-like activity, tetany symptoms. EKG with regular intervals and no arrhythmia. Hyper kalemia is also present at 3.1. Patient also with DEION on CKD, Cr today 2.37, BUN 48. Patient will be admitted for her acute renal failure and electrolyte disturbance. Dr. NGUYEN [facility] [employer] accepting. CRITICAL CARE TIME   Total CriticalCare time was 0 minutes, excluding separately reportable procedures. There was a high probability of clinically significant/life threatening deterioration in the patient's condition which required my urgent intervention. PROCEDURES:  Unlessotherwise noted below, none      Procedures      FINAL IMPRESSION      1. Acute kidney injury ([address]    2. Hypomagnesemia    3.  Hypokalemia          DISPOSITION/PLAN   DISPOSITION Decision To Admit 08/13/2022 02:37:45 PM          RAMAN Bruner (electronically signed)  Attending Emergency Physician          Stiven Brunerma  08/13/22 6716

## 2022-08-13 NOTE — H&P
Department of Internal Medicine  General Internal Medicine  Attending History and Physical      CHIEF COMPLAINT:  Bilateral UE paresthesias    Reason for Admission:  Hypomagnesemia, DEION    History Obtained From:  patient    HISTORY OF PRESENT ILLNESS:      The patient is a 69 y.o. female with significant past medical history of CKD3, dementia, hx of gastric bypass in , hx of TIA, HTN/HLD who presents with bilateral hand paresthesias which started yesterday but worsened today when her children came over to visit. States she had restlessness all night and was unable to sleep. States her appetite has not been good for years since her   about 3 years ago. Doesn't drink water at all and states she drinks about 4 cups of coffee a day. Regular strength and does not drink decaf. Daughter states she does not eat sometimes and is getting weaker and more forgetful. Thinks she may be skipping medications as they are not around 24 hours but do check on her frequently. In the ED, Mg noted to be 0.5. Pt was given 2g Mg with improvement in her paresthesias. Cr noted to be 2.37 (baseline ~1.5). Potassium and phos were unremarkable. Pt admitted for DEION and low Mg.     Past Medical History:        Diagnosis Date    DEION (acute kidney injury) ([address] 2022    Bowel disease     Cataract     Chronic back pain     Chronic renal disease, stage III [facility]) [[id number]] 2022    COPD (chronic obstructive pulmonary disease) (HCC)     Dementia (HCC)     Dementia with behavioral disturbance, unspecified dementia type ([address] 3/11/2022    Dizziness and giddiness     GERD (gastroesophageal reflux disease)     Glaucoma     Hypertension     Osteoarthritis     Pancreatitis     Seizures (HCC)     TIA (transient ischemic attack)      Past Surgical History:        Procedure Laterality Date    APPENDECTOMY      CHOLECYSTECTOMY      INTESTINAL BYPASS      jejunoileal         Medications Prior to Admission:    Medications Prior to Admission: Magnesium Oxide 400 MG CAPS, Take 400 mg by mouth 3 times daily  albuterol sulfate HFA (VENTOLIN HFA) 108 (90 Base) MCG/ACT inhaler, USE 2 INHALATIONS EVERY 6 HOURS AS NEEDED FOR WHEEZING  tiotropium-olodaterol (STIOLTO) 2.5-2.5 MCG/ACT AERS, Inhale 2 puffs into the lungs daily  clonazePAM (KLONOPIN) 0.5 MG tablet, TAKE 1 TABLET BY MOUTH EVERY NIGHT AS NEEDED FOR ANXIETY  Multiple Vitamins-Minerals (MULTIVITAMIN WOMEN 50+) TABS, TAKE 1 TABLET BY MOUTH EVERY DAY  metroNIDAZOLE (FLAGYL) 500 MG tablet, TAKE 1 TABLET THREE TIMES A DAY FOR 10 DAYS PER MONTH  losartan (COZAAR) 50 MG tablet, TAKE 1 TABLET DAILY  clopidogrel (PLAVIX) 75 MG tablet, TAKE 1 TABLET DAILY  lipase-protease-amylase (CREON) 50602-27816 units delayed release capsule, TAKE 1 CAPSULE TWICE A DAY  mirtazapine (REMERON) 15 MG tablet, TAKE 1 TABLET BY MOUTH EVERY NIGHT  pantoprazole (PROTONIX) 40 MG tablet, TAKE 1 TABLET DAILY  propranolol (INDERAL) 40 MG tablet, TAKE 1 TABLET TWICE A DAY (REPLACES PREVIOUS PRESCRIPTION)  clotrimazole-betamethasone (LOTRISONE) 1-0.05 % cream, APPLY TOPICALLY TWICE A DAY  Multiple Vitamins-Minerals (THERAPEUTIC MULTIVITAMIN-MINERALS) tablet, Take 1 tablet by mouth daily    Allergies:  Aspirin, Penicillins, and Adhesive tape    Social History:   Tobacco use, no etoh, no drugs    Family History:       Problem Relation Age of Onset    Arthritis Other     Heart Disease Other     High Blood Pressure Other     Kidney Disease Other     Other Other         respiratory problems    Breast Cancer Neg Hx     Colon Cancer Neg Hx     Diabetes Neg Hx     Cancer Neg Hx     Eclampsia Neg Hx     Ovarian Cancer Neg Hx     Hypertension Neg Hx      Labor Neg Hx     Spont Abortions Neg Hx     Stroke Neg Hx      REVIEW OF SYSTEMS:  12 point ROS was negative unless otherwise noted in the HPI   PHYSICAL EXAM:    Vitals:  BP (!) 157/81   Pulse 78   Temp 98.5 °F (36.9 °C) (Oral)   Resp 18   Ht 5' 3\" (1.6 m)   Wt 83 lb (37.6 kg)   LMP (LMP Unknown)   SpO2 100%   BMI 14.70 kg/m²     Constitutional: Awake and alert in no acute distress. Lying in bed comfortably  Head: Normocephalic, atraumatic  Eyes: EOMI, PERRLA  ENT: moist mucous membranes  Neck: neck supple, trachea midline  Lungs: Good inspiratory effort, no wheeze, no rhonchi, no rales  Heart: RRR, normal S1 and S2  GI: Soft, non-distended, +BS  MSK: no edema noted  Skin: warm, dry  Neuro: no focal deficits  Psych: appropriate affect     DATA:  CBC:   Lab Results   Component Value Date/Time    WBC 7.9 08/13/2022 12:30 PM    RBC 3.56 08/13/2022 12:30 PM    HGB 11.8 08/13/2022 12:30 PM    HCT 36.0 08/13/2022 12:30 PM    .2 08/13/2022 12:30 PM    MCH 33.2 08/13/2022 12:30 PM    MCHC 32.8 08/13/2022 12:30 PM    RDW 13.2 08/13/2022 12:30 PM     08/13/2022 12:30 PM    MPV 8.4 11/11/2014 03:14 PM     CMP:    Lab Results   Component Value Date/Time     08/13/2022 12:30 PM    K 3.1 08/13/2022 12:30 PM    K 4.4 03/25/2020 05:51 AM     08/13/2022 12:30 PM    CO2 14 08/13/2022 12:30 PM    BUN 48 08/13/2022 12:30 PM    CREATININE 2.37 08/13/2022 12:30 PM    GFRAA 24.1 08/13/2022 12:30 PM    LABGLOM 19.9 08/13/2022 12:30 PM    GLUCOSE 104 08/13/2022 12:30 PM    PROT 6.8 08/13/2022 12:30 PM    LABALBU 3.7 08/13/2022 12:30 PM    CALCIUM 6.4 08/13/2022 12:30 PM    BILITOT 0.3 08/13/2022 12:30 PM    ALKPHOS 72 08/13/2022 12:30 PM    AST 17 08/13/2022 12:30 PM    ALT 9 08/13/2022 12:30 PM     ASSESSMENT AND PLAN:      # DEION with hypomagnesemia and associated bilateral UE paresthesias  - due to poor po intake, dehydration, possible medication noncompliance in the setting of dementia  - supposed to be taking Mg supplements TID - family unsure if she is taking  - Mg 0.5 in the ED. Given 2g. Will recheck and replace as needed  - paresthesias improved with Mg replacement  - Cr  2.37 (baseline 1.5)  - cont IVF and monitor.  Replace electrolytes as needed  - holding nephrotoxic meds    # Dementia  - monitor  - PT/OT    # HTN  - cont home meds. Holding ARB due to DEION    # COPD  - cont home regimen    DVT: heparin ppx    Disposition: Admitted with DEION and low Mg. Replacing as needed. IVF. May need to increase scheduled Mg supplementation. PT/OT. Anticipated length of stay greater than 48 hours.       Jenni Quiñones DO  Internal Medicine   Hospitalist    >45 minutes in total care time

## 2022-08-13 NOTE — PROGRESS NOTES
[facility] AT [place] Respiratory Therapy Evaluation   Current Order:  ALBUTEROL Q6 PRN      Home Regimen: PRN      Ordering Physician: MEHREEN  Re-evaluation Date:  N/A     Diagnosis: DEION      Patient Status: Stable / Unstable + Physician notified    The following MDI Criteria must be met in order to convert aerosol to MDI with spacer. If unable to meet, MDI will be converted to aerosol:  []  Patient able to demonstrate the ability to use MDI effectively  []  Patient alert and cooperative  []  Patient able to take deep breath with 5-10 second hold  []  Medication(s) available in this delivery method   []  Peak flow greater than or equal to 200 ml/min            Current Order Substituted To  (same drug, same frequency)   Aerosol to MDI [] Albuterol Sulfate 0.083% unit dose by aerosol Albuterol Sulfate MDI 2 puffs by inhalation with spacer    [] Levalbuterol 1.25 mg unit dose by aerosol Levalbuterol MDI 2 puffs by inhalation with spacer    [] Levalbuterol 0.63 mg unit dose by aerosol Levalbuterol MDI 2 puffs by inhalation with spacer    [] Ipratropium Bromide 0.02% unit dose by aerosol Ipratropium Bromide MDI 2 puffs by inhalation with spacer    [] Duoneb (Ipratropium + Albuterol) unit dose by aerosol Ipratropium MDI + Albuterol MDI 2 puffs by inhalation w/spacer   MDI to Aerosol [] Albuterol Sulfate MDI Albuterol Sulfate 0.083% unit dose by aerosol    [] Levalbuterol MDI 2 puffs by inhalation Levalbuterol 1.25 mg unit dose by aerosol    [] Ipratropium Bromide MDI by inhalation Ipratropium Bromide 0.02% unit dose by aerosol    [] Combivent (Ipratropium + Albuterol) MDI by inhalation Duoneb (Ipratropium + Albuterol) unit dose by aerosol       Treatment Assessment [Frequency/Schedule]:  Change frequency to: ________NO CHANGES TO CURRENT ORDER__________________________________________per Protocol, P&T, MEC      Points 0 1 2 3 4   Pulmonary Status  Non-Smoker  []   Smoking history   < 20 pack years  []   Smoking history  ?  21 pack years  []   Pulmonary Disorder  (acute or chronic)  [x]   Severe or Chronic w/ Exacerbation  []     Surgical Status No [x]   Surgeries     General []   Surgery Lower []   Abdominal Thoracic or []   Upper Abdominal Thoracic with  PulmonaryDisorder  []     Chest X-ray Clear/Not  Ordered     [x]  Chronic Changes  Results Pending  []  Infiltrates, atelectasis, pleural effusion, or edema  []  Infiltrates in more than one lobe []  Infiltrate + Atelectasis, &/or pleural effusion  []    Respiratory Pattern Regular,  RR = 12-20 [x]  Increased,  RR = 21-25 []  GUDINO, irregular,  or RR = 26-30 []  Decreased FEV1  or RR = 31-35 []  Severe SOB, use  of accessory muscles, or RR ? 35  []    Mental Status Alert, oriented,  Cooperative []  Confused but Follows commands [x]  Lethargic or unable to follow commands []  Obtunded  []  Comatose  []    Breath Sounds Clear to  auscultation  [x]  Decreased unilaterally or  in bases only []  Decreased  bilaterally  []  Crackles or intermittent wheezes []  Wheezes []    Cough Strong, Spontan., & nonproductive [x]  Strong,  spontaneous, &  productive []  Weak,  Nonproductive []  Weak, productive or  with wheezes []  No spontaneous  cough or may require suctioning []    Level of Activity Ambulatory []  Ambulatory w/ Assist  [x]  Non-ambulatory []  Paraplegic []  Quadriplegic []    Total    Score:____5___     Triage Score:___5_____      Tri       Triage:     1. (>20) Freq: Q3    2. (16-20) Freq: Q4   3. (11-15) Freq: QID & Albuterol Q2 PRN    4. (6-10) Freq: TID & Albuterol Q2 PRN    5. (0-5) Freq Q4prn

## 2022-08-14 LAB
ANION GAP SERPL CALCULATED.3IONS-SCNC: 16 MEQ/L (ref 9–15)
BASOPHILS ABSOLUTE: 0 K/UL (ref 0–0.2)
BASOPHILS RELATIVE PERCENT: 0.5 %
BUN BLDV-MCNC: 35 MG/DL (ref 8–23)
CALCIUM SERPL-MCNC: 6.1 MG/DL (ref 8.5–9.9)
CHLORIDE BLD-SCNC: 118 MEQ/L (ref 95–107)
CO2: 11 MEQ/L (ref 20–31)
CREAT SERPL-MCNC: 1.86 MG/DL (ref 0.5–0.9)
EOSINOPHILS ABSOLUTE: 0.2 K/UL (ref 0–0.7)
EOSINOPHILS RELATIVE PERCENT: 2.4 %
GFR AFRICAN AMERICAN: 31.8
GFR NON-AFRICAN AMERICAN: 26.3
GLUCOSE BLD-MCNC: 125 MG/DL (ref 70–99)
HCT VFR BLD CALC: 36.1 % (ref 37–47)
HEMOGLOBIN: 11.8 G/DL (ref 12–16)
LYMPHOCYTES ABSOLUTE: 1.1 K/UL (ref 1–4.8)
LYMPHOCYTES RELATIVE PERCENT: 16.5 %
MAGNESIUM: 2.5 MG/DL (ref 1.7–2.4)
MCH RBC QN AUTO: 33.5 PG (ref 27–31.3)
MCHC RBC AUTO-ENTMCNC: 32.9 % (ref 33–37)
MCV RBC AUTO: 101.9 FL (ref 82–100)
MONOCYTES ABSOLUTE: 0.6 K/UL (ref 0.2–0.8)
MONOCYTES RELATIVE PERCENT: 9.2 %
NEUTROPHILS ABSOLUTE: 4.9 K/UL (ref 1.4–6.5)
NEUTROPHILS RELATIVE PERCENT: 71.4 %
PDW BLD-RTO: 13.3 % (ref 11.5–14.5)
PLATELET # BLD: 172 K/UL (ref 130–400)
POTASSIUM REFLEX MAGNESIUM: 3.3 MEQ/L (ref 3.4–4.9)
RBC # BLD: 3.54 M/UL (ref 4.2–5.4)
SODIUM BLD-SCNC: 145 MEQ/L (ref 135–144)
VITAMIN B-12: 712 PG/ML (ref 232–1245)
WBC # BLD: 6.8 K/UL (ref 4.8–10.8)

## 2022-08-14 PROCEDURE — 36415 COLL VENOUS BLD VENIPUNCTURE: CPT

## 2022-08-14 PROCEDURE — 97166 OT EVAL MOD COMPLEX 45 MIN: CPT

## 2022-08-14 PROCEDURE — 6370000000 HC RX 637 (ALT 250 FOR IP): Performed by: INTERNAL MEDICINE

## 2022-08-14 PROCEDURE — 92610 EVALUATE SWALLOWING FUNCTION: CPT

## 2022-08-14 PROCEDURE — 6360000002 HC RX W HCPCS: Performed by: NURSE PRACTITIONER

## 2022-08-14 PROCEDURE — G0378 HOSPITAL OBSERVATION PER HR: HCPCS

## 2022-08-14 PROCEDURE — 2580000003 HC RX 258: Performed by: INTERNAL MEDICINE

## 2022-08-14 PROCEDURE — 96372 THER/PROPH/DIAG INJ SC/IM: CPT

## 2022-08-14 PROCEDURE — 94761 N-INVAS EAR/PLS OXIMETRY MLT: CPT

## 2022-08-14 PROCEDURE — 1210000000 HC MED SURG R&B

## 2022-08-14 PROCEDURE — 6360000002 HC RX W HCPCS: Performed by: INTERNAL MEDICINE

## 2022-08-14 PROCEDURE — 96361 HYDRATE IV INFUSION ADD-ON: CPT

## 2022-08-14 PROCEDURE — 96376 TX/PRO/DX INJ SAME DRUG ADON: CPT

## 2022-08-14 PROCEDURE — 85025 COMPLETE CBC W/AUTO DIFF WBC: CPT

## 2022-08-14 PROCEDURE — 97162 PT EVAL MOD COMPLEX 30 MIN: CPT

## 2022-08-14 PROCEDURE — 83735 ASSAY OF MAGNESIUM: CPT

## 2022-08-14 PROCEDURE — 94640 AIRWAY INHALATION TREATMENT: CPT

## 2022-08-14 PROCEDURE — 96366 THER/PROPH/DIAG IV INF ADDON: CPT

## 2022-08-14 PROCEDURE — 80048 BASIC METABOLIC PNL TOTAL CA: CPT

## 2022-08-14 RX ADMIN — SODIUM CHLORIDE, PRESERVATIVE FREE 10 ML: 5 INJECTION INTRAVENOUS at 20:02

## 2022-08-14 RX ADMIN — MIRTAZAPINE 15 MG: 15 TABLET, ORALLY DISINTEGRATING ORAL at 20:01

## 2022-08-14 RX ADMIN — PROPRANOLOL HYDROCHLORIDE 40 MG: 20 TABLET ORAL at 09:21

## 2022-08-14 RX ADMIN — Medication 400 MG: at 09:21

## 2022-08-14 RX ADMIN — SODIUM CHLORIDE: 9 INJECTION, SOLUTION INTRAVENOUS at 02:58

## 2022-08-14 RX ADMIN — Medication 400 MG: at 20:01

## 2022-08-14 RX ADMIN — HEPARIN SODIUM 5000 UNITS: 5000 INJECTION INTRAVENOUS; SUBCUTANEOUS at 09:21

## 2022-08-14 RX ADMIN — HEPARIN SODIUM 5000 UNITS: 5000 INJECTION INTRAVENOUS; SUBCUTANEOUS at 20:01

## 2022-08-14 RX ADMIN — SODIUM CHLORIDE, PRESERVATIVE FREE 10 ML: 5 INJECTION INTRAVENOUS at 09:21

## 2022-08-14 RX ADMIN — TIOTROPIUM BROMIDE INHALATION SPRAY 2 PUFF: 3.12 SPRAY, METERED RESPIRATORY (INHALATION) at 07:17

## 2022-08-14 RX ADMIN — CLOPIDOGREL BISULFATE 75 MG: 75 TABLET ORAL at 09:21

## 2022-08-14 RX ADMIN — SODIUM CHLORIDE: 9 INJECTION, SOLUTION INTRAVENOUS at 22:54

## 2022-08-14 RX ADMIN — PROPRANOLOL HYDROCHLORIDE 40 MG: 20 TABLET ORAL at 20:01

## 2022-08-14 RX ADMIN — PANTOPRAZOLE SODIUM 40 MG: 40 TABLET, DELAYED RELEASE ORAL at 05:53

## 2022-08-14 RX ADMIN — HYDRALAZINE HYDROCHLORIDE 10 MG: 20 INJECTION INTRAMUSCULAR; INTRAVENOUS at 20:01

## 2022-08-14 NOTE — CARE COORDINATION
Met with patient at bedside. D/C plan is to go home. Patient states she has a lot of support from her son and daughter. Son and daughter provide meals and groceries for patient. Per patient, her son sets up her medications in a pill container weekly. CM/SW to continue to follow for d/c planning needs.

## 2022-08-14 NOTE — PROGRESS NOTES
Patient resting in bed. Complaints of mild pain in right big toe. Medicated per MAR. Has history of dementia and schizophrenia. Writer has seen no signs of this tonight. No further needs. Call light in reach.

## 2022-08-14 NOTE — PROGRESS NOTES
Physical Therapy Med Surg Initial Assessment  Facility/Department: [facility]  Room: [place] [id number]       NAME: Jordan Wallace  :  (12 y.o.)  MRN: [id number]  CODE STATUS: Full Code    Date of Service: 2022    Patient Diagnosis(es): Hypokalemia [E87.6]  Hypomagnesemia [E83.42]  DEION (acute kidney injury) ([address] [N17.9]  Acute kidney injury [facility]) [N17.9]   Chief Complaint   Patient presents with    Numbness     Pt c/o bilat hand tingling and feeling \"shaky' since last night     Patient Active Problem List    Diagnosis Date Noted    DEION (acute kidney injury) ([address] 2022    Chronic renal disease, stage III ([address] [[id number]] 2022    Dementia with behavioral disturbance, unspecified dementia type ([address] 2022    Shortness of breath 10/22/2020    Tobacco abuse 10/22/2020    Postmenopausal bleeding 2020    COPD exacerbation ([address] 2020    Anxiety 2019    Cobalamin deficiency 10/25/2018    Chronic obstructive pulmonary disease ([address] 2016    History of disease 2016    Chronic obstructive lung disease ([address] 2016    Gastroesophageal reflux disease     Hypertensive disorder 2015    Cortical senile cataract 2014    Macular degeneration, age related, nonexudative 2014    Nuclear senile cataract 2014    Venous tributary (branch) occlusion of retina 2014        Past Medical History:   Diagnosis Date    DEION (acute kidney injury) ([address] 2022    Bowel disease     Cataract     Chronic back pain     Chronic renal disease, stage III [facility]) [[id number]] 2022    COPD (chronic obstructive pulmonary disease) ([address]     Dementia ([facility]     Dementia with behavioral disturbance, unspecified dementia type ([facility] 3/11/2022    Dizziness and giddiness     GERD (gastroesophageal reflux disease)     Glaucoma     Hypertension     Osteoarthritis     Pancreatitis     Seizures ([address]     TIA (transient ischemic attack)      Past Surgical History:   Procedure Laterality Date    APPENDECTOMY      CHOLECYSTECTOMY      INTESTINAL BYPASS      jejunoileal       Chart Reviewed: Yes    Restrictions:  Restrictions/Precautions: Fall Risk     SUBJECTIVE:   Subjective: Pt reports walking better at home with her 3WW vs the 2 WW here.     Pain   Pre/post pain: 2-3/10 - generalized arthritis pain    Prior Level of Function:  Social/Functional History  Lives With: Alone  Type of Home: House  Home Layout: One level  Home Access: Level entry  Bathroom Shower/Tub: Walk-in shower (Washes up at sink)  Bathroom Equipment: Grab bars in shower  Home Equipment:  (3WW)  Receives Help From: Family  ADL Assistance: Independent  Homemaking Assistance: Independent  Ambulation Assistance: Independent  Transfer Assistance: Independent  Active : Yes    OBJECTIVE:   Vision  Vision: Impaired  Vision Exceptions: Cataracts  Hearing: Exceptions to [id number] [facility] [employer] [facility] [employer]  Hearing Exceptions: Hard of hearing/hearing concerns    Cognition:  Overall Orientation Status: Within Functional Limits  Follows Commands: Within Functional Limits  Overall Cognitive Status: Exceptions         ROM:  RLE AROM: WFL  LLE AROM : WFL    Strength:  Strength RLE  Comment: >/= 3+/5 functionally assessed  Strength LLE  Comment: >/= 3+/5 functionally assessed    Neuro:  Balance  Sitting - Static: Good  Sitting - Dynamic: Good  Standing - Static: Fair (at [employer])  Standing - Dynamic: Fair (at [employer])      Bed mobility  Supine to Sit: Stand by assistance  Sit to Supine: Stand by assistance    Transfers  Sit to Stand: Stand by assistance  Stand to sit: Stand by assistance    Ambulation  Surface: level tile  Device: Rolling Walker  Assistance: Stand by assistance;Contact guard assistance  Quality of Gait: decreased safety awareness at times  Gait Deviations: Slow Le;Decreased step length  Distance: 10' x2      Activity Tolerance  Activity Tolerance: Patient tolerated evaluation without incident    ASSESSMENT:   Body Structures, Functions, Activity Limitations Requiring Skilled Therapeutic Intervention: Decreased functional mobility ; Decreased strength;Decreased safe awareness;Decreased balance  Decision Making: Medium Complexity  History: high  Exam: med  Clinical Presentation: med    Therapy Prognosis: Good      DISCHARGE RECOMMENDATIONS:  Discharge Recommendations: Continue to assess pending progress, Patient would benefit from continued therapy after discharge    Assessment: Pt assessed for mobiity needs while in the inpatient setting. Pt demonstrates decreased functionall strength in adryan LEs with good ROM. Pt appears impulsive throughout evaluation with decreased safety awareness. Pt ambulates with a [employer] with a slow gait speed and occasional vc's to stay within [employer]. Pt would benefit from further skilled PT to improve strength, balance and safety with all mobility to allow her to safely return home. Requires PT Follow-Up: Yes      PLAN OF CARE:  Plan  Plan: 1 time a day 3-6 times a week  Current Treatment Recommendations: Strengthening, Balance training, Functional mobility training, Transfer training, Gait training, Neuromuscular re-education, Home exercise program, Safety education & training, Patient/Caregiver education & training, Equipment evaluation, education, & procurement    Safety Devices  Type of Devices: All fall risk precautions in place, Bed alarm in place, Call light within reach    Goals:  Long Term Goals  Long term goal 1: Pt will be independent and safe with all bed mobility and transfers. Long term goal 2: Pt will ambulate with LRD >/= 150' to allow her to safely return home. Long term goal 3: Pt will demonstrate good static and dynamic standing balance with standing withut UE suppoty >/= 2' with good safety to reduce her risk for falls at home. Long term goal 4: Improve adryan LE strength to achieve all goals and increase ease with mobility.     [facility] (6 CLICK) BASIC MOBILITY  AM-PAC Inpatient Mobility Raw Score : 21     Therapy Time:   Individual   Time In [id number]   Time Out 0944   Minutes [address] [place], 08/14/22 at 9:53 AM         Definitions for assistance levels  Independent = pt does not require any physical supervision or assistance from another person for activity completion. Device may be needed.   Stand by assistance = pt requires verbal cues or instructions from another person, close to but not touching, to perform the activity  Minimal assistance= pt performs 75% or more of the activity; assistance is required to complete the activity  Moderate assistance= pt performs 50% of the activity; assistance is required to complete the activity  Maximal assistance = pt performs 25% of the activity; assistance is required to complete the activity  Dependent = pt requires total physical assistance to accomplish the task

## 2022-08-14 NOTE — PROGRESS NOTES
History:   Procedure Laterality Date    APPENDECTOMY      CHOLECYSTECTOMY      INTESTINAL BYPASS      jejunoileal     Allergies   Allergen Reactions    Aspirin      Bothers stomach    Penicillins      Unable to take    Adhesive Tape Rash     Patient states Geovanna Bennett works the best       DATE ONSET: 8/13/2022    Date of Evaluation: 8/14/2022   Evaluating Therapist: Ruddy Emmanuel SLP    Dysphagia Diagnosis  Dysphagia Diagnosis: Mild oral stage dysphagia  Dysphagia Impression : Pt presents with mild oral stage dysphagia characterized by minimal lingual residue of solid consistencies following the swallow, cleared with liquid wash. No overt s/s of aspiration observed with consistencies presented. Pt consumed 3 oz of water with consecutive sips and no cough/throat clear. Pt's dentures noted on her bedside table. Pt reports she eats with dentures \"depending on what she is eating. \" Pt noted she had difficulty chewing bonds at breakfast. At home she modifies solids to ensure they are easy to chew. Recommend EASY TO CHEW/thin liquid diet. Recommended Diet  Recommendations: Dysphagia treatment  Diet Solids Recommendation: Easy to Chew  Liquid Consistency Recommendation: Thin  Recommended Form of Meds: PO  Compensatory Swallowing Strategies : Alternate solids and liquids;Upright as possible for all oral intake      Reason for Referral  Marianna Arguelles was referred for a bedside swallow evaluation to assess the efficiency of her swallow function, identify signs and symptoms of aspiration, identify risk factors, and make recommendations regarding safe dietary consistencies, effective compensatory strategies, and safe eating environment. General  Subjective  Subjective: Pt admitted 8/13 complaining of bilateral finger and toe tingling. Hx of dementia and COPD. Behavior/Cognition: Cooperative;Pleasant mood; Alert  Respiratory Status: Room air  O2 Device: None (Room air)  Communication Observation: Functional  Follows Directions: Simple  Dentition: Dentures top;Dentures bottom  Patient Positioning: Upright in bed  Baseline Vocal Quality: Normal  Prior Dysphagia History: Per chart review, none. Consistencies Administered: Regular; Thin    Vision and Hearing  Vision  Vision Exceptions: Cataracts  Hearing  Hearing: Exceptions to [facility]  Hearing Exceptions: Hard of hearing/hearing concerns    Current Diet level  Current Diet : Regular  Current Liquid Diet : Thin    Oral Motor  Labial: No impairment  Dentition: Upper & lower dentures;Edentulous  Oral Hygiene: Dried secretions  Lingual: No impairment    Oral/Pharyngeal Phase  Oral Phase - Comment: Minimal lingual residue observed following solid trial, cleared with liquid wash. Prolonged mastication of marline cracker. Pt not wearing dentures during PO trials. Pharyngeal Phase: Suspect WFL. PO Trials  Assessment Method(s): Observation  Patient Position: Upright in bed  Vocal Quality: No Impairment  Consistency Presented: Regular; Thin (Pt declined puree)  How Presented: Self-fed/presented  How much presented:  (2 sips of water, 3 oz water, 1 bite marline cracker)  Bolus Acceptance: No impairment  Bolus Formation/Control: No impairment  Propulsion: No impairment  Oral Residue: Less than 10% of bolus; Lingual  Laryngeal Elevation:  (Observed)  Aspiration Signs/Symptoms: None  Pharyngeal Phase Characteristics: No impairment, issues, or problems  Effective Modifications: None      Dysphagia Diagnosis  Dysphagia Diagnosis: Mild oral stage dysphagia  Dysphagia Impression : Pt presents with mild oral stage dysphagia characterized by minimal lingual residue of solid consistencies following the swallow, cleared with liquid wash. No overt s/s of aspiration observed with consistencies presented. Pt consumed 3 oz of water with consecutive sips and no cough/throat clear. Pt's dentures noted on her bedside table. Pt reports she eats with dentures \"depending on what she is eating. \" Pt noted she had difficulty chewing bonds at breakfast. At home she modifies solids to ensure they are easy to chew. Recommend EASY TO CHEW/thin liquid diet. Dysphagia Outcome Severity Scale: Level 6: Within functional limits/Modified independence     Recommendations  Requires SLP Intervention: Yes  Recommendations: Dysphagia treatment  D/C Recommendations: No follow up therapy recommended post discharge  Diet Solids Recommendation: Easy to Chew  Liquid Consistency Recommendation: Thin  Compensatory Swallowing Strategies : Alternate solids and liquids;Upright as possible for all oral intake  Recommended Form of Meds: PO  Therapeutic Interventions: Diet tolerance monitoring  Duration of Treatment: 1 week or until goals are met  Frequency of Treatment: 1x/week    Prognosis  Speech Therapy Prognosis  Prognosis: Good  Prognosis Considerations: Previous Level of Function    Education  Individuals consulted  Consulted and agree with results and recommendations: DMITRIY  RN Name: Ilir Hi    Treatment/Goals  Short-term Goals  Timeframe for Short-term Goals: 1 week  Goal 1: Pt will complete meal monitor with SLP to assess tolerance of easy to chew diet. Long-term Goals  Timeframe for Long-term Goals: 1 week  Goal 1: Pt will tolerate least restrictive diet with no adverse outcomes. Safety Devices  Safety Devices  Safety Devices in place: Yes  Type of devices: All fall risk precautions in place; Bed alarm in place;Call light within reach  Restraints Initially in Place: No    Pain Assessment  Patient does not c/o pain. Pain Re-assessment  Patient does not c/o pain.       Therapy Time  SLP Individual Minutes  Time In: 1000  Time Out: 1342  Minutes: 15              Signature: Electronically signed by SAUL Paniagua on 8/14/2022 at 10:38 AM

## 2022-08-14 NOTE — PROGRESS NOTES
[facility] OCCUPATIONAL THERAPY EVALUATION - ACUTE     NAME: Meghna Boothe  : 1852 (3 y.o.)  MRN: [id number]  CODE STATUS: Full Code  [facility]    Date of Service: 2022    Patient Diagnosis(es): Hypokalemia [E87.6]  Hypomagnesemia [E83.42]  [facility] [N17.9]  Acute kidney injury [facility]) [N17.9]   Patient Active Problem List    Diagnosis Date Noted    [facility] ([facility]) [facility] 2022    Chronic renal disease, stage III [facility]) [601995] 2022    Dementia with behavioral disturbance, unspecified dementia type [facility] 2022    Shortness of breath 10/22/2020    Tobacco abuse 10/22/2020    Postmenopausal bleeding 2020    COPD exacerbation ([address] 2020    Anxiety 2019    Cobalamin deficiency 10/25/2018    Chronic obstructive pulmonary disease ([address] 2016    History of disease 2016    Chronic obstructive lung disease ([facility] 2016    Gastroesophageal reflux disease     Hypertensive disorder 2015    Cortical senile cataract 2014    Macular degeneration, age related, nonexudative 2014    Nuclear senile cataract 2014    Venous tributary (branch) occlusion of retina 2014        Past Medical History:   Diagnosis Date    DEION (acute kidney injury) ([address] 2022    Bowel disease     Cataract     Chronic back pain     Chronic renal disease, stage III [facility]) [[id number]] 2022    COPD (chronic obstructive pulmonary disease) (HCC)     Dementia (HCC)     Dementia with behavioral disturbance, unspecified dementia type ([facility] 3/11/2022    Dizziness and giddiness     GERD (gastroesophageal reflux disease)     Glaucoma     Hypertension     Osteoarthritis     Pancreatitis     Seizures (HCC)     TIA (transient ischemic attack)      Past Surgical History:   Procedure Laterality Date    APPENDECTOMY      CHOLECYSTECTOMY      INTESTINAL BYPASS      jejunoileal        Restrictions  Restrictions/Precautions: Fall Risk GROSS ASSESSMENT AROM/PROM:  AROM: Within functional limits  PROM: Within functional limits    ROM:   LUE AROM (degrees)  LUE AROM : WFL  RUE AROM (degrees)  RUE AROM : WFL    UE STRENGTH:  Strength: Generally decreased, functional    UE COORDINATION:  Coordination: Generally decreased, functional    UE TONE:  Tone: Normal    UE SENSATION:  Sensation: Intact    Hand Dominance:  Hand Dominance  Hand Dominance: Right    ADL Status:  ADL  Feeding: Setup  Grooming: Modified independent   UE Bathing: Modified independent   LE Bathing: Supervision  UE Dressing: Modified independent   LE Dressing: Minimal assistance  LE Dressing Skilled Clinical Factors: assist to don socks d/t positioning at EOB (assist needed for problem solving)  Toileting: Modified independent   Tub Transfers  Tub Transfers: Stand by assistance    Functional Mobility:    Transfers  Sit to stand: Stand by assistance  Stand to sit: Stand by assistance    Patient ambulated to/from bathroom with WW at SBA level. Bed Mobility  Bed mobility  Supine to Sit: Stand by assistance  Sit to Supine: Stand by assistance    Seated and Standing Balance:  Balance  Sitting: Intact  Standing: Intact    Functional Endurance:  Activity Tolerance  Activity Tolerance: Patient Tolerated treatment well    D/C Recommendations:  OT D/C RECOMMENDATIONS  REQUIRES OT FOLLOW-UP: Yes  Type: Home OT; Home with Assist    Equipment Recommendations:  OT Equipment Recommendations  Other: continue to assess- may benefit from shower chair    OT Education:   Patient Education  Education Given To: Patient  Education Provided: Role of Therapy;Plan of Care;Transfer Training  Education Method: Verbal  Barriers to Learning: Cognition  Education Outcome: Verbalized understanding;Demonstrated understanding    OT Follow Up: continue to assess pending progress, [facility] with OT  OT D/C RECOMMENDATIONS  REQUIRES OT FOLLOW-UP: Yes  Type: Home OT, Home with Assist       Assessment/Discharge Disposition:  Assessment: Pt is a 68 y/o female, recently admitted to [id number] [place] [address] d/t DEION. Pt lives alone and stated she has assist from children. Pt previously was IND with ADLs, and functional transfers and had assist with IADLS. Pt presents with the above deficits and may benefit from skilled OT Interventions for icnreased independence and safety upon d/c to home.   Performance deficits / Impairments: Decreased ADL status, Decreased high-level IADLs, Decreased endurance, Decreased functional mobility   Prognosis: Good  Discharge Recommendations: Home with Home health OT, Continue to assess pending progress  Decision Making: Medium Complexity  History: multi comorbidities  Exam: 4 deficits  Assistance / Modification: MIN A    AMPAC ([employer]) Self care Score   How much help for putting on and taking off regular lower body clothing?: A Little  How much help for Bathing?: A Little  How much help for Toileting?: None  How much help for putting on and taking off regular upper body clothing?: None  How much help for taking care of personal grooming?: None  How much help for eating meals?: None  AM-[facility] Inpatient Daily Activity Raw Score: 22  AM-PAC Inpatient ADL T-Scale Score : 47.1  ADL Inpatient CMS 0-100% Score: 25.8    Therapy key for assistance levels -   Independent/Mod I = Pt. is able to perform task with no assistance but may require a device   Stand by assistance = Pt. does not perform task at an independent level but does not need physical assistance, requires verbal cues  Minimal, Moderate, Maximal Assistance = Pt. requires physical assistance (25%, 50%, 75% assist from helper) for task but is able to actively participate in task   Dependent = Pt. requires total assistance with task and is not able to actively participate with task completion     Plan:  Plan  Times per Week: 1-4x/week  Times per Day: Daily  Plan Weeks: duration of acute stay  Current Treatment Recommendations: Strengthening, Functional mobility training, Safety education & training, Patient/Caregiver education & training, Equipment evaluation, education, & procurement, Self-Care / ADL, Home management training    Goals:   Patient will:    - Improve functional endurance to tolerate/complete 30 mins of ADL's  - Be MOD I  in UB ADLs   - Be MOD I  in LB ADLs  - Be MOD I  in ADL transfers without LOB  - Be MOD I  in toileting tasks  - Improve B UE strength and endurance to [facility] in order to participate in self-care activities as projected.     Patient Goal: Patient goals : to get home     Discussed and agreed upon: Yes Comments:       Therapy Time:   Individual   Time In 0927   Time Out 0944   Minutes 17          Eval: 17 minutes     Electronically signed by:    BLADIMIR Pacheco/L,   8/14/2022, 9:59 AM

## 2022-08-14 NOTE — FLOWSHEET NOTE
Patient very confused, refused mag tablet this evening, after falling asleep she states she does not remember where she is, asks if she just got here. Patient easy to redirect but quick to be forgetful.

## 2022-08-14 NOTE — PROGRESS NOTES
Department of Internal Medicine  General Internal Medicine  Attending Progress Note      SUBJECTIVE:  Pt seen and examined. Mg improved. K low today. Renal function improved.  Paresthesias resolved    OBJECTIVE      Medications    Current Facility-Administered Medications: sodium chloride flush 0.9 % injection 5-40 mL, 5-40 mL, IntraVENous, 2 times per day  sodium chloride flush 0.9 % injection 5-40 mL, 5-40 mL, IntraVENous, PRN  0.9 % sodium chloride infusion, , IntraVENous, PRN  heparin (porcine) injection 5,000 Units, 5,000 Units, SubCUTAneous, BID  ondansetron (ZOFRAN-ODT) disintegrating tablet 4 mg, 4 mg, Oral, Q8H PRN **OR** ondansetron (ZOFRAN) injection 4 mg, 4 mg, IntraVENous, Q6H PRN  polyethylene glycol (GLYCOLAX) packet 17 g, 17 g, Oral, Daily PRN  acetaminophen (TYLENOL) tablet 650 mg, 650 mg, Oral, Q6H PRN **OR** acetaminophen (TYLENOL) suppository 650 mg, 650 mg, Rectal, Q6H PRN  0.9 % sodium chloride infusion, , IntraVENous, Continuous  potassium chloride 10 mEq/100 mL IVPB (Peripheral Line), 10 mEq, IntraVENous, PRN  magnesium sulfate 2000 mg in 50 mL IVPB premix, 2,000 mg, IntraVENous, PRN  magnesium oxide (MAG-OX) tablet 400 mg, 400 mg, Oral, TID  albuterol (PROVENTIL) nebulizer solution 2.5 mg, 2.5 mg, Nebulization, Q6H PRN  tiotropium (SPIRIVA RESPIMAT) 2.5 MCG/ACT inhaler 2 puff, 2 puff, Inhalation, Daily  clopidogrel (PLAVIX) tablet 75 mg, 75 mg, Oral, Daily  mirtazapine (REMERON SOL-TAB) disintegrating tablet 15 mg, 15 mg, Oral, Nightly  pantoprazole (PROTONIX) tablet 40 mg, 40 mg, Oral, QAM AC  propranolol (INDERAL) tablet 40 mg, 40 mg, Oral, BID  labetalol (NORMODYNE;TRANDATE) injection 10 mg, 10 mg, IntraVENous, Q4H PRN  hydrALAZINE (APRESOLINE) injection 10 mg, 10 mg, IntraVENous, Q6H PRN  Physical    VITALS:  BP (!) 172/81   Pulse 81   Temp 97.5 °F (36.4 °C) (Oral)   Resp 18   Ht 5' 3\" (1.6 m)   Wt 83 lb (37.6 kg)   LMP  (LMP Unknown)   SpO2 100%   BMI 14.70 kg/m² Constitutional: Awake and alert in no acute distress. Lying in bed comfortably  Head: Normocephalic, atraumatic  Eyes: EOMI, PERRLA  ENT: moist mucous membranes  Neck: neck supple, trachea midline  Lungs: Good inspiratory effort, no wheeze, no rhonchi, no rales  Heart: RRR, normal S1 and S2  GI: Soft, non-distended, +BS  MSK: no edema noted  Skin: warm, dry  Neuro: no focal deficits  Psych: appropriate affect   Data    CBC:   Lab Results   Component Value Date/Time    WBC 6.8 08/14/2022 04:38 AM    RBC 3.54 08/14/2022 04:38 AM    HGB 11.8 08/14/2022 04:38 AM    HCT 36.1 08/14/2022 04:38 AM    .9 08/14/2022 04:38 AM    MCH 33.5 08/14/2022 04:38 AM    MCHC 32.9 08/14/2022 04:38 AM    RDW 13.3 08/14/2022 04:38 AM     08/14/2022 04:38 AM    MPV 8.4 11/11/2014 03:14 PM     CMP:    Lab Results   Component Value Date/Time     08/14/2022 04:38 AM    K 3.3 08/14/2022 04:38 AM     08/14/2022 04:38 AM    CO2 11 08/14/2022 04:38 AM    BUN 35 08/14/2022 04:38 AM    CREATININE 1.86 08/14/2022 04:38 AM    GFRAA 31.8 08/14/2022 04:38 AM    LABGLOM 26.3 08/14/2022 04:38 AM    GLUCOSE 125 08/14/2022 04:38 AM    PROT 6.8 08/13/2022 12:30 PM    LABALBU 3.7 08/13/2022 12:30 PM    CALCIUM 6.1 08/14/2022 04:38 AM    BILITOT 0.3 08/13/2022 12:30 PM    ALKPHOS 72 08/13/2022 12:30 PM    AST 17 08/13/2022 12:30 PM    ALT 9 08/13/2022 12:30 PM       ASSESSMENT AND PLAN      # DEION with hypomagnesemia and associated bilateral UE paresthesias- improved  - due to poor po intake, dehydration, possible medication noncompliance in the setting of dementia  - supposed to be taking Mg supplements TID - family unsure if she is taking  - Mg 0.5 in the ED. 2.5 today. Recheck and replace as needed  - paresthesias improved with Mg replacement  - Cr  2.37 (baseline 1.5) -> 1.86 today  - cont IVF and monitor. Replace electrolytes as needed  - holding nephrotoxic meds     # Dementia  - monitor  - PT/OT     # HTN  - cont home meds. Holding ARB due to DEION     # COPD  - cont home regimen     DVT: heparin ppx     Disposition: Admitted with DEION and low Mg. Replacing as needed. IVF. Home at discharge as family very involved with her care. Possible discharge tomorrow if electrolytes and renal function improved.       [facility] DO  Internal Medicine   Hospitalist    >35 minutes in total care time

## 2022-08-15 PROBLEM — E43 SEVERE MALNUTRITION (HCC): Status: ACTIVE | Noted: 2022-01-01

## 2022-08-15 LAB
ANION GAP SERPL CALCULATED.3IONS-SCNC: 14 MEQ/L (ref 9–15)
BASOPHILS ABSOLUTE: 0 K/UL (ref 0–0.2)
BASOPHILS RELATIVE PERCENT: 0.2 %
BUN BLDV-MCNC: 27 MG/DL (ref 8–23)
CALCIUM SERPL-MCNC: 6.5 MG/DL (ref 8.5–9.9)
CHLORIDE BLD-SCNC: 121 MEQ/L (ref 95–107)
CO2: 13 MEQ/L (ref 20–31)
CREAT SERPL-MCNC: 1.4 MG/DL (ref 0.5–0.9)
EOSINOPHILS ABSOLUTE: 0.1 K/UL (ref 0–0.7)
EOSINOPHILS RELATIVE PERCENT: 0.8 %
GFR AFRICAN AMERICAN: 44.2
GFR NON-AFRICAN AMERICAN: 36.5
GLUCOSE BLD-MCNC: 98 MG/DL (ref 70–99)
HCT VFR BLD CALC: 32.9 % (ref 37–47)
HEMOGLOBIN: 11 G/DL (ref 12–16)
LYMPHOCYTES ABSOLUTE: 1 K/UL (ref 1–4.8)
LYMPHOCYTES RELATIVE PERCENT: 13.3 %
MAGNESIUM: 2.1 MG/DL (ref 1.7–2.4)
MCH RBC QN AUTO: 34 PG (ref 27–31.3)
MCHC RBC AUTO-ENTMCNC: 33.4 % (ref 33–37)
MCV RBC AUTO: 101.6 FL (ref 82–100)
MONOCYTES ABSOLUTE: 0.6 K/UL (ref 0.2–0.8)
MONOCYTES RELATIVE PERCENT: 8.1 %
NEUTROPHILS ABSOLUTE: 5.8 K/UL (ref 1.4–6.5)
NEUTROPHILS RELATIVE PERCENT: 77.6 %
PDW BLD-RTO: 13.6 % (ref 11.5–14.5)
PLATELET # BLD: 179 K/UL (ref 130–400)
POTASSIUM REFLEX MAGNESIUM: 3.3 MEQ/L (ref 3.4–4.9)
RBC # BLD: 3.23 M/UL (ref 4.2–5.4)
SODIUM BLD-SCNC: 148 MEQ/L (ref 135–144)
WBC # BLD: 7.5 K/UL (ref 4.8–10.8)

## 2022-08-15 PROCEDURE — 6370000000 HC RX 637 (ALT 250 FOR IP): Performed by: INTERNAL MEDICINE

## 2022-08-15 PROCEDURE — 6360000002 HC RX W HCPCS: Performed by: INTERNAL MEDICINE

## 2022-08-15 PROCEDURE — 85025 COMPLETE CBC W/AUTO DIFF WBC: CPT

## 2022-08-15 PROCEDURE — 2580000003 HC RX 258: Performed by: INTERNAL MEDICINE

## 2022-08-15 PROCEDURE — G0378 HOSPITAL OBSERVATION PER HR: HCPCS

## 2022-08-15 PROCEDURE — 36415 COLL VENOUS BLD VENIPUNCTURE: CPT

## 2022-08-15 PROCEDURE — 97535 SELF CARE MNGMENT TRAINING: CPT

## 2022-08-15 PROCEDURE — 94761 N-INVAS EAR/PLS OXIMETRY MLT: CPT

## 2022-08-15 PROCEDURE — 83735 ASSAY OF MAGNESIUM: CPT

## 2022-08-15 PROCEDURE — 96372 THER/PROPH/DIAG INJ SC/IM: CPT

## 2022-08-15 PROCEDURE — 92523 SPEECH SOUND LANG COMPREHEN: CPT

## 2022-08-15 PROCEDURE — 80048 BASIC METABOLIC PNL TOTAL CA: CPT

## 2022-08-15 PROCEDURE — 94640 AIRWAY INHALATION TREATMENT: CPT

## 2022-08-15 PROCEDURE — 1210000000 HC MED SURG R&B

## 2022-08-15 PROCEDURE — 97116 GAIT TRAINING THERAPY: CPT

## 2022-08-15 RX ORDER — CLONAZEPAM 0.5 MG/1
0.5 TABLET ORAL NIGHTLY PRN
Status: DISCONTINUED | OUTPATIENT
Start: 2022-08-15 | End: 2022-08-16 | Stop reason: HOSPADM

## 2022-08-15 RX ORDER — LANOLIN ALCOHOL/MO/W.PET/CERES
400 CREAM (GRAM) TOPICAL 3 TIMES DAILY
Status: DISCONTINUED | OUTPATIENT
Start: 2022-08-15 | End: 2022-08-15 | Stop reason: CLARIF

## 2022-08-15 RX ORDER — POTASSIUM CHLORIDE 20 MEQ/1
40 TABLET, EXTENDED RELEASE ORAL
Status: COMPLETED | OUTPATIENT
Start: 2022-08-15 | End: 2022-08-15

## 2022-08-15 RX ORDER — SODIUM CHLORIDE 450 MG/100ML
INJECTION, SOLUTION INTRAVENOUS CONTINUOUS
Status: DISCONTINUED | OUTPATIENT
Start: 2022-08-15 | End: 2022-08-16 | Stop reason: HOSPADM

## 2022-08-15 RX ADMIN — PROPRANOLOL HYDROCHLORIDE 40 MG: 20 TABLET ORAL at 22:48

## 2022-08-15 RX ADMIN — SODIUM CHLORIDE: 4.5 INJECTION, SOLUTION INTRAVENOUS at 15:25

## 2022-08-15 RX ADMIN — POTASSIUM CHLORIDE 40 MEQ: 1500 TABLET, EXTENDED RELEASE ORAL at 16:45

## 2022-08-15 RX ADMIN — HEPARIN SODIUM 5000 UNITS: 5000 INJECTION INTRAVENOUS; SUBCUTANEOUS at 22:48

## 2022-08-15 RX ADMIN — Medication 400 MG: at 22:58

## 2022-08-15 RX ADMIN — Medication 400 MG: at 15:19

## 2022-08-15 RX ADMIN — HEPARIN SODIUM 5000 UNITS: 5000 INJECTION INTRAVENOUS; SUBCUTANEOUS at 12:34

## 2022-08-15 RX ADMIN — CLOPIDOGREL BISULFATE 75 MG: 75 TABLET ORAL at 12:34

## 2022-08-15 RX ADMIN — POTASSIUM CHLORIDE 40 MEQ: 1500 TABLET, EXTENDED RELEASE ORAL at 18:42

## 2022-08-15 RX ADMIN — CLONAZEPAM 0.5 MG: 0.5 TABLET ORAL at 05:57

## 2022-08-15 RX ADMIN — PROPRANOLOL HYDROCHLORIDE 40 MG: 20 TABLET ORAL at 15:19

## 2022-08-15 RX ADMIN — SODIUM CHLORIDE: 9 INJECTION, SOLUTION INTRAVENOUS at 06:39

## 2022-08-15 RX ADMIN — PANTOPRAZOLE SODIUM 40 MG: 40 TABLET, DELAYED RELEASE ORAL at 05:57

## 2022-08-15 RX ADMIN — TIOTROPIUM BROMIDE INHALATION SPRAY 2 PUFF: 3.12 SPRAY, METERED RESPIRATORY (INHALATION) at 07:08

## 2022-08-15 RX ADMIN — Medication 400 MG: at 12:34

## 2022-08-15 RX ADMIN — MIRTAZAPINE 15 MG: 15 TABLET, ORALLY DISINTEGRATING ORAL at 22:50

## 2022-08-15 ASSESSMENT — PAIN SCALES - GENERAL
PAINLEVEL_OUTOF10: 0

## 2022-08-15 NOTE — CARE COORDINATION
This LSW met with patient and adult children at bedside this am.  D/C plans discussed- Patient is in agreement to move in with daughter upon discharge. Daughter is able to assist patient with care needs. Daughter and patient denies Roger Lovett at this time. LSW / ARELY to follow.   Electronically signed by GABRIELLA Castellano, AMNA on 8/15/22 at 10:53 AM EDT

## 2022-08-15 NOTE — PROGRESS NOTES
[facility]   Facility/Department: [facility]  Speech Language Pathology  Initial Speech/Language/Cognitive Assessment    NAME:Liza Bah  : 1945 (69 y.o.)   [x]   confirmed    MRN: [id number]  ROOM: [place] [address]  ADMISSION DATE: 2022  PATIENT DIAGNOSIS(ES): Hypokalemia [E87.6]  Hypomagnesemia [E83.42]  DEION (acute kidney injury) ([address] [N17.9]  Acute kidney injury [facility]) [N17.9]  Chief Complaint   Patient presents with    Numbness     Pt c/o bilat hand tingling and feeling \"shaky' since last night     Patient Active Problem List    Diagnosis Date Noted    DEION (acute kidney injury) ([address] 2022    Chronic renal disease, stage III ([address] [[id number]] 2022    Dementia with behavioral disturbance, unspecified dementia type ([address] 2022    Shortness of breath 10/22/2020    Tobacco abuse 10/22/2020    Postmenopausal bleeding 2020    COPD exacerbation ([address] 2020    Anxiety 2019    Cobalamin deficiency 10/25/2018    Chronic obstructive pulmonary disease ([address] 2016    History of disease 2016    Chronic obstructive lung disease ([address] 2016    Gastroesophageal reflux disease     Hypertensive disorder 2015    Cortical senile cataract 2014    Macular degeneration, age related, nonexudative 2014    Nuclear senile cataract 2014    Venous tributary (branch) occlusion of retina 2014     Past Medical History:   Diagnosis Date    DEION (acute kidney injury) ([address] 2022    Bowel disease     Cataract     Chronic back pain     Chronic renal disease, stage III [facility]) [[id number]] 2022    COPD (chronic obstructive pulmonary disease) (HCC)     Dementia (HCC)     Dementia with behavioral disturbance, unspecified dementia type ([address] 3/11/2022    Dizziness and giddiness     GERD (gastroesophageal reflux disease)     Glaucoma     Hypertension     Osteoarthritis     Pancreatitis     Seizures ([facility])     TIA (transient ischemic attack)      Past Surgical History:   Procedure Laterality Date    APPENDECTOMY      CHOLECYSTECTOMY      INTESTINAL BYPASS      jejunoileal       DATE ONSET: 8/13/2022    Date of Evaluation: 8/15/2022   Evaluating Therapist: SAUL De Jesus        Diagnosis: Patient presents with severe cognitive linguistic impairment characterized by deficits in recent and remote recall, convergent and divergent naming, following directions, topic maintenance, and coherance in conversation. Patient did not recall meeting SLP yesterday, eating breakfast this morning, or 3 words given for delayed recall task. Patient is pleasant and cooperative, answers questions, but frequently changes the topic and lacks coherance. Requires SLP Intervention: Yes            General  Chart reviewed: Yes  Subjective/Behavior: Pt admitted 8/13 complaining of bilateral finger and toe tingling. Hx of dementia and COPD. Oxygen: Room air  Previous level of function and limitations: Patient was living alone prior to admission. Patient is moving in with daughter upon discharge. Vision and Hearing  Vision  Vision Exceptions: Cataracts  Hearing  Hearing: Exceptions to [facility]  Hearing Exceptions: Hard of hearing/hearing concerns     Oral/Motor  Oral Motor   Labial: No impairment  Dentition: Upper & lower dentures;Edentulous  Oral Hygiene: Moist  Lingual: No impairment    Motor Speech  Motor Speech  Apraxic Characteristics: None  Dysarthric Characteristics: None  Intelligibility: Impaired  Word Intelligibility (%): 100 %  Conversation Intelligibility (%): 80 % (Decreased intelligiblity in conversation due to edentulous status and tangential information.)  Overall Impairment Severity: None    Comprehension  Auditory Comprehension  Comprehension: Exceptions  Basic Questions: WFL  One Step Commands: Moderate (50%)  Two Step Commands:  Moderate (50%)  Conversation: Moderate    Expression  Expression  Primary Mode of Expression: Verbal  Verbal Expression  Verbal Expression: Exceptions to functional limits  Initiation: WFL  Confrontation: WFL  Convergent: Moderate  Divergent: Moderate  Responsive: WFL  Conversation: Moderate  Interfering Components: Impaired thought organization; Attention (Memory)  Written Expression  Written Expression: Unable to assess    Cognition  Orientation  Overall Orientation Status: Impaired  Orientation Level: Oriented to person;Disoriented to place; Disoriented to time;Disoriented to situation  Attention  Attention: Unable to assess  Memory  Memory: Exceptions to [facility]  Long-term Memory: Severe  People Encountered: Severe  Prospective Memory: Severe  Short-term Memory: Severe  Working Memory: Severe    Recommendations  Requires SLP Intervention: Yes  Patient Education: Results of SLE. Patient Education Response: Needs reinforcement; No evidence of learning  Duration of Treatment: 1 week or until goals are met  Frequency of Treatment: 1-2x/week for LOS or until goals are met    Prognosis  Speech Therapy Prognosis  Prognosis: Fair  Prognosis Considerations: Previous Level of Function;Severity of Impairments    Education  Individuals consulted  Consulted and agree with results and recommendations: RN  RN Name: Raya Luke    Treatment/Goals  Short-term Goals  Timeframe for Short-term Goals: 1 week  Goal 1: To increase safety awareness and judgment for safe completion of ADLs secondary to pt's cognitive deficits, pt will provide reasonable solutions to problems of everyday living with 80% accuracy and mod cues. Goal 2: To address pt's cognitive deficits and promote orientation, pt will state name of facility, time within 1 hour, reason in hospital, current month and year with 100% accuracy with use of external aid. Long-term Goals  Timeframe for Long-term Goals: 1 week  Goal 1: Patient will demonstrate functional cognitive-linguistic abilities in all opportunities with supervised assistance in order to safely complete ADLs. Patient's goals:  To go home    Paulton in place: Yes  Type of devices: All fall risk precautions in place; Bed alarm in place;Call light within reach; Telesitter in use  Restraints Initially in Place: No    Pain Assessment  Patient does not appear in pain. Pain Re-assessment  Patient does not appear in pain.          Therapy Time   Time In: 11:10  Time Out: 11:30  Total Time: 20                 Signature: Electronically signed by SAUL Kumar on 8/15/2022 at 12:01 PM

## 2022-08-15 NOTE — PROGRESS NOTES
Pt more confused and impulsive. , multiple attempts to redirect pt. Pt did not sleep well last night. Perfect serve Dr. Mariajose Loco hospitalist who ordered Klonopin 0.5 mg po to be given. Pt medicated per order. Will continue to monitor. Fall precautions in place.

## 2022-08-15 NOTE — PLAN OF CARE
Nutrition Problem #1: Severe malnutrition, In context of chronic illness, In context of social or environmental circumstances  Intervention: Food and/or Nutrient Delivery: Continue Current Diet, Start Oral Nutrition Supplement  Nutritional    Po > 50% meals and supplements, wt > 83#

## 2022-08-15 NOTE — PROGRESS NOTES
Physical Therapy Med Surg Daily Treatment Note  [facility]  [facility]       NAME: Brock Ojeda  : [birth year] (82 y.o.)  MRN: [id number]  CODE STATUS: Full Code    Date of Service: 8/15/2022    Patient Diagnosis(es): Hypokalemia [E87.6]  Hypomagnesemia [E83.42]  DEION (acute kidney injury) ([address] [N17.9]  Acute kidney injury [facility]) [N17.9]   Chief Complaint   Patient presents with    Numbness     Pt c/o bilat hand tingling and feeling \"shaky' since last night     Patient Active Problem List    Diagnosis Date Noted    Severe malnutrition ([address] 08/15/2022    DEION (acute kidney injury) ([address] 2022    Chronic renal disease, stage III ([address] [204378] 2022    Dementia with behavioral disturbance, unspecified dementia type ([address] 2022    Shortness of breath 10/22/2020    Tobacco abuse 10/22/2020    Postmenopausal bleeding 2020    COPD exacerbation ([address] 2020    Anxiety 2019    Cobalamin deficiency 10/25/2018    Chronic obstructive pulmonary disease ([address] 2016    History of disease 2016    Chronic obstructive lung disease ([address] 2016    Gastroesophageal reflux disease     Hypertensive disorder 2015    Cortical senile cataract 2014    Macular degeneration, age related, nonexudative 2014    Nuclear senile cataract 2014    Venous tributary (branch) occlusion of retina 2014        Past Medical History:   Diagnosis Date    DEION (acute kidney injury) ([address] 2022    Bowel disease     Cataract     Chronic back pain     Chronic renal disease, stage III [facility]) [999667] 2022    COPD (chronic obstructive pulmonary disease) ([address]     Dementia ([address]     Dementia with behavioral disturbance, unspecified dementia type ([address] 3/11/2022    Dizziness and giddiness     GERD (gastroesophageal reflux disease)     Glaucoma     Hypertension     Osteoarthritis     Pancreatitis     Seizures (HCC)     TIA (transient ischemic attack)      Past Surgical History:   Procedure Laterality Date    APPENDECTOMY      CHOLECYSTECTOMY      INTESTINAL BYPASS      jejunoileal       Chart Reviewed: Yes    Restrictions:  Restrictions/Precautions: Fall Risk    SUBJECTIVE:   Subjective: Patient reports she will be moving in with her Daughter once D/C'd home. Pain  Pain: Rt LE unable to rate - declined intervention    OBJECTIVE:        Bed mobility  Supine to Sit: Stand by assistance  Sit to Supine: Stand by assistance    Transfers  Sit to Stand: Contact guard assistance;Stand by assistance  Stand to sit: Contact guard assistance;Stand by assistance  Comment: Decreased safety awareness while completing transfers    Ambulation  Surface: level tile  Device: Rolling Walker  Assistance: Stand by assistance;Contact guard assistance  Quality of Gait: decreased safety awareness at times, flexed posture with NBOS  Distance: 20ftx2          ASSESSMENT   Body Structures, Functions, Activity Limitations Requiring Skilled Therapeutic Intervention: Decreased functional mobility ; Decreased strength;Decreased safe awareness;Decreased balance  Assessment: Patient with decreased safety awareness such as while completing transfers. VCs to maintain [address] on [address] at all times as henrietta has tendencies to leave [address]. Daughter present for session. Discharge Recommendations:  Continue to assess pending progress, Patient would benefit from continued therapy after discharge         Goals  Long Term Goals  Long term goal 1: Pt will be independent and safe with all bed mobility and transfers. Long term goal 2: Pt will ambulate with LRD >/= 150' to allow her to safely return home. Long term goal 3: Pt will demonstrate good static and dynamic standing balance with standing withut UE suppoty >/= 2' with good safety to reduce her risk for falls at home. Long term goal 4: Improve josé luis LE strength to achieve all goals and increase ease with mobility.     PLAN    Plan: 1 time a day 3-6 times a week  Safety Devices  Type of Devices: All fall risk precautions in place, Bed alarm in place, Call light within reach     [facility] (6 CLICK) [address] Raw Score : 21     Therapy Time   Individual   Time In 1400   Time Out 1425   Minutes 25      Gait: 15  Trans: 10       Geovany Quintanilla PTA, 08/15/22 at 2:34 PM     Electronically signed by Geovany Quintanilla PTA on 8/15/2022 at 2:34 PM      Definitions for assistance levels  Independent = pt does not require any physical supervision or assistance from another person for activity completion. Device may be needed.   Stand by assistance = pt requires verbal cues or instructions from another person, close to but not touching, to perform the activity  Minimal assistance= pt performs 75% or more of the activity; assistance is required to complete the activity  Moderate assistance= pt performs 50% of the activity; assistance is required to complete the activity  Maximal assistance = pt performs 25% of the activity; assistance is required to complete the activity  Dependent = pt requires total physical assistance to accomplish the task

## 2022-08-15 NOTE — PROGRESS NOTES
Patient is resting in bed. Has been impulsive when it comes to getting out of bed. Avayst in room to help redirect patient. Explained to patient the video monitoring in the room. No complaints of pain of discomfort. No further needs. Call light in reach.

## 2022-08-16 VITALS
HEART RATE: 77 BPM | DIASTOLIC BLOOD PRESSURE: 77 MMHG | HEIGHT: 63 IN | OXYGEN SATURATION: 100 % | RESPIRATION RATE: 16 BRPM | TEMPERATURE: 98.1 F | SYSTOLIC BLOOD PRESSURE: 161 MMHG | WEIGHT: 90.83 LBS | BODY MASS INDEX: 16.09 KG/M2

## 2022-08-16 LAB
ANION GAP SERPL CALCULATED.3IONS-SCNC: 8 MEQ/L (ref 9–15)
BASOPHILS ABSOLUTE: 0 K/UL (ref 0–0.2)
BASOPHILS RELATIVE PERCENT: 0.5 %
BUN BLDV-MCNC: 32 MG/DL (ref 8–23)
CALCIUM SERPL-MCNC: 7.7 MG/DL (ref 8.5–9.9)
CHLORIDE BLD-SCNC: 118 MEQ/L (ref 95–107)
CO2: 16 MEQ/L (ref 20–31)
CREAT SERPL-MCNC: 1.45 MG/DL (ref 0.5–0.9)
EOSINOPHILS ABSOLUTE: 0.1 K/UL (ref 0–0.7)
EOSINOPHILS RELATIVE PERCENT: 1.8 %
GFR AFRICAN AMERICAN: 42.5
GFR NON-AFRICAN AMERICAN: 35.1
GLUCOSE BLD-MCNC: 103 MG/DL (ref 70–99)
GLUCOSE BLD-MCNC: 84 MG/DL (ref 70–99)
GLUCOSE BLD-MCNC: 85 MG/DL (ref 70–99)
GLUCOSE BLD-MCNC: 90 MG/DL (ref 70–99)
HCT VFR BLD CALC: 30.6 % (ref 37–47)
HEMOGLOBIN: 10.4 G/DL (ref 12–16)
LYMPHOCYTES ABSOLUTE: 1.2 K/UL (ref 1–4.8)
LYMPHOCYTES RELATIVE PERCENT: 15 %
MCH RBC QN AUTO: 34.5 PG (ref 27–31.3)
MCHC RBC AUTO-ENTMCNC: 34.1 % (ref 33–37)
MCV RBC AUTO: 101.2 FL (ref 82–100)
MONOCYTES ABSOLUTE: 0.7 K/UL (ref 0.2–0.8)
MONOCYTES RELATIVE PERCENT: 8.2 %
NEUTROPHILS ABSOLUTE: 5.9 K/UL (ref 1.4–6.5)
NEUTROPHILS RELATIVE PERCENT: 74.5 %
PDW BLD-RTO: 13.4 % (ref 11.5–14.5)
PERFORMED ON: ABNORMAL
PERFORMED ON: NORMAL
PERFORMED ON: NORMAL
PLATELET # BLD: 162 K/UL (ref 130–400)
POTASSIUM REFLEX MAGNESIUM: 5.1 MEQ/L (ref 3.4–4.9)
RBC # BLD: 3.03 M/UL (ref 4.2–5.4)
SODIUM BLD-SCNC: 142 MEQ/L (ref 135–144)
WBC # BLD: 7.9 K/UL (ref 4.8–10.8)

## 2022-08-16 PROCEDURE — G0378 HOSPITAL OBSERVATION PER HR: HCPCS

## 2022-08-16 PROCEDURE — 85025 COMPLETE CBC W/AUTO DIFF WBC: CPT

## 2022-08-16 PROCEDURE — 6360000002 HC RX W HCPCS: Performed by: INTERNAL MEDICINE

## 2022-08-16 PROCEDURE — 96372 THER/PROPH/DIAG INJ SC/IM: CPT

## 2022-08-16 PROCEDURE — 6370000000 HC RX 637 (ALT 250 FOR IP): Performed by: INTERNAL MEDICINE

## 2022-08-16 PROCEDURE — 2580000003 HC RX 258: Performed by: INTERNAL MEDICINE

## 2022-08-16 PROCEDURE — 36415 COLL VENOUS BLD VENIPUNCTURE: CPT

## 2022-08-16 PROCEDURE — 80048 BASIC METABOLIC PNL TOTAL CA: CPT

## 2022-08-16 PROCEDURE — 97535 SELF CARE MNGMENT TRAINING: CPT

## 2022-08-16 PROCEDURE — 97116 GAIT TRAINING THERAPY: CPT

## 2022-08-16 RX ADMIN — SODIUM CHLORIDE, PRESERVATIVE FREE 10 ML: 5 INJECTION INTRAVENOUS at 08:31

## 2022-08-16 RX ADMIN — SODIUM CHLORIDE: 4.5 INJECTION, SOLUTION INTRAVENOUS at 03:09

## 2022-08-16 RX ADMIN — Medication 400 MG: at 14:46

## 2022-08-16 RX ADMIN — Medication 400 MG: at 08:28

## 2022-08-16 RX ADMIN — TIOTROPIUM BROMIDE INHALATION SPRAY 2 PUFF: 3.12 SPRAY, METERED RESPIRATORY (INHALATION) at 08:26

## 2022-08-16 RX ADMIN — PANTOPRAZOLE SODIUM 40 MG: 40 TABLET, DELAYED RELEASE ORAL at 05:35

## 2022-08-16 RX ADMIN — CLOPIDOGREL BISULFATE 75 MG: 75 TABLET ORAL at 08:28

## 2022-08-16 RX ADMIN — HEPARIN SODIUM 5000 UNITS: 5000 INJECTION INTRAVENOUS; SUBCUTANEOUS at 08:30

## 2022-08-16 RX ADMIN — PROPRANOLOL HYDROCHLORIDE 40 MG: 20 TABLET ORAL at 08:28

## 2022-08-16 RX ADMIN — SODIUM CHLORIDE, PRESERVATIVE FREE 10 ML: 5 INJECTION INTRAVENOUS at 08:30

## 2022-08-16 ASSESSMENT — PAIN SCALES - GENERAL: PAINLEVEL_OUTOF10: 0

## 2022-08-16 NOTE — DISCHARGE SUMMARY
Hospital Medicine Discharge Summary    Brock Ojeda  :    MRN:  [id number]    Admit date:  2022  Discharge date:  2022    Admitting Physician:  Sage Olmos DO  Primary Care Physician:  Lucero Johsi MD    Brock Ojeda is a 69 y.o. female that was admitted and treated for the following medical issues:     Principal Problem:    DEION (acute kidney injury) [facility])  Active Problems:    Severe malnutrition ([address]  Resolved Problems:    * No resolved hospital problems. *      Discharge Diagnoses:    Principal Problem:    DEION (acute kidney injury) ([address]  Active Problems:    Severe malnutrition ([address]  Resolved Problems:    * No resolved hospital problems. *    Chief Complaint   Patient presents with    Numbness     Pt c/o bilat hand tingling and feeling Scarlet Brocks' since last night       Hospital Course:   Brock Ojeda is a 69 y.o. female who was admitted with parasthesia sec to hypomagnesemia and deion. Deion resolved. Ckd. Mag replaced, lytes monitored. Dc'd with outpt follow up. Pt was discharge in a stable condition. BP (!) 161/77   Pulse 77   Temp 98.1 °F (36.7 °C) (Oral)   Resp 16   Ht 5' 3\" (1.6 m)   Wt 90 lb 13.3 oz (41.2 kg)   LMP  (LMP Unknown)   SpO2 100%   BMI 16.09 kg/m²     Patient was seen by the following consultants  Consults:  IP CONSULT TO DIETITIAN    Significant Diagnostic Studies:    Refer to chart if  No results found.     Discharge Medications:         Medication List        CONTINUE taking these medications      albuterol sulfate  (90 Base) MCG/ACT inhaler  Commonly known as: Ventolin HFA  USE 2 INHALATIONS EVERY 6 HOURS AS NEEDED FOR WHEEZING     clonazePAM 0.5 MG tablet  Commonly known as: KLONOPIN  TAKE 1 TABLET BY MOUTH EVERY NIGHT AS NEEDED FOR ANXIETY     clopidogrel 75 MG tablet  Commonly known as: PLAVIX  TAKE 1 TABLET DAILY     clotrimazole-betamethasone 1-0.05 % cream  Commonly known as: LOTRISONE  APPLY TOPICALLY TWICE A DAY     Creon 10122-81517 units delayed release capsule  Generic drug: lipase-protease-amylase  TAKE 1 CAPSULE TWICE A DAY     losartan 50 MG tablet  Commonly known as: COZAAR  TAKE 1 TABLET DAILY     Magnesium Oxide 400 MG Caps  Take 400 mg by mouth 3 times daily     metroNIDAZOLE 500 MG tablet  Commonly known as: FLAGYL  TAKE 1 TABLET THREE TIMES A DAY FOR 10 DAYS PER MONTH     mirtazapine 15 MG tablet  Commonly known as: REMERON  TAKE 1 TABLET BY MOUTH EVERY NIGHT     pantoprazole 40 MG tablet  Commonly known as: PROTONIX  TAKE 1 TABLET DAILY     propranolol 40 MG tablet  Commonly known as: INDERAL  TAKE 1 TABLET TWICE A DAY (REPLACES PREVIOUS PRESCRIPTION)     * therapeutic multivitamin-minerals tablet  Take 1 tablet by mouth daily     * Multivitamin Women 50+ Tabs  TAKE 1 TABLET BY MOUTH EVERY DAY     tiotropium-olodaterol 2.5-2.5 MCG/ACT Aers  Commonly known as: STIOLTO  Inhale 2 puffs into the lungs daily           * This list has 2 medication(s) that are the same as other medications prescribed for you. Read the directions carefully, and ask your doctor or other care provider to review them with you. Disposition:   If discharged to Home, Any [place] [address] needs that were indicated and/or required as been addressed and set up by Social Work. Condition at discharge: Pt was medically stable at the time of discharge. Activity: activity as tolerated    Total time taken for discharging this patient: 40 minutes. Greater than 70% of time was spent focused exclusively on this patient. Time was taken to review chart, discuss plans with consultants, reconciling medications, discussing plan answering questions with patient.      Signed:  George Ralph MD

## 2022-08-16 NOTE — PROGRESS NOTES
Shift assessment complete. VSS, pt has no complaints, call light in reach, will continue to monitor.

## 2022-08-16 NOTE — PROGRESS NOTES
Physical Therapy  Physical Therapy Med Surg Daily Treatment Note  Facility/Department: [facility]  [facility]       NAME: Rima Sal  : [birth year] (25 y.o.)  MRN: [id number]  CODE STATUS: Full Code    Date of Service: 2022    Patient Diagnosis(es): Hypokalemia [E87.6]  Hypomagnesemia [E83.42]  DEION (acute kidney injury) ([address] [N17.9]  Acute kidney injury [facility]) [N17.9]   Chief Complaint   Patient presents with    Numbness     Pt c/o bilat hand tingling and feeling \"shaky' since last night     Patient Active Problem List    Diagnosis Date Noted    Severe malnutrition ([address] 08/15/2022    DEION (acute kidney injury) ([address] 2022    Chronic renal disease, stage III ([address] [412407] 2022    Dementia with behavioral disturbance, unspecified dementia type ([address] 2022    Shortness of breath 10/22/2020    Tobacco abuse 10/22/2020    Postmenopausal bleeding 2020    COPD exacerbation ([address] 2020    Anxiety 2019    Cobalamin deficiency 10/25/2018    Chronic obstructive pulmonary disease ([address] 2016    History of disease 2016    Chronic obstructive lung disease ([address] 2016    Gastroesophageal reflux disease     Hypertensive disorder 2015    Cortical senile cataract 2014    Macular degeneration, age related, nonexudative 2014    Nuclear senile cataract 2014    Venous tributary (branch) occlusion of retina 2014        Past Medical History:   Diagnosis Date    DEION (acute kidney injury) ([address] 2022    Bowel disease     Cataract     Chronic back pain     Chronic renal disease, stage III [facility]) [769184] 2022    COPD (chronic obstructive pulmonary disease) ([address]     Dementia ([address]     Dementia with behavioral disturbance, unspecified dementia type ([address] 3/11/2022    Dizziness and giddiness     GERD (gastroesophageal reflux disease)     Glaucoma     Hypertension     Osteoarthritis     Pancreatitis     Seizures ([address]     TIA (transient ischemic attack)      Past Surgical History:   Procedure Laterality Date    APPENDECTOMY      CHOLECYSTECTOMY      INTESTINAL BYPASS      jejunoileal            Restrictions:       SUBJECTIVE:    Pt reports no pain at rest but 5/10 right foot when weightbearing. \" Its bothered me like that for years\"    Pain   5/10 dull right foot pain while standing. OBJECTIVE:   Transfers and bed mobility:   Sit to supine  SBA  Supine to sit Min. Sit to stand SBA  Stand to sit SBA    Ambulation  Surface: level tile  Device: Rolling Walker  Assistance: Stand by assistance  Quality of Gait: Forward flexed. NBOS, decreased safety awareness Much VC to step up into walker. Gait Deviations: Slow Le;Decreased step length  Distance: 50' x 1     ASSESSMENT     Continues to be seen for progression of ambulation. Much verbal cues to step up into walker. Maintained BUE's on walker during ambulation without cueing. Discharge Recommendations:  Continue to assess pending progress, Patient would benefit from continued therapy after discharge         Goals  Long Term Goals  Long term goal 1: Pt will be independent and safe with all bed mobility and transfers. Long term goal 2: Pt will ambulate with LRD >/= 150' to allow her to safely return home. Long term goal 3: Pt will demonstrate good static and dynamic standing balance with standing withut UE suppoty >/= 2' with good safety to reduce her risk for falls at home. Long term goal 4: Improve adryan LE strength to achieve all goals and increase ease with mobility.     PLAN    Plan: 1 time a day 3-6 times a week        [facility] (6 CLICK) [address] Inpatient Mobility Raw Score : 20     Therapy Time   Individual   Time In 1010   Time Out 1033   Minutes 23     Timed Code Treatment Minutes: 23 Minutes   TR [address], 08/16/22 at 10:38 AM         Definitions for assistance levels  Independent = pt does not require any physical supervision or assistance from another person for activity completion. Device may be needed.   Stand by assistance = pt requires verbal cues or instructions from another person, close to but not touching, to perform the activity  Minimal assistance= pt performs 75% or more of the activity; assistance is required to complete the activity  Moderate assistance= pt performs 50% of the activity; assistance is required to complete the activity  Maximal assistance = pt performs 25% of the activity; assistance is required to complete the activity  Dependent = pt requires total physical assistance to accomplish the task

## 2022-08-16 NOTE — CARE COORDINATION
This LSW met with patient at bedside this am. Daughter visiting. D/C plan discussed- patient to move to daughters home upon discharge. Patient and daughter denies needs at this time. SANTOSW/ARELY to follow.   Electronically signed by GABRIELLA Perez, AMNA on 8/16/22 at 11:30 AM EDT

## 2022-08-16 NOTE — PROGRESS NOTES
TROPONINI in the last 72 hours. Radiology:  No orders to display     Assessment/Plan:    Patient feeling much better, anxious to go home. Hypernatremia: Uptrending. Change IV fluids from normal saline to 0.45 saline    Paresthesia resolved with magnesium replacement. Severe hypomagnesemia: Resolved. Hyponatremia: Improving. DEION: Resolving. 35 minutes total care time, >1/2 in unit/floor time and care coordination     DC plan:  Anticipate discharge in 1 to 2 days, monitoring sodium      Evan Weeks MD ,MD

## 2022-08-16 NOTE — DISCHARGE INSTR - COC
Continuity of Care Form    Patient Name: Reynaldo Walters   :  89  MRN:  [id number]    Admit date:  2022  Discharge date:  ***    Code Status Order: Full Code   Advance Directives:     Admitting Physician:  Bjorn Calderon DO  PCP: Tiff Varma MD    Discharging Nurse: [facility] Unit/Room#: Y958/W855-95  Discharging Unit Phone Number: ***    Emergency Contact:   Extended Emergency Contact Information  Primary Emergency Contact: Flash iJmenez  Address: [address] [place] [address] [address]  Phone: [phone]  Mobile Phone: [phone]  Relation: Child  Secondary Emergency Contact: Lara Garcia   [facility] [address] [facility] Phone: [phone]  Relation: Child    Past Surgical History:  Past Surgical History:   Procedure Laterality Date    APPENDECTOMY      CHOLECYSTECTOMY      INTESTINAL BYPASS      jejunoileal       Immunization History:   Immunization History   Administered Date(s) Administered    Influenza, High Dose (Fluzone 65 yrs and older) 2015, 10/19/2016, 2017, 10/17/2018    Influenza, Lajoyce Felecia, adjuvanted, 65 yrs +, IM, PF (Fluad) 10/21/2020, 2021    Influenza, Triv, inactivated, subunit, adjuvanted, IM (Fluad 65 yrs and older) 10/18/2019    Pneumococcal Conjugate 13-valent (Alix Lien) 10/18/2019    Pneumococcal Polysaccharide (Tzlwviled98) 10/21/2020       Active Problems:  Patient Active Problem List   Diagnosis Code    Hypertensive disorder I10    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    Cortical senile cataract H25.019    Macular degeneration, age related, nonexudative H35.3190    Nuclear senile cataract H25.10    Gastroesophageal reflux disease K21.9    Chronic obstructive pulmonary disease ([address] J44.9    History of disease Z87.898    Cobalamin deficiency E53.8    Anxiety F41.9    Chronic obstructive lung disease ([address] J44.9    COPD exacerbation ([address] J44.1    Postmenopausal bleeding N95.0 Shortness of breath R06.02    Tobacco abuse Z72.0    Dementia with behavioral disturbance, unspecified dementia type ([facility] F03.91    Chronic renal disease, stage III ([facility] [484296] N18.30    DEION (acute kidney injury) ([facility] N17.9    Severe malnutrition (HCC) E43       Isolation/Infection:   Isolation            No Isolation          Patient Infection Status       None to display            Nurse Assessment:  Last Vital Signs: BP (!) 161/77   Pulse 77   Temp 98.1 °F (36.7 °C) (Oral)   Resp 16   Ht 5' 3\" (1.6 m)   Wt 90 lb 13.3 oz (41.2 kg)   LMP  (LMP Unknown)   SpO2 100%   BMI 16.09 kg/m²     Last documented pain score (0-10 scale): Pain Level: 0  Last Weight:   Wt Readings from Last 1 Encounters:   08/15/22 90 lb 13.3 oz (41.2 kg)     Mental Status:  {IP PT MENTAL STATUS:20030}    IV Access:  { SWETHA IV ACCESS:123245962}    Nursing Mobility/ADLs:  Walking   {P DME PAPT:379566248}  Transfer  {P DME BGOV:706255301}  Bathing  {P DME XQPH:033707885}  Dressing  {P DME EKJQ:053206938}  Toileting  {P DME HNLF:040106842}  Feeding  {[facility] DME IPZY:737762103}  Med Admin  {[facility] DME FLJR:038502502}  Med Delivery   {[facility] MED Delivery:755722593}    Wound Care Documentation and Therapy:        Elimination:  Continence: Bowel: {YES / MU:24585}  Bladder: {YES / FC:13513}  Urinary Catheter: {Urinary Catheter:040107085}   Colostomy/Ileostomy/Ileal Conduit: {YES / DY:64726}       Date of Last BM: ***    Intake/Output Summary (Last 24 hours) at 8/16/2022 1242  Last data filed at 8/15/2022 2306  Gross per 24 hour   Intake 20 ml   Output 1 ml   Net 19 ml     I/O last 3 completed shifts:   In: 2609 [P.O.:260; I.V.:2349]  Out: 353 [Urine:353]    Safety Concerns:     [address] JCD Safety Concerns:487033291}    Impairments/Disabilities:      [address] JCD Impairments/Disabilities:715375757}    Nutrition Therapy:  Current Nutrition Therapy:   [address] SWETHA Diet List:991363170}    Routes of Feeding: {CHP DME Other Feedings:430882849}  Liquids: {Slp liquid thickness:71740}  Daily Fluid Restriction: {CHP DME Yes amt example:008666649}  Last Modified Barium Swallow with Video (Video Swallowing Test): {Done Not Done NHFL:210793016}    Treatments at the Time of Hospital Discharge:   Respiratory Treatments: ***  Oxygen Therapy:  {Therapy; copd oxygen:38171}  Ventilator:    { CC Vent MQQD:901585941}    Rehab Therapies: {THERAPEUTIC INTERVENTION:4755149845}  Weight Bearing Status/Restrictions: { CC Weight Bearin}  Other Medical Equipment (for information only, NOT a DME order):  {EQUIPMENT:361599588}  Other Treatments: ***    Patient's personal belongings (please select all that are sent with patient):  {[facility] DME Belongings:488359073}    RN SIGNATURE:  {Esignature:910205808}    CASE MANAGEMENT/SOCIAL WORK SECTION    Inpatient Status Date: ***    Readmission Risk Assessment Score:  Readmission Risk              Risk of Unplanned Readmission:  21           Discharging to Facility/ Agency   Name:   Address:  Phone:  Fax:    Dialysis Facility (if applicable)   Name:  Address:  Dialysis Schedule:  Phone:  Fax:    / signature: {Esignature:043848125}    PHYSICIAN SECTION    Prognosis: {Prognosis:6167831394}    Condition at Discharge: [address] [place] [address] Patient Condition:907672156}    Rehab Potential (if transferring to Rehab): {Prognosis:9849967151}    Recommended Labs or Other Treatments After Discharge: ***    Physician Certification: I certify the above information and transfer of Khanh Elder  is necessary for the continuing treatment of the diagnosis listed and that she requires {Admit to Appropriate Level of Care:32390} for {GREATER/LESS:024925200} 30 days.      Update Admission H&P: {CHP DME Changes in MUBLU:427484144}    PHYSICIAN SIGNATURE:  {Esignature:297957783}

## 2022-08-16 NOTE — PROGRESS NOTES
Assessment completed this shift. Alert and oriented x4, but confused at times. Spoke to this nurse at shift change about this nurse going to her house. Impulsive at times about getting out of bed. AVASYS for safety. BMP resulted.  PerfectServ to Dr Neena Negron to inform  Electronically signed by Zunilda Byrd RN on 8/16/2022 at 12:00 PM

## 2022-08-17 ENCOUNTER — CARE COORDINATION (OUTPATIENT)
Dept: CARE COORDINATION | Age: 77
End: 2022-08-17

## 2022-08-17 DIAGNOSIS — N17.9 AKI (ACUTE KIDNEY INJURY) (HCC): Primary | ICD-10-CM

## 2022-08-17 PROCEDURE — 1111F DSCHRG MED/CURRENT MED MERGE: CPT | Performed by: FAMILY MEDICINE

## 2022-08-18 LAB
EKG ATRIAL RATE: 77 BPM
EKG P AXIS: 63 DEGREES
EKG P-R INTERVAL: 108 MS
EKG Q-T INTERVAL: 408 MS
EKG QRS DURATION: 86 MS
EKG QTC CALCULATION (BAZETT): 461 MS
EKG R AXIS: 11 DEGREES
EKG T AXIS: 81 DEGREES
EKG VENTRICULAR RATE: 77 BPM

## 2022-08-25 ENCOUNTER — CARE COORDINATION (OUTPATIENT)
Dept: CARE COORDINATION | Age: 77
End: 2022-08-25

## 2022-08-25 ENCOUNTER — OFFICE VISIT (OUTPATIENT)
Dept: FAMILY MEDICINE CLINIC | Age: 77
End: 2022-08-25
Payer: MEDICARE

## 2022-08-25 VITALS
BODY MASS INDEX: 14.6 KG/M2 | SYSTOLIC BLOOD PRESSURE: 120 MMHG | DIASTOLIC BLOOD PRESSURE: 86 MMHG | WEIGHT: 82.4 LBS | TEMPERATURE: 97.3 F | OXYGEN SATURATION: 96 % | HEIGHT: 63 IN | HEART RATE: 74 BPM

## 2022-08-25 DIAGNOSIS — F03.91 DEMENTIA WITH BEHAVIORAL DISTURBANCE, UNSPECIFIED DEMENTIA TYPE: ICD-10-CM

## 2022-08-25 DIAGNOSIS — Z09 HOSPITAL DISCHARGE FOLLOW-UP: ICD-10-CM

## 2022-08-25 DIAGNOSIS — F41.9 ANXIETY: ICD-10-CM

## 2022-08-25 DIAGNOSIS — E43 SEVERE MALNUTRITION (HCC): ICD-10-CM

## 2022-08-25 DIAGNOSIS — F32.A DEPRESSION, UNSPECIFIED DEPRESSION TYPE: ICD-10-CM

## 2022-08-25 DIAGNOSIS — L97.519 ULCER OF TOE OF RIGHT FOOT, UNSPECIFIED ULCER STAGE (HCC): ICD-10-CM

## 2022-08-25 DIAGNOSIS — N17.9 AKI (ACUTE KIDNEY INJURY) (HCC): Primary | ICD-10-CM

## 2022-08-25 PROCEDURE — 1111F DSCHRG MED/CURRENT MED MERGE: CPT | Performed by: FAMILY MEDICINE

## 2022-08-25 PROCEDURE — 99495 TRANSJ CARE MGMT MOD F2F 14D: CPT | Performed by: FAMILY MEDICINE

## 2022-08-25 RX ORDER — MIRTAZAPINE 15 MG/1
TABLET, FILM COATED ORAL
Qty: 90 TABLET | Refills: 3 | Status: SHIPPED | OUTPATIENT
Start: 2022-08-25

## 2022-08-25 RX ORDER — CLONAZEPAM 0.5 MG/1
TABLET ORAL
Qty: 30 TABLET | Refills: 2 | Status: SHIPPED | OUTPATIENT
Start: 2022-08-25 | End: 2022-11-22

## 2022-08-25 SDOH — ECONOMIC STABILITY: FOOD INSECURITY: WITHIN THE PAST 12 MONTHS, YOU WORRIED THAT YOUR FOOD WOULD RUN OUT BEFORE YOU GOT MONEY TO BUY MORE.: NEVER TRUE

## 2022-08-25 SDOH — ECONOMIC STABILITY: TRANSPORTATION INSECURITY
IN THE PAST 12 MONTHS, HAS THE LACK OF TRANSPORTATION KEPT YOU FROM MEDICAL APPOINTMENTS OR FROM GETTING MEDICATIONS?: NO

## 2022-08-25 SDOH — ECONOMIC STABILITY: TRANSPORTATION INSECURITY
IN THE PAST 12 MONTHS, HAS LACK OF TRANSPORTATION KEPT YOU FROM MEETINGS, WORK, OR FROM GETTING THINGS NEEDED FOR DAILY LIVING?: NO

## 2022-08-25 SDOH — ECONOMIC STABILITY: FOOD INSECURITY: WITHIN THE PAST 12 MONTHS, THE FOOD YOU BOUGHT JUST DIDN'T LAST AND YOU DIDN'T HAVE MONEY TO GET MORE.: NEVER TRUE

## 2022-08-25 ASSESSMENT — PATIENT HEALTH QUESTIONNAIRE - PHQ9
SUM OF ALL RESPONSES TO PHQ QUESTIONS 1-9: 2
6. FEELING BAD ABOUT YOURSELF - OR THAT YOU ARE A FAILURE OR HAVE LET YOURSELF OR YOUR FAMILY DOWN: 0
SUM OF ALL RESPONSES TO PHQ QUESTIONS 1-9: 2
2. FEELING DOWN, DEPRESSED OR HOPELESS: 1
7. TROUBLE CONCENTRATING ON THINGS, SUCH AS READING THE NEWSPAPER OR WATCHING TELEVISION: 0
3. TROUBLE FALLING OR STAYING ASLEEP: 0
9. THOUGHTS THAT YOU WOULD BE BETTER OFF DEAD, OR OF HURTING YOURSELF: 0
SUM OF ALL RESPONSES TO PHQ QUESTIONS 1-9: 2
SUM OF ALL RESPONSES TO PHQ QUESTIONS 1-9: 2
SUM OF ALL RESPONSES TO PHQ9 QUESTIONS 1 & 2: 1
1. LITTLE INTEREST OR PLEASURE IN DOING THINGS: 0
8. MOVING OR SPEAKING SO SLOWLY THAT OTHER PEOPLE COULD HAVE NOTICED. OR THE OPPOSITE, BEING SO FIGETY OR RESTLESS THAT YOU HAVE BEEN MOVING AROUND A LOT MORE THAN USUAL: 0
10. IF YOU CHECKED OFF ANY PROBLEMS, HOW DIFFICULT HAVE THESE PROBLEMS MADE IT FOR YOU TO DO YOUR WORK, TAKE CARE OF THINGS AT HOME, OR GET ALONG WITH OTHER PEOPLE: 0
5. POOR APPETITE OR OVEREATING: 0
4. FEELING TIRED OR HAVING LITTLE ENERGY: 1

## 2022-08-25 ASSESSMENT — SOCIAL DETERMINANTS OF HEALTH (SDOH): HOW HARD IS IT FOR YOU TO PAY FOR THE VERY BASICS LIKE FOOD, HOUSING, MEDICAL CARE, AND HEATING?: NOT HARD AT ALL

## 2022-08-25 NOTE — PROGRESS NOTES
Post-Discharge Transitional Care  Follow Up      Lucy Knight   YOB: 1945    Date of Office Visit:  8/25/2022  Date of Hospital Admission: 8/13/22  Date of Hospital Discharge: 8/16/22  Risk of hospital readmission (high >=14%. Medium >=10%) :Readmission Risk Score: 15.7      Care management risk score Rising risk (score 2-5) and Complex Care (Scores >=6): No Risk Score On File     Non face to face  following discharge, date last encounter closed (first attempt may have been earlier): 08/17/2022    Call initiated 2 business days of discharge: Yes    ASSESSMENT/PLAN:   DEION (acute kidney injury) ([address]  Anxiety  -     clonazePAM (KLONOPIN) 0.5 MG tablet; TAKE 1 TABLET BY MOUTH EVERY NIGHT AS NEEDED FOR ANXIETY, Disp-30 tablet, R-2Normal  Hospital discharge follow-up  -     [place] DISCHARGE MEDS RECONCILED W/ CURRENT OUTPATIENT MED LIST  Severe malnutrition ([address]  Dementia with behavioral disturbance, unspecified dementia type ([address]  Ulcer of toe of right foot, unspecified ulcer stage ([address]  -     AYAAN - Melisa Muller, DPM, Podiatry, [place]      Medical Decision Making: moderate complexity  No follow-ups on file. On this date 8/25/2022 I have spent 20 minutes reviewing previous notes, test results and face to face with the patient discussing the diagnosis and importance of compliance with the treatment plan as well as documenting on the day of the visit. Subjective:   HPI:  Follow up of Hospital problems/diagnosis(es): DEION, delerium, malnutrition    Inpatient course: Discharge summary reviewed- see chart.   Admit date:  8/13/2022                      Discharge date:  8/16/2022     Admitting Physician:  Akanksha Centeno DO  Primary Care Physician:  Ana Lilia Mcnamara MD     Lucy Knight is a 69 y.o. female that was admitted and treated for the following medical issues:      Principal Problem:    DEION (acute kidney injury) [facility])  Active Problems:    Severe malnutrition ([address]  Resolved Problems:    * No resolved hospital problems. *        Discharge Diagnoses:    Principal Problem:    DEION (acute kidney injury) ([address]  Active Problems:    Severe malnutrition ([address]  Resolved Problems:    * No resolved hospital problems. *          Chief Complaint   Patient presents with    Numbness       Pt c/o bilat hand tingling and feeling Saralee Terra' since last night         Hospital Course:   Katie Kwok is a 69 y.o. female who was admitted with parasthesia sec to hypomagnesemia and deion. Deion resolved. Ckd. Mag replaced, lytes monitored. Dc'd with outpt follow up. Pt was discharge in a stable condition. Interval history/Current status:     Moved in with daughter  Eating well     Wt Readings from Last 3 Encounters:   08/25/22 82 lb 6.4 oz (37.4 kg)   08/15/22 90 lb 13.3 oz (41.2 kg)   07/01/22 87 lb (39.5 kg)         Patient Active Problem List   Diagnosis    Hypertensive disorder    Venous tributary (branch) occlusion of retina    Cortical senile cataract    Macular degeneration, age related, nonexudative    Nuclear senile cataract    Gastroesophageal reflux disease    Chronic obstructive pulmonary disease (HCC)    History of disease    Cobalamin deficiency    Anxiety    Chronic obstructive lung disease (HCC)    COPD exacerbation (HCC)    Postmenopausal bleeding    Shortness of breath    Tobacco abuse    Dementia with behavioral disturbance, unspecified dementia type ([address]    Chronic renal disease, stage III ([address] [352236]    DEION (acute kidney injury) ([address]    Severe malnutrition ([address]       Medications listed as ordered at the time of discharge from hospital     Medication List            Accurate as of August 25, 2022  2:08 PM. If you have any questions, ask your nurse or doctor.                 CONTINUE taking these medications      albuterol sulfate  (90 Base) MCG/ACT inhaler  Commonly known as: Ventolin HFA  USE 2 INHALATIONS EVERY 6 HOURS AS NEEDED FOR WHEEZING     clonazePAM 0.5 MG tablet  Commonly known as: KLONOPIN  TAKE 1 TABLET BY MOUTH EVERY NIGHT AS NEEDED FOR ANXIETY     clopidogrel 75 MG tablet  Commonly known as: PLAVIX  TAKE 1 TABLET DAILY     clotrimazole-betamethasone 1-0.05 % cream  Commonly known as: LOTRISONE  APPLY TOPICALLY TWICE A DAY     Creon 80394-82520 units delayed release capsule  Generic drug: lipase-protease-amylase  TAKE 1 CAPSULE TWICE A DAY     losartan 50 MG tablet  Commonly known as: COZAAR  TAKE 1 TABLET DAILY     Magnesium Oxide 400 MG Caps  Take 400 mg by mouth 3 times daily     metroNIDAZOLE 500 MG tablet  Commonly known as: FLAGYL  TAKE 1 TABLET THREE TIMES A DAY FOR 10 DAYS PER MONTH     mirtazapine 15 MG tablet  Commonly known as: REMERON  TAKE 1 TABLET BY MOUTH EVERY NIGHT     pantoprazole 40 MG tablet  Commonly known as: PROTONIX  TAKE 1 TABLET DAILY     propranolol 40 MG tablet  Commonly known as: INDERAL  TAKE 1 TABLET TWICE A DAY (REPLACES PREVIOUS PRESCRIPTION)     * therapeutic multivitamin-minerals tablet  Take 1 tablet by mouth daily     * Multivitamin Women 50+ Tabs  TAKE 1 TABLET BY MOUTH EVERY DAY     tiotropium-olodaterol 2.5-2.5 MCG/ACT Aers  Commonly known as: STIOLTO  Inhale 2 puffs into the lungs daily           * This list has 2 medication(s) that are the same as other medications prescribed for you. Read the directions carefully, and ask your doctor or other care provider to review them with you.                    Where to Get Your Medications        These medications were sent to [place] [address] [address], [address] [address] [address]      Phone: [phone]   clonazePAM 0.5 MG tablet           Medications marked \"taking\" at this time  Outpatient Medications Marked as Taking for the 8/25/22 encounter (Office Visit) with Beatriz Kemp MD   Medication Sig Dispense Refill    clonazePAM (KLONOPIN) 0.5 MG tablet TAKE 1 TABLET BY MOUTH EVERY NIGHT AS NEEDED FOR ANXIETY 30 tablet 2    Magnesium Oxide 400 MG CAPS Take 400 mg by mouth 3 times daily 270 capsule 3    albuterol sulfate HFA (VENTOLIN HFA) 108 (90 Base) MCG/ACT inhaler USE 2 INHALATIONS EVERY 6 HOURS AS NEEDED FOR WHEEZING 54 g 2    tiotropium-olodaterol (STIOLTO) 2.5-2.5 MCG/ACT AERS Inhale 2 puffs into the lungs daily 3 each 3    Multiple Vitamins-Minerals (MULTIVITAMIN WOMEN 50+) TABS TAKE 1 TABLET BY MOUTH EVERY DAY 90 tablet 1    metroNIDAZOLE (FLAGYL) 500 MG tablet TAKE 1 TABLET THREE TIMES A DAY FOR 10 DAYS PER MONTH 90 tablet 3    losartan (COZAAR) 50 MG tablet TAKE 1 TABLET DAILY 90 tablet 3    clopidogrel (PLAVIX) 75 MG tablet TAKE 1 TABLET DAILY 90 tablet 3    lipase-protease-amylase (CREON) 71783-59768 units delayed release capsule TAKE 1 CAPSULE TWICE A  capsule 3    mirtazapine (REMERON) 15 MG tablet TAKE 1 TABLET BY MOUTH EVERY NIGHT 90 tablet 3    pantoprazole (PROTONIX) 40 MG tablet TAKE 1 TABLET DAILY 90 tablet 3    propranolol (INDERAL) 40 MG tablet TAKE 1 TABLET TWICE A DAY (REPLACES PREVIOUS PRESCRIPTION) 180 tablet 3    clotrimazole-betamethasone (LOTRISONE) 1-0.05 % cream APPLY TOPICALLY TWICE A DAY 45 g 14        Medications patient taking as of now reconciled against medications ordered at time of hospital discharge: Yes    A comprehensive review of systems was negative except for what was noted in the HPI.     Objective:    /86 (Site: Left Upper Arm)   Pulse 74   Temp 97.3 °F (36.3 °C)   Ht 5' 3\" (1.6 m)   Wt 82 lb 6.4 oz (37.4 kg)   LMP  (LMP Unknown)   SpO2 96%   BMI 14.60 kg/m²   Physical Exam:  /86 (Site: Left Upper Arm)   Pulse 74   Temp 97.3 °F (36.3 °C)   Ht 5' 3\" (1.6 m)   Wt 82 lb 6.4 oz (37.4 kg)   LMP  (LMP Unknown)   SpO2 96%   BMI 14.60 kg/m²     Gen: Well, NAD, Alert, Oriented x 3   HEENT: EOMI, eyes clear, MMM  Skin: without rash or jaundice  Neck: no significant lymphadenopathy or thyromegaly  Lungs: CTA B w/out Rales/Wheezes/Rhonchi, Good respiratory effort   Heart: RRR, S1S2, w/out M/R/G, non-displaced PMI   Ext: No C/C/E Bilaterally. Neuro: Neurovascularly intact w/ Sensory/Motor intact UE/LE Bilaterally. An electronic signature was used to authenticate this note.   --Toshia Villalobos MD

## 2022-08-25 NOTE — CARE COORDINATION
[facility] Transitions Follow Up Call    2022    Patient: Khanh Friedman  Patient : 1945   MRN: <[id number]>  Reason for Admission: DEION; hypomagnesia, hypokalemia, severe malnutrition  Discharge Date: 22 RARS: Readmission Risk Score: 15.7         Spoke with: Cecy Dominguez, patient's daughter (On HIPAA)    Cecy Dominguez states that her mom is doing well. She states patient denies chest pain, shortness of breath, cough, n/v/d, fever or chills. No urinary concerns. She states appetite is good and family is making sure she stays hydrated. She did have HFU with PCP this morning. Patient continues to stay with her daughter. No other needs or concerns. Care Transitions Follow Up Call    Needs to be reviewed by the provider   Additional needs identified to be addressed with provider: No  none             Method of communication with provider : none      Care Transition Nurse contacted the family by telephone to follow up after admission on 22. Verified name and  with family as identifiers. Addressed changes since last contact: none  Discussed follow-up appointments. If no appointment was previously scheduled, appointment scheduling offered: Yes. Is follow up appointment scheduled within 7 days of discharge? Yes. Advance Care Planning:   Does patient have an Advance Directive: reviewed and current. CTN reviewed discharge instructions, medical action plan and red flags with family and discussed any barriers to care and/or understanding of plan of care after discharge. Discussed appropriate site of care based on symptoms and resources available to patient including: PCP  Specialist  When to call 911. The family agrees to contact the PCP office for questions related to their healthcare.      Patients top risk factors for readmission: medical condition-DEION  Interventions to address risk factors: Obtained and reviewed discharge summary and/or continuity of care documents      Non-[facility] follow up appointment(s):     CTN provided contact information for future needs. Plan for follow-up call in 5-7 days based on severity of symptoms and risk factors. Plan for next call: symptom management-DEION s/s.   medication management-taking medications as prescribed, any changes    Care Transitions Subsequent and Final Call    Subsequent and Final Calls  Care Transitions Interventions  Other Interventions:              Follow Up  Future Appointments   Date Time Provider Josiane Falcon   11/1/2022  1:30 PM Demetra Lowe, [facility], [place],[address]   11/15/2022  2:00 PM Atif Sen MD [place], [place]

## 2022-09-01 ENCOUNTER — CARE COORDINATION (OUTPATIENT)
Dept: CASE MANAGEMENT | Age: 77
End: 2022-09-01

## 2022-09-01 NOTE — CARE COORDINATION
[facility] Transitions Follow Up Call    2022    Patient: Khanh Friedman  Patient : 1945   MRN: [id number]  Reason for Admission: DEION  Discharge Date: 22 RARS: Readmission Risk Score: 15.7    Care Transitions Follow Up Call    Needs to be reviewed by the provider   Additional needs identified to be addressed with provider: No  none             Method of communication with provider : none      Care Transition Nurse contacted the family by telephone to follow up after admission on 22. Verified name and  with family as identifiers. Addressed changes since last contact: none  Discussed follow-up appointments. If no appointment was previously scheduled, appointment scheduling offered: Yes. Is follow up appointment scheduled within 7 days of discharge? Yes and confirmed with patient DTR Virginia Mckeon( that she completed HFu appt with Dr. Chen Schafer (PCP) on 22. Advance Care Planning:   Does patient have an Advance Directive: reviewed and current. CTN reviewed discharge instructions, medical action plan and red flags with family and discussed any barriers to care and/or understanding of plan of care after discharge. Discussed appropriate site of care based on symptoms and resources available to patient including: PCP  Urgent care clinics  When to call 911  Condition related references. The family agrees to contact the PCP office for questions related to their healthcare. Patients top risk factors for readmission: functional physical ability  level of motivation  medical condition-CKD, COPD and Dementia  polypharmacy    CTN provided contact information for future needs. Plan for follow-up call in 5-7 days based on severity of symptoms and risk factors.     Care Transitions Subsequent and Final Call    Subsequent and Final Calls  Do you have any ongoing symptoms?: No  Have your medications changed?: No  Do you have any questions related to your medications?: No  Do you currently have any active services?: No  Do you have any needs or concerns that I can assist you with?: No  Identified Barriers: Lack of Motivation, Other  Care Transitions Interventions    Registered Dietician: Declined      Smoking Cessation: Declined    Other Interventions:           Spoke with patient DTR Jeanne Garcia) on communication release form regarding TCM/hospital discharge follow up sub call. Colette Clayton reports patient continues doing well and offers no complaints. Confirmed patient was admitted to [facility] AT [place] for DEION. Noted BUN/Creatinine on admission was 48/2.37 which improved to 32/1. 45. Noted patient has underlying CRD. Denies patient having anymore hand tingling or feeling \"shaky\". States patient ambulates utilizing a walker with no issues. Colette Clayton denies patient having any worsening shortness of breath which is chronic d/t COPD and admits breathing is at baseline. Denies patient being on home oxygen therapy but is a current smoker. Colette Red states patient smokes 2 ppd of cigarettes and is not interested in NRT. Colette Clayton states patient lives with her and she is also a smoker. Denies patient having any chest pain, chest discomfort, abdominal pain, nausea, vomiting, chills or fever. Colette Clayton states patient has decreased appetite on \"some days\" but admits using Ensure shakes for supplementation. States decreased appetite is r/t to underlying dementia. Lara politely declines dietician/smoking cessation referral at this time. Confirmed with Colette Clayton patient completed HFU appt stephy Miner (PCP) on 8/25/22. Denies any new meds or changes in meds. Denies any complaints or needs at this time. CTN will continue to follow for Care Transition.      Follow Up  Future Appointments   Date Time Provider Josiane Falcon   11/1/2022  1:30 PM Anil Merchant, [address] [facility],4[address] Floor   11/15/2022  2:00 PM Mihai Perry MD [facility] EMERGENCY [facility] CENTER AT [place]       CHEVY Mendoza no fever

## 2022-09-08 ENCOUNTER — CARE COORDINATION (OUTPATIENT)
Dept: CARE COORDINATION | Age: 77
End: 2022-09-08

## 2022-09-08 NOTE — CARE COORDINATION
[facility] Transitions Follow Up Call    2022    Patient: Kasie Lin  Patient : 1945   MRN: <[id number]>  Reason for Admission: DEION; hypomagnesia, hypokalemia, severe malnutrition  Discharge Date: 22 RARS: Readmission Risk Score: 15.7         Spoke with: Pts dtr Lara. States pt is doing well. Denies chest pain, sob or dizziness. States she is eating ok, some days are better than others. States she is increasing water intake. No n/v/d states she is up and able to move around on own. No reports of falls. Pt did follow up with pcp. No home, med or transportation needs. Ctn info given and will sign off Care Transitions Follow Up Call    Needs to be reviewed by the provider   Additional needs identified to be addressed with provider: No  none             Method of communication with provider : none      Care Transition Nurse contacted the family by telephone to follow up after admission on -2022. Verified name and  with family as identifiers. Addressed changes since last contact: none  Discussed follow-up appointments. If no appointment was previously scheduled, appointment scheduling offered: Yes. Is follow up appointment scheduled within 7 days of discharge? Yes. Advance Care Planning:   Does patient have an Advance Directive:  not reviewed . CTN reviewed discharge instructions, medical action plan and red flags with family and discussed any barriers to care and/or understanding of plan of care after discharge. Discussed appropriate site of care based on symptoms and resources available to patient including: PCP  When to call 911. The family agrees to contact the PCP office for questions related to their healthcare.      Patients top risk factors for readmission: medical condition-DEION  Interventions to address risk factors: Obtained and reviewed discharge summary and/or continuity of care documents      Non-[facility] follow up appointment(s): NA    CTN provided contact information for future needs. No further follow-up call indicated based on severity of symptoms and risk factors. Plan for next call:           Care Transitions Subsequent and Final Call    Subsequent and Final Calls  Care Transitions Interventions    Registered Dietician: Declined      Smoking Cessation: Declined    Other Interventions:              Follow Up  Future Appointments   Date Time Provider Josiane Falcon   11/1/2022  1:30 PM Shar Chowdary, [address] [facility],4Th Floor   11/15/2022  2:00 PM Anusha Allen MD [place], [place]

## 2022-09-12 DIAGNOSIS — Z76.0 MEDICATION REFILL: ICD-10-CM

## 2022-09-12 DIAGNOSIS — I10 ESSENTIAL HYPERTENSION: ICD-10-CM

## 2022-09-12 RX ORDER — PROPRANOLOL HYDROCHLORIDE 40 MG/1
TABLET ORAL
Qty: 180 TABLET | Refills: 3 | Status: SHIPPED | OUTPATIENT
Start: 2022-09-12

## 2022-09-14 DIAGNOSIS — Z76.0 MEDICATION REFILL: ICD-10-CM

## 2022-09-14 DIAGNOSIS — K86.89 PANCREATIC INSUFFICIENCY: ICD-10-CM

## 2022-09-14 DIAGNOSIS — G45.9 TRANSIENT CEREBRAL ISCHEMIA, UNSPECIFIED TYPE: ICD-10-CM

## 2022-09-14 DIAGNOSIS — K21.9 GASTROESOPHAGEAL REFLUX DISEASE, UNSPECIFIED WHETHER ESOPHAGITIS PRESENT: ICD-10-CM

## 2022-09-14 RX ORDER — PANTOPRAZOLE SODIUM 40 MG/1
TABLET, DELAYED RELEASE ORAL
Qty: 90 TABLET | Refills: 3 | Status: SHIPPED | OUTPATIENT
Start: 2022-09-14

## 2022-09-14 RX ORDER — CLOPIDOGREL BISULFATE 75 MG/1
TABLET ORAL
Qty: 90 TABLET | Refills: 3 | Status: SHIPPED | OUTPATIENT
Start: 2022-09-14

## 2022-09-14 RX ORDER — PANCRELIPASE 24000; 76000; 120000 [USP'U]/1; [USP'U]/1; [USP'U]/1
CAPSULE, DELAYED RELEASE PELLETS ORAL
Qty: 180 CAPSULE | Refills: 3 | Status: SHIPPED | OUTPATIENT
Start: 2022-09-14

## 2022-09-14 NOTE — TELEPHONE ENCOUNTER
Future Appointments    Encounter Information    Provider Department Appt Notes   11/1/2022 Es Nelson, [address] [place] [address] Pulmonology 4 month follow up COPD   11/15/2022 Lola Modi MD [facility] Primary Care awv+ 6 month follow up     Past Visits    Date Provider Specialty Visit Type Primary Dx   08/25/2022 Lola Modi MD Family Medicine Office Visit DEION (acute kidney injury) [facility])   07/01/2022 Es Nelson MD Pulmonology Office Visit Chronic obstructive pulmonary disease, unspecified COPD type ([facility] [address]   05/13/2022 Lola Modi MD Family Medicine Office Visit Anxiety   03/11/2022 Lola Modi MD Family Medicine Office Visit B12 deficiency

## 2022-09-14 NOTE — PROGRESS NOTES
07:15am Report from night shift RN. KELVIN mp-SR. Alert and oriented. Avisis camera.   No complaints at this time CONSULTATION NOTE    NAME:      Carol Nayak  :                                                          1954  DATE OF CONSULTATION:                       22  REQUESTING PHYSICIAN:                   Erica Gay MD  REASON FOR CONSULTATION:           Pathologic T1 cN0 M0 stage I invasive ductal adenocarcinoma of the right breast, upper outer quadrant         BRIEF HISTORY:  Carol Nayak  is a very pleasant 67 y.o. female from [place], [place] who on imaging was found to have a 1.2 cm irregular mass at the 11 o'clock position of the right breast 3 cm from the nipple.  There were normal appearing axillary lymph nodes on the right.  She underwent right breast lumpectomy for intermediate to high grade invasive ductal adenocarcinoma measuring 1.4 x 1.3 x 1 cm.  0/1 lymph node was positive for tumor.  Initially the inferior margin was 1 mm that extended posterior lateral margin was negative.  Tumor was ER/DE positive HER2/cesario negative.  Ms. Sparks is here to discuss postoperative radiation.  She is accompanied by her  who is a fisherman.  They enjoyed crafts and do laser work on wood and acrylic.    Age at menarche:  15  Age at menopause:  41  Hormone replacement therapy:  yes 3 years and stopped  Personal history of breast cancer:  no  Family history of breast cancer:  yes; family member 2 sisters  Radiation to chest before age of 31:  no  Age of first live birth:  21    BMI:  Body mass index is 30.89 kg/m².      Social History     Substance and Sexual Activity   Alcohol Use Never       No Known Allergies    Social History     Tobacco Use   • Smoking status: Former Smoker     Packs/day: 2.00     Years: 50.00     Pack years: 100.00     Types: Cigarettes     Quit date: 2022     Years since quittin.1   • Smokeless tobacco: Never Used   Vaping Use   • Vaping Use: Every day   • Start date: 2022   Substance Use Topics   • Alcohol use: Never   • Drug use: Never         Past  "Medical History:   Diagnosis Date   • Brain cancer (HCC)    • Breast cancer (HCC)        family history includes Breast cancer (age of onset: 40) in her sister; Breast cancer (age of onset: 69) in her sister; Colon cancer in her mother; Ovarian cancer (age of onset: 64) in her paternal grandmother; Ovarian cancer (age of onset: 90) in her maternal grandmother.     Past Surgical History:   Procedure Laterality Date   • APPENDECTOMY     • BREAST EXCISIONAL BIOPSY Right     UNABLE TO VISUALIZE SCAR   • CERVICAL DISCECTOMY ANTERIOR     • FOOT SURGERY Right    • MANDIBLE FRACTURE SURGERY Left    • PATELLA FRACTURE SURGERY Right    • SHOULDER SURGERY Left    • THORACIC DISC SURGERY          Review of Systems   Respiratory: Positive for shortness of breath (with exertion).         Quit smoking 2 months ago, vapes   All other systems reviewed and are negative.          Objective   VITAL SIGNS:   Vitals:    09/14/22 1102   BP: 117/69   Pulse: 78   Resp: 16   Weight: 79.1 kg (174 lb 6.4 oz)   Height: 160 cm (63\")   PainSc: 0-No pain        KPS      90%    Physical Exam  Vitals reviewed.   Constitutional:       Appearance: Normal appearance.   Cardiovascular:      Rate and Rhythm: Normal rate and regular rhythm.   Pulmonary:      Effort: Pulmonary effort is normal.      Breath sounds: Normal breath sounds.   Neurological:      Mental Status: She is alert.     The breast exam reveals the left breast is negative.  The right breast has a well-healing surgical incision from the 11 to 1 o'clock position adjacent to the nipple areolar complex.  She has ecchymosis associated.  She has a well-healing incision in the axilla.         The following portions of the patient's history were reviewed and updated as appropriate: allergies, current medications, past family history, past medical history, past social history, past surgical history and problem list.    Assessment      IMPRESSION:    Carol Nayak  is a very pleasant 67 y.o. " female  who underwent right breast lumpectomy for invasive ductal adenocarcinoma measuring 1.4 x 1.3 x 1 cm.  0/1 lymph node was positive for tumor.  Initially the inferior margin was 1 mm that extended posterior lateral margin was negative.  Tumor was ER/SD positive HER2/cesario negative.  Ms. Sparks is here to discuss postoperative radiation.          RECOMMENDATIONS: I discussed the pros and cons, risks and benefits of postoperative radiation with the patient and her .  I recommend 40.05 David in 15 fractions and a tumor bed boost of 10 Gray in 5 fractions for this intermediate to high-grade tumor.  She will see Dr. Solano today at 2:00.  She will call our office when she is ready to return for treatment.  It is a pleasure to meet Ms. Sparks.               Maritza Grady MD    Total time of patient care on day of service including time prior to, face to face with patient, and following visit spent in reviewing records, lab results, imaging studies, discussion with patient, and documentation/charting was > 45 minutes.    Errors in dictation may reflect use of voice recognition software and not all errors in transcription may have been detected prior to signing.

## 2022-09-20 ENCOUNTER — TELEPHONE (OUTPATIENT)
Dept: FAMILY MEDICINE CLINIC | Age: 77
End: 2022-09-20

## 2022-09-20 DIAGNOSIS — R19.7 DIARRHEA, UNSPECIFIED TYPE: Primary | ICD-10-CM

## 2022-09-20 RX ORDER — DIPHENOXYLATE HYDROCHLORIDE AND ATROPINE SULFATE 2.5; .025 MG/1; MG/1
1 TABLET ORAL 4 TIMES DAILY PRN
Qty: 40 TABLET | Refills: 0 | Status: SHIPPED | OUTPATIENT
Start: 2022-09-20 | End: 2022-09-30

## 2022-09-20 NOTE — TELEPHONE ENCOUNTER
----- Message from Felix Mcleod LPN sent at 9/52/0853 10:47 AM EDT -----  Subject: Message to Provider    QUESTIONS  Information for Provider? Daughter Kópasker calling Mom is having issues   holding bowel movements and was wondering if that could be related to her   magnesium, prior she was constipated by now its the other way   ---------------------------------------------------------------------------  --------------  Sasha Sterling INFO  [phone]; OK to leave message on voicemail  ---------------------------------------------------------------------------  --------------  SCRIPT ANSWERS  Relationship to Patient? Other  Representative Name? reid  Is the Representative on the appropriate HIPAA document in Epic?  Yes

## 2022-09-20 NOTE — TELEPHONE ENCOUNTER
Orders Placed This Encounter   Medications    diphenoxylate-atropine (DIPHENATOL) 2.5-0.025 MG per tablet     Sig: Take 1 tablet by mouth 4 times daily as needed for Diarrhea for up to 10 days. Dispense:  40 tablet     Refill:  0       The above med(s) were e-scripted to the patient's pharmacy.    Please advise patient  Chayito Gagnon MD

## 2022-09-25 ENCOUNTER — APPOINTMENT (OUTPATIENT)
Dept: GENERAL RADIOLOGY | Age: 77
DRG: 689 | End: 2022-09-25
Payer: MEDICARE

## 2022-09-25 ENCOUNTER — HOSPITAL ENCOUNTER (INPATIENT)
Age: 77
LOS: 3 days | Discharge: HOSPICE | DRG: 689 | End: 2022-09-29
Attending: STUDENT IN AN ORGANIZED HEALTH CARE EDUCATION/TRAINING PROGRAM | Admitting: INTERNAL MEDICINE
Payer: MEDICARE

## 2022-09-25 DIAGNOSIS — R50.9 FEVER, UNSPECIFIED FEVER CAUSE: ICD-10-CM

## 2022-09-25 DIAGNOSIS — R41.82 ALTERED MENTAL STATUS, UNSPECIFIED ALTERED MENTAL STATUS TYPE: Primary | ICD-10-CM

## 2022-09-25 LAB
ALBUMIN SERPL-MCNC: 4.2 G/DL (ref 3.5–4.6)
ALP BLD-CCNC: 77 U/L (ref 40–130)
ALT SERPL-CCNC: 13 U/L (ref 0–33)
ANION GAP SERPL CALCULATED.3IONS-SCNC: 17 MEQ/L (ref 9–15)
APTT: 30.5 SEC (ref 24.4–36.8)
AST SERPL-CCNC: 21 U/L (ref 0–35)
BASOPHILS ABSOLUTE: 0 K/UL (ref 0–0.2)
BASOPHILS RELATIVE PERCENT: 0.5 %
BILIRUB SERPL-MCNC: 0.3 MG/DL (ref 0.2–0.7)
BUN BLDV-MCNC: 62 MG/DL (ref 8–23)
CALCIUM SERPL-MCNC: 9.1 MG/DL (ref 8.5–9.9)
CHLORIDE BLD-SCNC: 110 MEQ/L (ref 95–107)
CO2: 14 MEQ/L (ref 20–31)
CREAT SERPL-MCNC: 2.9 MG/DL (ref 0.5–0.9)
EOSINOPHILS ABSOLUTE: 0.1 K/UL (ref 0–0.7)
EOSINOPHILS RELATIVE PERCENT: 2 %
GFR AFRICAN AMERICAN: 19.1
GFR NON-AFRICAN AMERICAN: 15.8
GLOBULIN: 2.8 G/DL (ref 2.3–3.5)
GLUCOSE BLD-MCNC: 98 MG/DL (ref 70–99)
HCT VFR BLD CALC: 38.1 % (ref 37–47)
HEMOGLOBIN: 12.8 G/DL (ref 12–16)
INR BLD: 1
LYMPHOCYTES ABSOLUTE: 1 K/UL (ref 1–4.8)
LYMPHOCYTES RELATIVE PERCENT: 14 %
MACROCYTES: ABNORMAL
MCH RBC QN AUTO: 35.5 PG (ref 27–31.3)
MCHC RBC AUTO-ENTMCNC: 33.6 % (ref 33–37)
MCV RBC AUTO: 105.6 FL (ref 82–100)
MONOCYTES ABSOLUTE: 0.1 K/UL (ref 0.2–0.8)
MONOCYTES RELATIVE PERCENT: 1.9 %
NEUTROPHILS ABSOLUTE: 6.1 K/UL (ref 1.4–6.5)
NEUTROPHILS RELATIVE PERCENT: 83 %
PDW BLD-RTO: 14.6 % (ref 11.5–14.5)
PLATELET # BLD: 216 K/UL (ref 130–400)
PLATELET SLIDE REVIEW: NORMAL
POTASSIUM SERPL-SCNC: 3.6 MEQ/L (ref 3.4–4.9)
PROTHROMBIN TIME: 13.6 SEC (ref 12.3–14.9)
RBC # BLD: 3.61 M/UL (ref 4.2–5.4)
SARS-COV-2, NAAT: NOT DETECTED
SODIUM BLD-SCNC: 141 MEQ/L (ref 135–144)
TOTAL PROTEIN: 7 G/DL (ref 6.3–8)
WBC # BLD: 7.4 K/UL (ref 4.8–10.8)

## 2022-09-25 PROCEDURE — 99285 EMERGENCY DEPT VISIT HI MDM: CPT

## 2022-09-25 PROCEDURE — 87635 SARS-COV-2 COVID-19 AMP PRB: CPT

## 2022-09-25 PROCEDURE — 71045 X-RAY EXAM CHEST 1 VIEW: CPT

## 2022-09-25 PROCEDURE — 87040 BLOOD CULTURE FOR BACTERIA: CPT

## 2022-09-25 PROCEDURE — 83735 ASSAY OF MAGNESIUM: CPT

## 2022-09-25 PROCEDURE — 96361 HYDRATE IV INFUSION ADD-ON: CPT

## 2022-09-25 PROCEDURE — 96360 HYDRATION IV INFUSION INIT: CPT

## 2022-09-25 PROCEDURE — 36415 COLL VENOUS BLD VENIPUNCTURE: CPT

## 2022-09-25 PROCEDURE — 83880 ASSAY OF NATRIURETIC PEPTIDE: CPT

## 2022-09-25 PROCEDURE — 80053 COMPREHEN METABOLIC PANEL: CPT

## 2022-09-25 PROCEDURE — 2580000003 HC RX 258: Performed by: STUDENT IN AN ORGANIZED HEALTH CARE EDUCATION/TRAINING PROGRAM

## 2022-09-25 PROCEDURE — 85610 PROTHROMBIN TIME: CPT

## 2022-09-25 PROCEDURE — 6370000000 HC RX 637 (ALT 250 FOR IP): Performed by: STUDENT IN AN ORGANIZED HEALTH CARE EDUCATION/TRAINING PROGRAM

## 2022-09-25 PROCEDURE — 84439 ASSAY OF FREE THYROXINE: CPT

## 2022-09-25 PROCEDURE — 82550 ASSAY OF CK (CPK): CPT

## 2022-09-25 PROCEDURE — 84145 PROCALCITONIN (PCT): CPT

## 2022-09-25 PROCEDURE — 83605 ASSAY OF LACTIC ACID: CPT

## 2022-09-25 PROCEDURE — 85025 COMPLETE CBC W/AUTO DIFF WBC: CPT

## 2022-09-25 PROCEDURE — 84484 ASSAY OF TROPONIN QUANT: CPT

## 2022-09-25 PROCEDURE — 85730 THROMBOPLASTIN TIME PARTIAL: CPT

## 2022-09-25 PROCEDURE — 93005 ELECTROCARDIOGRAM TRACING: CPT | Performed by: STUDENT IN AN ORGANIZED HEALTH CARE EDUCATION/TRAINING PROGRAM

## 2022-09-25 PROCEDURE — 84443 ASSAY THYROID STIM HORMONE: CPT

## 2022-09-25 PROCEDURE — 81001 URINALYSIS AUTO W/SCOPE: CPT

## 2022-09-25 RX ORDER — 0.9 % SODIUM CHLORIDE 0.9 %
1000 INTRAVENOUS SOLUTION INTRAVENOUS ONCE
Status: COMPLETED | OUTPATIENT
Start: 2022-09-25 | End: 2022-09-26

## 2022-09-25 RX ORDER — ACETAMINOPHEN 500 MG
1000 TABLET ORAL ONCE
Status: COMPLETED | OUTPATIENT
Start: 2022-09-25 | End: 2022-09-25

## 2022-09-25 RX ADMIN — SODIUM CHLORIDE 1000 ML: 9 INJECTION, SOLUTION INTRAVENOUS at 22:55

## 2022-09-25 RX ADMIN — ACETAMINOPHEN 1000 MG: 500 TABLET ORAL at 22:53

## 2022-09-25 ASSESSMENT — PAIN - FUNCTIONAL ASSESSMENT: PAIN_FUNCTIONAL_ASSESSMENT: NONE - DENIES PAIN

## 2022-09-26 PROBLEM — R41.82 AMS (ALTERED MENTAL STATUS): Status: ACTIVE | Noted: 2022-01-01

## 2022-09-26 LAB
ADENOVIRUS BY PCR: NOT DETECTED
ALBUMIN SERPL-MCNC: 3.8 G/DL (ref 3.5–4.6)
ALP BLD-CCNC: 71 U/L (ref 40–130)
ALT SERPL-CCNC: 11 U/L (ref 0–33)
ANION GAP SERPL CALCULATED.3IONS-SCNC: 17 MEQ/L (ref 9–15)
AST SERPL-CCNC: 20 U/L (ref 0–35)
BACTERIA: ABNORMAL /HPF
BANDED NEUTROPHILS RELATIVE PERCENT: 2 % (ref 5–11)
BASOPHILS ABSOLUTE: 0 K/UL (ref 0–0.2)
BASOPHILS RELATIVE PERCENT: 0.2 %
BILIRUB SERPL-MCNC: 0.3 MG/DL (ref 0.2–0.7)
BILIRUBIN URINE: NEGATIVE
BLOOD, URINE: NEGATIVE
BORDETELLA PARAPERTUSSIS BY PCR: NOT DETECTED
BORDETELLA PERTUSSIS BY PCR: NOT DETECTED
BUN BLDV-MCNC: 58 MG/DL (ref 8–23)
CALCIUM SERPL-MCNC: 8.4 MG/DL (ref 8.5–9.9)
CHLAMYDOPHILIA PNEUMONIAE BY PCR: NOT DETECTED
CHLORIDE BLD-SCNC: 110 MEQ/L (ref 95–107)
CLARITY: CLEAR
CO2: 12 MEQ/L (ref 20–31)
COLOR: YELLOW
CORONAVIRUS 229E BY PCR: NOT DETECTED
CORONAVIRUS HKU1 BY PCR: NOT DETECTED
CORONAVIRUS NL63 BY PCR: NOT DETECTED
CORONAVIRUS OC43 BY PCR: NOT DETECTED
CREAT SERPL-MCNC: 2.73 MG/DL (ref 0.5–0.9)
EKG ATRIAL RATE: 86 BPM
EKG P AXIS: 48 DEGREES
EKG P-R INTERVAL: 130 MS
EKG Q-T INTERVAL: 352 MS
EKG QRS DURATION: 86 MS
EKG QTC CALCULATION (BAZETT): 421 MS
EKG R AXIS: 17 DEGREES
EKG T AXIS: -19 DEGREES
EKG VENTRICULAR RATE: 86 BPM
EOSINOPHILS ABSOLUTE: 0.1 K/UL (ref 0–0.7)
EOSINOPHILS RELATIVE PERCENT: 1 %
EPITHELIAL CELLS, UA: ABNORMAL /HPF (ref 0–5)
GFR AFRICAN AMERICAN: 20.4
GFR NON-AFRICAN AMERICAN: 16.9
GLOBULIN: 2.4 G/DL (ref 2.3–3.5)
GLUCOSE BLD-MCNC: 85 MG/DL (ref 70–99)
GLUCOSE URINE: NEGATIVE MG/DL
HCT VFR BLD CALC: 35.4 % (ref 37–47)
HEMOGLOBIN: 11.6 G/DL (ref 12–16)
HUMAN METAPNEUMOVIRUS BY PCR: NOT DETECTED
HUMAN RHINOVIRUS/ENTEROVIRUS BY PCR: NOT DETECTED
HYALINE CASTS: ABNORMAL /HPF (ref 0–5)
INFLUENZA A BY PCR: NOT DETECTED
INFLUENZA B BY PCR: NOT DETECTED
KETONES, URINE: ABNORMAL MG/DL
LACTIC ACID: 1 MMOL/L (ref 0.5–2.2)
LEUKOCYTE ESTERASE, URINE: ABNORMAL
LYMPHOCYTES ABSOLUTE: 0.8 K/UL (ref 1–4.8)
LYMPHOCYTES RELATIVE PERCENT: 13 %
MAGNESIUM: 2.1 MG/DL (ref 1.7–2.4)
MAGNESIUM: 2.2 MG/DL (ref 1.7–2.4)
MCH RBC QN AUTO: 35 PG (ref 27–31.3)
MCHC RBC AUTO-ENTMCNC: 32.7 % (ref 33–37)
MCV RBC AUTO: 107.1 FL (ref 82–100)
MICROCYTES: ABNORMAL
MONOCYTES ABSOLUTE: 0.3 K/UL (ref 0.2–0.8)
MONOCYTES RELATIVE PERCENT: 4.8 %
MYCOPLASMA PNEUMONIAE BY PCR: NOT DETECTED
NEUTROPHILS ABSOLUTE: 4.8 K/UL (ref 1.4–6.5)
NEUTROPHILS RELATIVE PERCENT: 80 %
NITRITE, URINE: NEGATIVE
PARAINFLUENZA VIRUS 1 BY PCR: NOT DETECTED
PARAINFLUENZA VIRUS 2 BY PCR: NOT DETECTED
PARAINFLUENZA VIRUS 3 BY PCR: NOT DETECTED
PARAINFLUENZA VIRUS 4 BY PCR: NOT DETECTED
PDW BLD-RTO: 14.7 % (ref 11.5–14.5)
PH UA: 6 (ref 5–9)
PLATELET # BLD: 154 K/UL (ref 130–400)
PLATELET SLIDE REVIEW: NORMAL
POTASSIUM SERPL-SCNC: 2.6 MEQ/L (ref 3.4–4.9)
PRO-BNP: NORMAL PG/ML
PROCALCITONIN: 0.16 NG/ML (ref 0–0.15)
PROTEIN UA: 100 MG/DL
RBC # BLD: 3.3 M/UL (ref 4.2–5.4)
RESPIRATORY SYNCYTIAL VIRUS BY PCR: NOT DETECTED
SARS-COV-2, PCR: NOT DETECTED
SMUDGE CELLS: 1
SODIUM BLD-SCNC: 139 MEQ/L (ref 135–144)
SPECIFIC GRAVITY UA: 1.02 (ref 1–1.03)
T4 FREE: 1.03 NG/DL (ref 0.84–1.68)
TOTAL CK: 40 U/L (ref 0–170)
TOTAL PROTEIN: 6.2 G/DL (ref 6.3–8)
TROPONIN: 0.01 NG/ML (ref 0–0.01)
TROPONIN: 0.01 NG/ML (ref 0–0.01)
TROPONIN: 0.02 NG/ML (ref 0–0.01)
TSH REFLEX: 5.3 UIU/ML (ref 0.44–3.86)
URINE REFLEX TO CULTURE: YES
UROBILINOGEN, URINE: 0.2 E.U./DL
WBC # BLD: 5.9 K/UL (ref 4.8–10.8)
WBC UA: ABNORMAL /HPF (ref 0–5)

## 2022-09-26 PROCEDURE — 97162 PT EVAL MOD COMPLEX 30 MIN: CPT

## 2022-09-26 PROCEDURE — 80053 COMPREHEN METABOLIC PANEL: CPT

## 2022-09-26 PROCEDURE — 2060000000 HC ICU INTERMEDIATE R&B

## 2022-09-26 PROCEDURE — 2580000003 HC RX 258: Performed by: INTERNAL MEDICINE

## 2022-09-26 PROCEDURE — 87077 CULTURE AEROBIC IDENTIFY: CPT

## 2022-09-26 PROCEDURE — 36415 COLL VENOUS BLD VENIPUNCTURE: CPT

## 2022-09-26 PROCEDURE — 6360000002 HC RX W HCPCS: Performed by: NURSE PRACTITIONER

## 2022-09-26 PROCEDURE — 87086 URINE CULTURE/COLONY COUNT: CPT

## 2022-09-26 PROCEDURE — 97166 OT EVAL MOD COMPLEX 45 MIN: CPT

## 2022-09-26 PROCEDURE — 82607 VITAMIN B-12: CPT

## 2022-09-26 PROCEDURE — 6370000000 HC RX 637 (ALT 250 FOR IP): Performed by: INTERNAL MEDICINE

## 2022-09-26 PROCEDURE — 82746 ASSAY OF FOLIC ACID SERUM: CPT

## 2022-09-26 PROCEDURE — 6360000002 HC RX W HCPCS: Performed by: INTERNAL MEDICINE

## 2022-09-26 PROCEDURE — 83735 ASSAY OF MAGNESIUM: CPT

## 2022-09-26 PROCEDURE — 93010 ELECTROCARDIOGRAM REPORT: CPT | Performed by: INTERNAL MEDICINE

## 2022-09-26 PROCEDURE — 87186 SC STD MICRODIL/AGAR DIL: CPT

## 2022-09-26 PROCEDURE — 2580000003 HC RX 258: Performed by: NURSE PRACTITIONER

## 2022-09-26 PROCEDURE — 84484 ASSAY OF TROPONIN QUANT: CPT

## 2022-09-26 PROCEDURE — 0202U NFCT DS 22 TRGT SARS-COV-2: CPT

## 2022-09-26 PROCEDURE — 85025 COMPLETE CBC W/AUTO DIFF WBC: CPT

## 2022-09-26 PROCEDURE — 87040 BLOOD CULTURE FOR BACTERIA: CPT

## 2022-09-26 PROCEDURE — 99222 1ST HOSP IP/OBS MODERATE 55: CPT | Performed by: INTERNAL MEDICINE

## 2022-09-26 RX ORDER — ACETAMINOPHEN 650 MG/1
650 SUPPOSITORY RECTAL EVERY 6 HOURS PRN
Status: DISCONTINUED | OUTPATIENT
Start: 2022-09-26 | End: 2022-09-29 | Stop reason: HOSPADM

## 2022-09-26 RX ORDER — SODIUM CHLORIDE 9 MG/ML
INJECTION, SOLUTION INTRAVENOUS PRN
Status: DISCONTINUED | OUTPATIENT
Start: 2022-09-26 | End: 2022-09-29 | Stop reason: HOSPADM

## 2022-09-26 RX ORDER — POTASSIUM CHLORIDE 20 MEQ/1
40 TABLET, EXTENDED RELEASE ORAL ONCE
Status: COMPLETED | OUTPATIENT
Start: 2022-09-26 | End: 2022-09-26

## 2022-09-26 RX ORDER — ONDANSETRON 4 MG/1
4 TABLET, ORALLY DISINTEGRATING ORAL EVERY 8 HOURS PRN
Status: DISCONTINUED | OUTPATIENT
Start: 2022-09-26 | End: 2022-09-29 | Stop reason: HOSPADM

## 2022-09-26 RX ORDER — POLYETHYLENE GLYCOL 3350 17 G/17G
17 POWDER, FOR SOLUTION ORAL DAILY PRN
Status: DISCONTINUED | OUTPATIENT
Start: 2022-09-26 | End: 2022-09-29 | Stop reason: HOSPADM

## 2022-09-26 RX ORDER — SODIUM CHLORIDE 9 MG/ML
INJECTION, SOLUTION INTRAVENOUS CONTINUOUS
Status: DISCONTINUED | OUTPATIENT
Start: 2022-09-26 | End: 2022-09-29 | Stop reason: HOSPADM

## 2022-09-26 RX ORDER — ACETAMINOPHEN 325 MG/1
650 TABLET ORAL EVERY 6 HOURS PRN
Status: DISCONTINUED | OUTPATIENT
Start: 2022-09-26 | End: 2022-09-29 | Stop reason: HOSPADM

## 2022-09-26 RX ORDER — LOSARTAN POTASSIUM 50 MG/1
50 TABLET ORAL DAILY
Status: DISCONTINUED | OUTPATIENT
Start: 2022-09-26 | End: 2022-09-29 | Stop reason: HOSPADM

## 2022-09-26 RX ORDER — FAMOTIDINE 20 MG/1
20 TABLET, FILM COATED ORAL NIGHTLY
COMMUNITY
Start: 2022-09-12

## 2022-09-26 RX ORDER — MAGNESIUM SULFATE IN WATER 40 MG/ML
2000 INJECTION, SOLUTION INTRAVENOUS PRN
Status: DISCONTINUED | OUTPATIENT
Start: 2022-09-26 | End: 2022-09-26

## 2022-09-26 RX ORDER — HEPARIN SODIUM 5000 [USP'U]/ML
5000 INJECTION, SOLUTION INTRAVENOUS; SUBCUTANEOUS 2 TIMES DAILY
Status: DISCONTINUED | OUTPATIENT
Start: 2022-09-26 | End: 2022-09-29 | Stop reason: HOSPADM

## 2022-09-26 RX ORDER — SODIUM CHLORIDE 0.9 % (FLUSH) 0.9 %
5-40 SYRINGE (ML) INJECTION EVERY 12 HOURS SCHEDULED
Status: DISCONTINUED | OUTPATIENT
Start: 2022-09-26 | End: 2022-09-29 | Stop reason: HOSPADM

## 2022-09-26 RX ORDER — PROPRANOLOL HYDROCHLORIDE 40 MG/1
40 TABLET ORAL 2 TIMES DAILY
Status: DISCONTINUED | OUTPATIENT
Start: 2022-09-26 | End: 2022-09-29 | Stop reason: HOSPADM

## 2022-09-26 RX ORDER — MIRTAZAPINE 15 MG/1
15 TABLET, FILM COATED ORAL NIGHTLY
Status: DISCONTINUED | OUTPATIENT
Start: 2022-09-26 | End: 2022-09-29 | Stop reason: HOSPADM

## 2022-09-26 RX ORDER — SODIUM CHLORIDE 0.9 % (FLUSH) 0.9 %
5-40 SYRINGE (ML) INJECTION PRN
Status: DISCONTINUED | OUTPATIENT
Start: 2022-09-26 | End: 2022-09-29 | Stop reason: HOSPADM

## 2022-09-26 RX ORDER — ONDANSETRON 2 MG/ML
4 INJECTION INTRAMUSCULAR; INTRAVENOUS EVERY 6 HOURS PRN
Status: DISCONTINUED | OUTPATIENT
Start: 2022-09-26 | End: 2022-09-29 | Stop reason: HOSPADM

## 2022-09-26 RX ORDER — POTASSIUM CHLORIDE 7.45 MG/ML
10 INJECTION INTRAVENOUS
Status: COMPLETED | OUTPATIENT
Start: 2022-09-26 | End: 2022-09-26

## 2022-09-26 RX ORDER — POTASSIUM BICARBONATE 25 MEQ/1
50 TABLET, EFFERVESCENT ORAL PRN
Status: DISCONTINUED | OUTPATIENT
Start: 2022-09-26 | End: 2022-09-26

## 2022-09-26 RX ORDER — POTASSIUM CHLORIDE 7.45 MG/ML
10 INJECTION INTRAVENOUS PRN
Status: DISCONTINUED | OUTPATIENT
Start: 2022-09-26 | End: 2022-09-26

## 2022-09-26 RX ORDER — CLOPIDOGREL BISULFATE 75 MG/1
75 TABLET ORAL DAILY
Status: DISCONTINUED | OUTPATIENT
Start: 2022-09-26 | End: 2022-09-29 | Stop reason: HOSPADM

## 2022-09-26 RX ORDER — POTASSIUM CHLORIDE 20 MEQ/1
40 TABLET, EXTENDED RELEASE ORAL PRN
Status: DISCONTINUED | OUTPATIENT
Start: 2022-09-26 | End: 2022-09-26

## 2022-09-26 RX ADMIN — HEPARIN SODIUM 5000 UNITS: 5000 INJECTION INTRAVENOUS; SUBCUTANEOUS at 11:09

## 2022-09-26 RX ADMIN — Medication 10 ML: at 21:53

## 2022-09-26 RX ADMIN — PROPRANOLOL HYDROCHLORIDE 40 MG: 40 TABLET ORAL at 21:50

## 2022-09-26 RX ADMIN — POTASSIUM CHLORIDE 10 MEQ: 7.46 INJECTION, SOLUTION INTRAVENOUS at 18:25

## 2022-09-26 RX ADMIN — PANCRELIPASE 24000 UNITS: 60000; 12000; 38000 CAPSULE, DELAYED RELEASE PELLETS ORAL at 16:19

## 2022-09-26 RX ADMIN — PROPRANOLOL HYDROCHLORIDE 40 MG: 40 TABLET ORAL at 14:51

## 2022-09-26 RX ADMIN — HEPARIN SODIUM 5000 UNITS: 5000 INJECTION INTRAVENOUS; SUBCUTANEOUS at 21:50

## 2022-09-26 RX ADMIN — POTASSIUM CHLORIDE 10 MEQ: 7.46 INJECTION, SOLUTION INTRAVENOUS at 16:57

## 2022-09-26 RX ADMIN — Medication 10 ML: at 09:00

## 2022-09-26 RX ADMIN — LOSARTAN POTASSIUM 50 MG: 50 TABLET, FILM COATED ORAL at 14:51

## 2022-09-26 RX ADMIN — CEFTRIAXONE SODIUM 1000 MG: 1 INJECTION, POWDER, FOR SOLUTION INTRAMUSCULAR; INTRAVENOUS at 05:37

## 2022-09-26 RX ADMIN — AZITHROMYCIN MONOHYDRATE 500 MG: 500 INJECTION, POWDER, LYOPHILIZED, FOR SOLUTION INTRAVENOUS at 06:16

## 2022-09-26 RX ADMIN — POTASSIUM CHLORIDE 40 MEQ: 1500 TABLET, EXTENDED RELEASE ORAL at 14:51

## 2022-09-26 RX ADMIN — MIRTAZAPINE 15 MG: 15 TABLET, FILM COATED ORAL at 21:50

## 2022-09-26 RX ADMIN — CLOPIDOGREL BISULFATE 75 MG: 75 TABLET ORAL at 14:56

## 2022-09-26 RX ADMIN — SODIUM CHLORIDE: 9 INJECTION, SOLUTION INTRAVENOUS at 03:43

## 2022-09-26 ASSESSMENT — ENCOUNTER SYMPTOMS
ABDOMINAL PAIN: 0
WHEEZING: 0
EYES NEGATIVE: 1
ALLERGIC/IMMUNOLOGIC NEGATIVE: 1
RHINORRHEA: 0
SHORTNESS OF BREATH: 0
SORE THROAT: 0
PHOTOPHOBIA: 0
BACK PAIN: 0
VOMITING: 0
NAUSEA: 0

## 2022-09-26 NOTE — PROGRESS NOTES
[facility] OCCUPATIONAL THERAPY EVALUATION - ACUTE     NAME: Geeta Martinez  : [birth year] (16 y.o.)  MRN: [id number]  CODE STATUS: Full Code  Room: A775/B667-11    Date of Service: 2022    Patient Diagnosis(es): Fever, unspecified fever cause [R50.9]  Altered mental status, unspecified altered mental status type [R41.82]  AMS (altered mental status) [R41.82]   Patient Active Problem List    Diagnosis Date Noted    AMS (altered mental status) 2022    Severe malnutrition ([address] 08/15/2022    DEION (acute kidney injury) ([address] 2022    Chronic renal disease, stage III ([address] [458598] 2022    Dementia with behavioral disturbance, unspecified dementia type ([address] 2022    Shortness of breath 10/22/2020    Tobacco abuse 10/22/2020    Postmenopausal bleeding 2020    COPD exacerbation ([address] 2020    Anxiety 2019    Cobalamin deficiency 10/25/2018    Chronic obstructive pulmonary disease ([address] 2016    History of disease 2016    Chronic obstructive lung disease ([address] 2016    Gastroesophageal reflux disease     Hypertensive disorder 2015    Cortical senile cataract 2014    Macular degeneration, age related, nonexudative 2014    Nuclear senile cataract 2014    Venous tributary (branch) occlusion of retina 2014        Past Medical History:   Diagnosis Date    DEION (acute kidney injury) ([address] 2022    Bowel disease     Cataract     Chronic back pain     Chronic renal disease, stage III [facility]) [588157] 2022    COPD (chronic obstructive pulmonary disease) ([address]     Dementia ([address]     Dementia with behavioral disturbance, unspecified dementia type ([address] 3/11/2022    Dizziness and giddiness     GERD (gastroesophageal reflux disease)     Glaucoma     Hypertension     Osteoarthritis     Pancreatitis     Seizures (HCC)     TIA (transient ischemic attack)      Past Surgical History:   Procedure Laterality Date    APPENDECTOMY      CHOLECYSTECTOMY INTESTINAL BYPASS      jejunoileal        Restrictions  Restrictions/Precautions: Fall Risk              Safety Devices: Safety Devices  Type of Devices: All fall risk precautions in place; Bed alarm in place;Call light within reach; Left in bed     Patient's date of birth confirmed: Yes    General:       Subjective   \"My daughter's on vacation. My grandson has been with me. \"       Pain at start of treatment: No    Pain at end of treatment: No      Prior Level of Function:  Social/Functional History  Lives With: Daughter (VERONIKA)  Type of Home: House  Bathroom Shower/Tub: Tub/Shower unit  Bathroom Equipment: Shower chair  Home Equipment:  (3 ww)  ADL Assistance: Independent  Homemaking Assistance: Needs assistance (family completes)  Ambulation Assistance: Independent ([employer])  Transfer Assistance: Independent  Additional Comments: pt states she washes at sink IND'ly however pt presents as questionable historian    OBJECTIVE:     Orientation Status:  Orientation  Overall Orientation Status: Impaired  Orientation Level: Oriented to person;Oriented to place    Observation:  Observation/Palpation  Observation: no acute distress noted on RA. IV noted. pt pleasantly confused    Cognition Status:  Cognition  Overall Cognitive Status: Exceptions  Arousal/Alertness: Appropriate responses to stimuli  Following Commands: Follows one step commands with repetition; Follows one step commands with increased time  Attention Span: Attends with cues to redirect  Memory: Decreased recall of biographical Information;Decreased recall of precautions;Decreased recall of recent events;Decreased long term memory;Decreased short term memory  Safety Judgement: Decreased awareness of need for assistance;Decreased awareness of need for safety  Problem Solving: Assistance required to correct errors made;Assistance required to identify errors made;Assistance required to generate solutions;Assistance required to implement solutions  Insights: Decreased awareness of deficits  Initiation: Requires cues for some  Sequencing: Requires cues for some  Cognition Comment: questionalble historian    Perception Status:       Vision and Hearing Status:  Vision  Vision Exceptions: Wears glasses for reading  Hearing  Hearing: Exceptions to [facility]        GROSS ASSESSMENT AROM/PROM:  AROM: Within functional limits       ROM:   LUE AROM (degrees)  LUE AROM : WFL  Left Hand AROM (degrees)  Left Hand AROM: WFL  RUE AROM (degrees)  RUE AROM : WFL  Right Hand AROM (degrees)  Right Hand AROM: WFL    UE STRENGTH:  Strength: Generally decreased, functional    UE COORDINATION:  Coordination: Within functional limits    UE TONE:  Tone: Normal    UE SENSATION:  Sensation: Intact    Hand Dominance:  Hand Dominance  Hand Dominance: Right    ADL Status:  ADL  Feeding: Stand by assistance  Grooming: Supervision  UE Bathing: Minimal assistance  LE Bathing: Maximum assistance  UE Dressing: Minimal assistance  LE Dressing: Maximum assistance  Toileting: Maximum assistance  Additional Comments: simulated ADL seated EOB. levels as anticipated; Pt posterior LOB requiring Zita x2 to correct. Pt demonstrated similar LOB with with all movements. Toilet Transfers  Toilet Transfer: Moderate assistance  Toilet Transfers Comments: anticipated level    Functional Mobility:    Transfers  Sit to stand: Moderate assistance  Stand to sit: Moderate assistance    Patient ambulated to/from sink with [employer] at [facility]. Pt required assist to navigate RW and frequent cues for safety.      Bed Mobility  Bed mobility  Supine to Sit: Minimal assistance  Sit to Supine: Stand by assistance  Bed Mobility Comments: verbal cues to complete    Seated and Standing Balance:  Balance  Sitting: Impaired (minAx2)  Standing: Impaired (ModA)    Functional Endurance:  Activity Tolerance  Activity Tolerance: Patient limited by fatigue;Treatment limited secondary to decreased cognition    D/C Recommendations:  OT D/C RECOMMENDATIONS  REQUIRES OT FOLLOW-UP: Yes    Equipment Recommendations:       OT Education:   Patient Education  Education Given To: Patient  Education Provided: Role of Therapy;Plan of Care    OT Follow Up:   OT D/C RECOMMENDATIONS  REQUIRES OT FOLLOW-UP: Yes       Assessment/Discharge Disposition:  Assessment: Pt is a 69 yr old female presenting to [facility] with the above functional deficits. Pt would benefit from occupational therapy services to maximizes safety and independence with ADL tasks, improve overall strength/endurance and balance for functional tasks.   Performance deficits / Impairments: Decreased functional mobility , Decreased safe awareness, Decreased balance, Decreased ADL status, Decreased endurance, Decreased strength, Decreased cognition, Decreased posture  Prognosis: Fair  Discharge Recommendations: Continue to assess pending progress  Decision Making: Medium Complexity  History: multiple  Exam: 8 perf deficits  Assistance / Modification: Rosie    [facility] Self care Score   How much help for putting on and taking off regular lower body clothing?: A Lot  How much help for Bathing?: A Lot  How much help for Toileting?: A Lot  How much help for putting on and taking off regular upper body clothing?: A Little  How much help for taking care of personal grooming?: A Little  How much help for eating meals?: A Little  AM-[facility] Inpatient Daily Activity Raw Score: 15  AM-PAC Inpatient ADL T-Scale Score : 34.69  ADL Inpatient CMS 0-100% Score: 56.46    Therapy key for assistance levels -   Independent/Mod I = Pt. is able to perform task with no assistance but may require a device   Stand by assistance = Pt. does not perform task at an independent level but does not need physical assistance, requires verbal cues  Minimal, Moderate, Maximal Assistance = Pt. requires physical assistance (25%, 50%, 75% assist from helper) for task but is able to actively participate in task   Dependent = Pt. requires total assistance with task and is not able to actively participate with task completion     Plan:  Plan  Times per Week: 1-4x/wk  Plan Weeks: length of acute stay  Current Treatment Recommendations: Strengthening, Balance training, Functional mobility training, Endurance training, Safety education & training, Patient/Caregiver education & training, Equipment evaluation, education, & procurement, Self-Care / ADL    Goals:   Patient will:    - Improve functional endurance to tolerate/complete 30 mins of ADL's  - Be SUP in UB ADLs   - Be SUP in LB ADLs  - Be SUP in ADL transfers without LOB  - Be SUP in toileting tasks  - Improve B UE strength and endurance to 4/5 in order to participate in self-care activities as projected. - Access appropriate D/C site with as few architectural barriers as possible.     Patient Goal: Patient goals : to return home with daughter     Discussed and agreed upon: Yes Comments:       Therapy Time:   Individual   Time In 1050   Time Out 1109   Minutes 19          Eval: 19 minutes     Electronically signed by:    Scar Bardales OT,   9/26/2022, 11:42 AM

## 2022-09-26 NOTE — FLOWSHEET NOTE
Contacted by lab w/a critical potasium level of 2.6. Contacted Dr. Justino Lennox via perfect serve.

## 2022-09-26 NOTE — CARE COORDINATION
09/26/22    From:Home with daughter, walker    Admit: AMS     PMH:Dementia, COPD, HTN, Seizures, TIA    Anticipated Discharge Disposition:Home     Patient Mobility or PT/OT ordered:Ordered   Consults: Hallie Hutson    Clinical: K 2.6 BUN 58/2.73  Trop 0.014   Hgb 11.6    Barriers to Discharge:BLD and Urine Cultures    Assessments: CMI Done.

## 2022-09-26 NOTE — PROGRESS NOTES
Hospitalist Progress Note      PCP: Bbaatunde Leblanc MD    Date of Admission: 9/25/2022    Chief Complaint:    Chief Complaint   Patient presents with    Illness     Times four days pt not eating and drinking         Subjective:  Patient denies fevers, chills, sweats, CP, SOB, diarrhea or burning micturition. 12 point ROS negative other than mentioned above     Medications:  Reviewed    Infusion Medications    sodium chloride Stopped (09/26/22 0616)    sodium chloride       Scheduled Medications    sodium chloride flush  5-40 mL IntraVENous 2 times per day    heparin (porcine)  5,000 Units SubCUTAneous BID    cefTRIAXone (ROCEPHIN) IV  1,000 mg IntraVENous Q24H    azithromycin  500 mg IntraVENous Q24H    potassium chloride  10 mEq IntraVENous Q1H    clopidogrel  75 mg Oral Daily    lipase-protease-amylase  24,000 Units Oral Daily    losartan  50 mg Oral Daily    mirtazapine  15 mg Oral Nightly    propranolol  40 mg Oral BID     PRN Meds: sodium chloride flush, sodium chloride, ondansetron **OR** ondansetron, polyethylene glycol, acetaminophen **OR** acetaminophen, magnesium sulfate      Intake/Output Summary (Last 24 hours) at 9/26/2022 1458  Last data filed at 9/26/2022 0648  Gross per 24 hour   Intake 2782. 56 ml   Output --   Net 2782. 56 ml     Exam:    BP (!) 155/89   Pulse 78   Temp 97.7 °F (36.5 °C) (Axillary)   Resp 18   Ht 5' 4\" (1.626 m)   Wt 81 lb (36.7 kg)   LMP  (LMP Unknown)   SpO2 100%   BMI 13.90 kg/m²     General appearance: No apparent distress, appears stated age and cooperative. HEENT:  Conjunctivae/corneas clear. Neck: Trachea midline. Respiratory:  Normal respiratory effort. Clear to auscultation  Cardiovascular: Regular rate and rhythm  Abdomen: Soft, non-tender, non-distended with normal bowel sounds.   Musculoskeletal: No clubbing, cyanosis or edema bilaterally  Neuro: Non Focal.   Capillary Refill: Brisk,< 3 seconds   Peripheral Pulses: +2 palpable, equal bilaterally     Labs:   Recent Labs     09/25/22 1915 09/26/22 0453   WBC 7.4 5.9   HGB 12.8 11.6*   HCT 38.1 35.4*    154     Recent Labs     09/25/22 1915 09/26/22 0453    139   K 3.6 2.6*   * 110*   CO2 14* 12*   BUN 62* 58*   CREATININE 2.90* 2.73*   CALCIUM 9.1 8.4*     Recent Labs     09/25/22 1915 09/26/22 0453   AST 21 20   ALT 13 11   BILITOT 0.3 0.3   ALKPHOS 77 71     Recent Labs     09/25/22 2245   INR 1.0     Recent Labs     09/25/22 2245 09/25/22  2330 09/26/22 0453 09/26/22  1127   CKTOTAL 40  --   --   --    TROPONINI  --  0.019* 0.014* 0.013*       Urinalysis:      Lab Results   Component Value Date/Time    NITRU Negative 09/25/2022 11:38 PM    WBCUA 20-50 09/25/2022 11:38 PM    BACTERIA FEW 09/25/2022 11:38 PM    RBCUA 3-5 09/06/2022 02:37 PM    BLOODU Negative 09/25/2022 11:38 PM    SPECGRAV 1.020 09/25/2022 11:38 PM    GLUCOSEU Negative 09/25/2022 11:38 PM       Radiology:  XR CHEST PORTABLE   Final Result   No acute process. Assessment/Plan:    #AMS    - improved; possibly due to UTI    #? PNA    - no clear infiltrate on CXR    #Elevated troponinin     - likely due to CKD    #DEION on CKD    - nephro conslt    #HTN/Dementia/TIA hx/ GERD/COPD    - continue home meds    Active Hospital Problems    Diagnosis Date Noted    AMS (altered mental status) [R41.82] 09/26/2022     Priority: Medium      Additional work up or/and treatment plan may be added today or then after based on clinical progression. I am managing a portion of pt care. Some medical issues are handled by other specialists. Additional work up and treatment should be done in out pt setting by pt PCP and other out pt providers. In addition to examining and evaluating pt, I spent additional time explaining care, normal and abnormal findings, and treatment plan. All of pt questions were answered. Counseling, diet and education were  provided. Case will be discussed with nursing staff when appropriate.  Family will be updated if and when appropriate. Diet: ADULT DIET;  Regular  ADULT ORAL NUTRITION SUPPLEMENT; Breakfast, Lunch, Dinner; Standard High Calorie/High Protein Oral Supplement    Code Status: Full Code    PT/OT Eval     Electronically signed by Fransisco Fleming MD on 9/26/2022 at 2:58 PM

## 2022-09-26 NOTE — ED PROVIDER NOTES
Eileen Chang  eMERGENCY dEPARTMENT eNCOUnter      Pt Name: Daniel Elizalde  MRN: [id number]  Davidgflynette 1945  Date of evaluation: 9/25/2022  Provider: Bianca Carpenter MD      HISTORY OF PRESENT ILLNESS      Chief Complaint   Patient presents with    Illness     Times four days pt not eating and drinking         The history is provided by the Patient and Son. Daniel Elizalde is a 69 y.o. female with a PMH clinically significant for Dementia, HTN, CKD, COPD, GERD, Hyperparathyroidism presenting to the ED via EMS c/o AMS, generalized weakness/fatigue and Poor PO intake over the past 3-4 days. Son states that the patient has hardly ate anything over the past 4 days, largely just sitting. Also very confused, commonly forgetting where she is when she is at home. States that this is new for her, does usually know where she is at home. Does have a history of dementia, but has been much more confused than usual.  Thought that it might be due to an infection. States that the patient has not had any recent falls. Denies any new or worsening cough. States she has had urinary tract infections in the past.  Has also had electrolyte abnormalities causing her to become confused. Did not know that the patient had a fever prior to arrival.  Unsure regarding exposure to new sick contacts. Son also concerned that she is taking her medications inappropriately. Usually able to take her meds regularly after they have been put out for her, but she has been taking the nighttime instead of the daytime sometimes. Patient denies any pain at this time beyond lower back pain. Son states that she has had this in the past however. Denies any chest pain, shortness of breath, abdominal pain. Patient is a poor historian, dementia. Per Chart Review: Most recent admission for paresthesias, found to have hypomagnesemia and DEION on CKD.     REVIEW OF SYSTEMS       Review of Systems   Unable to perform ROS: Mental status change PAST MEDICAL HISTORY     Past Medical History:   Diagnosis Date    DEION (acute kidney injury) ([facility] 2022    Bowel disease     Cataract     Chronic back pain     Chronic renal disease, stage III [facility]) [[id number]] 2022    COPD (chronic obstructive pulmonary disease) (HCC)     Dementia (HCC)     Dementia with behavioral disturbance, unspecified dementia type ([facility] 3/11/2022    Dizziness and giddiness     GERD (gastroesophageal reflux disease)     Glaucoma     Hypertension     Osteoarthritis     Pancreatitis     Seizures (HCC)     TIA (transient ischemic attack)        SURGICAL HISTORY       Past Surgical History:   Procedure Laterality Date    APPENDECTOMY      CHOLECYSTECTOMY      INTESTINAL BYPASS      jejunoileal       FAMILY HISTORY       Family History   Problem Relation Age of Onset    Arthritis Other     Heart Disease Other     High Blood Pressure Other     Kidney Disease Other     Other Other         respiratory problems    Breast Cancer Neg Hx     Colon Cancer Neg Hx     Diabetes Neg Hx     Cancer Neg Hx     Eclampsia Neg Hx     Ovarian Cancer Neg Hx     Hypertension Neg Hx      Labor Neg Hx     Spont Abortions Neg Hx     Stroke Neg Hx        SOCIAL HISTORY       Social History     Socioeconomic History    Marital status:      Spouse name: None    Number of children: None    Years of education: None    Highest education level: None   Tobacco Use    Smoking status: Every Day     Packs/day: 3.00     Years: 54.00     Pack years: 162.00     Types: Cigarettes    Smokeless tobacco: Never   Substance and Sexual Activity    Alcohol use: No     Alcohol/week: 0.0 standard drinks    Drug use: No    Sexual activity: Not Currently     Partners: Male     Comment:      Social Determinants of Health     Financial Resource Strain: Low Risk     Difficulty of Paying Living Expenses: Not hard at all   Food Insecurity: No Food Insecurity    Worried About Running Out of Food in the Last Year: Never true    Ran Out of Food in the Last Year: Never true   Transportation Needs: No Transportation Needs    Lack of Transportation (Medical): No    Lack of Transportation (Non-Medical): No       CURRENT MEDICATIONS       Current Discharge Medication List        CONTINUE these medications which have NOT CHANGED    Details   famotidine (PEPCID) 20 MG tablet Take 20 mg by mouth at bedtime      diphenoxylate-atropine (DIPHENATOL) 2.5-0.025 MG per tablet Take 1 tablet by mouth 4 times daily as needed for Diarrhea for up to 10 days.   Qty: 40 tablet, Refills: 0    Associated Diagnoses: Diarrhea, unspecified type      CREON 36226-56165 units delayed release capsule TAKE 1 CAPSULE TWICE A DAY  Qty: 180 capsule, Refills: 3    Associated Diagnoses: Pancreatic insufficiency      clopidogrel (PLAVIX) 75 MG tablet TAKE 1 TABLET DAILY  Qty: 90 tablet, Refills: 3    Associated Diagnoses: Medication refill; Transient cerebral ischemia, unspecified type      pantoprazole (PROTONIX) 40 MG tablet TAKE 1 TABLET DAILY  Qty: 90 tablet, Refills: 3    Associated Diagnoses: Medication refill; Gastroesophageal reflux disease, unspecified whether esophagitis present      propranolol (INDERAL) 40 MG tablet TAKE 1 TABLET TWICE A DAY (REPLACES PREVIOUS PRESCRIPTION)  Qty: 180 tablet, Refills: 3    Associated Diagnoses: Medication refill; Essential hypertension      mirtazapine (REMERON) 15 MG tablet TAKE 1 TABLET EVERY NIGHT  Qty: 90 tablet, Refills: 3    Associated Diagnoses: Depression, unspecified depression type      clonazePAM (KLONOPIN) 0.5 MG tablet TAKE 1 TABLET BY MOUTH EVERY NIGHT AS NEEDED FOR ANXIETY  Qty: 30 tablet, Refills: 2    Associated Diagnoses: Anxiety      Magnesium Oxide 400 MG CAPS Take 400 mg by mouth 3 times daily  Qty: 270 capsule, Refills: 3    Associated Diagnoses: Hypomagnesemia      albuterol sulfate HFA (VENTOLIN HFA) 108 (90 Base) MCG/ACT inhaler USE 2 INHALATIONS EVERY 6 HOURS AS NEEDED FOR WHEEZING  Qty: 54 g, Refills: 2    Associated Diagnoses: Chronic obstructive pulmonary disease, unspecified COPD type ([address]      tiotropium-olodaterol (STIOLTO) 2.5-2.5 MCG/ACT AERS Inhale 2 puffs into the lungs daily  Qty: 3 each, Refills: 3    Associated Diagnoses: Chronic obstructive pulmonary disease, unspecified COPD type (HCC)      Multiple Vitamins-Minerals (MULTIVITAMIN WOMEN 50+) TABS TAKE 1 TABLET BY MOUTH EVERY DAY  Qty: 90 tablet, Refills: 1      metroNIDAZOLE (FLAGYL) 500 MG tablet TAKE 1 TABLET THREE TIMES A DAY FOR 10 DAYS PER MONTH  Qty: 90 tablet, Refills: 3    Associated Diagnoses: Intestinal bypass or anastomosis status      losartan (COZAAR) 50 MG tablet TAKE 1 TABLET DAILY  Qty: 90 tablet, Refills: 3      clotrimazole-betamethasone (LOTRISONE) 1-0.05 % cream APPLY TOPICALLY TWICE A DAY  Qty: 45 g, Refills: 14    Associated Diagnoses: Eczema, unspecified type             ALLERGIES     Aspirin, Penicillins, and Adhesive tape      PHYSICAL EXAM       ED Triage Vitals [09/25/22 1907]   BP Temp Temp Source Heart Rate Resp SpO2 Height Weight   (!) 142/90 (!) 101.5 °F (38.6 °C) Temporal (!) 103 18 96 % 5' 4\" (1.626 m) 81 lb (36.7 kg)       Physical Exam  Vitals and nursing note reviewed. Constitutional:       General: She is awake. She is not in acute distress. Appearance: She is cachectic. She is ill-appearing. HENT:      Head: Normocephalic and atraumatic. Mouth/Throat:      Mouth: Mucous membranes are dry. Pharynx: Oropharynx is clear. Eyes:      Extraocular Movements: Extraocular movements intact. Pupils: Pupils are equal, round, and reactive to light. Cardiovascular:      Rate and Rhythm: Normal rate and regular rhythm. Pulses: Normal pulses. Pulmonary:      Effort: Pulmonary effort is normal. No respiratory distress. Comments: Intermittent coughing with transmitted upper airway sounds  Chest:      Chest wall: No tenderness. Abdominal:      Palpations: Abdomen is soft. Tenderness: There is no abdominal tenderness. There is no guarding or rebound. Musculoskeletal:         General: No swelling, tenderness or signs of injury. Cervical back: Normal range of motion and neck supple. No tenderness. Right lower leg: No edema. Left lower leg: No edema. Skin:     General: Skin is warm and dry. Capillary Refill: Capillary refill takes less than 2 seconds. Neurological:      Mental Status: She is alert. She is disoriented and confused. Sensory: No sensory deficit. Motor: Weakness (generalized, symmetric) present. Psychiatric:         Behavior: Behavior is cooperative.        MDM:   Chart Reviewed: PMH and additional information as noted in HPI obtained from chart review    Vitals:    Vitals:    09/26/22 0033 09/26/22 0135 09/26/22 0200 09/26/22 0729   BP: (!) 163/82 (!) 150/83 (!) 162/86 (!) 160/91   Pulse: 79 80 86 73   Resp: 21 15 14 18   Temp:   98 °F (36.7 °C) 97.5 °F (36.4 °C)   TempSrc:   Axillary Oral   SpO2: 98% 100% 100% 100%   Weight:       Height:           PROCEDURES:  Unless otherwise noted below, none  Procedures    LABS:  Labs Reviewed   COMPREHENSIVE METABOLIC PANEL - Abnormal; Notable for the following components:       Result Value    Chloride 110 (*)     CO2 14 (*)     Anion Gap 17 (*)     BUN 62 (*)     Creatinine 2.90 (*)     GFR Non- 15.8 (*)     GFR  19.1 (*)     All other components within normal limits   CBC WITH AUTO DIFFERENTIAL - Abnormal; Notable for the following components:    RBC 3.61 (*)     .6 (*)     MCH 35.5 (*)     RDW 14.6 (*)     Monocytes Absolute 0.1 (*)     All other components within normal limits   URINALYSIS WITH REFLEX TO CULTURE - Abnormal; Notable for the following components:    Ketones, Urine TRACE (*)     Protein,  (*)     Leukocyte Esterase, Urine SMALL (*)     All other components within normal limits   TSH WITH REFLEX - Abnormal; Notable for the following components:    TSH 5.300 (*)     All other components within normal limits   PROCALCITONIN - Abnormal; Notable for the following components:    Procalcitonin 0.16 (*)     All other components within normal limits   TROPONIN - Abnormal; Notable for the following components:    Troponin 0.019 (*)     All other components within normal limits    Narrative:     Beverly Long  [facility] tel. [phone],  trop results called to and read back by Dr. Kevin Dao, 09/26/2022 02:00, by  [address] [place] [address] - Abnormal; Notable for the following components:    Bacteria, UA FEW (*)     WBC, UA 20-50 (*)     All other components within normal limits   CBC WITH AUTO DIFFERENTIAL - Abnormal; Notable for the following components:    RBC 3.30 (*)     Hemoglobin 11.6 (*)     Hematocrit 35.4 (*)     .1 (*)     MCH 35.0 (*)     MCHC 32.7 (*)     RDW 14.7 (*)     Lymphocytes Absolute 0.8 (*)     Bands Relative 2 (*)     All other components within normal limits   COMPREHENSIVE METABOLIC PANEL - Abnormal; Notable for the following components:    Potassium 2.6 (*)     Chloride 110 (*)     CO2 12 (*)     Anion Gap 17 (*)     BUN 58 (*)     Creatinine 2.73 (*)     GFR Non- 16.9 (*)     GFR  20.4 (*)     Calcium 8.4 (*)     Total Protein 6.2 (*)     All other components within normal limits    Narrative:     Katarina Perez tel. [phone],  Chemistry results called to and read back by seema hand, 09/26/2022 08:35, by  Yoel Osuna results called to and read back by [facility] NIKHIL, 09/26/2022 06:18, by [facility]   TROPONIN - Abnormal; Notable for the following components:    Troponin 0.014 (*)     All other components within normal limits    Narrative:     Beverly Long  [facility] tel. [phone],  TROP results called to and read back by [facility] NIKHIL, 09/26/2022 06:18, by [facility]   COVID-19, RAPID   RESPIRATORY PANEL, MOLECULAR, WITH COVID-19   CULTURE, BLOOD 1   CULTURE, BLOOD 2   CULTURE, URINE   LACTIC ACID   MAGNESIUM CK   APTT   PROTIME-INR   BRAIN NATRIURETIC PEPTIDE    Narrative:     Fanny Schaefer tel. [phone],  trop results called to and read back by Dr. Niko Rey, 09/26/2022 02:00, by  [facility]   T4, FREE   MAGNESIUM    Narrative:     Cami Corral tel. [phone],  Chemistry results called to and read back by seema hand, 09/26/2022 08:35, by  [facility] results called to and read back by [facility] NIKHIL, 09/26/2022 06:18, by [facility]   TROPONIN   VITAMIN B12 & FOLATE       XR CHEST PORTABLE   Final Result   No acute process. ED Course as of 09/26/22 0926   Sun Sep 25, 2022   2324 Anion Gap(!): 17  Thought likely 2/2 starvation ketosis [NA]   2324 Creatinine(!): 2.90 [NA]   2324 BUN,BUNPL(!): 62 [NA]   2324 WBC: 7.4 [NA]   2325 EKG 12 Lead  EKG showing normal sinus rhythm, rate of 86 bpm.  Normal axis, normal intervals. Nonspecific ST T wave abnormalities. [NA]   Mon Sep 26, 2022   0023 SARS-CoV-2, NAAT: Not Detected [NA]   0023 Total CK: 40 [NA]   0023 INR: 1.0 [NA]   0023 aPTT: 30.5 [NA]   0023 Magnesium: 2.2 [NA]   0023 Lactic Acid: 1.0 [NA]   0023 TSH(!): 5.300 [NA]   0034 Leukocyte Esterase, Urine(!): SMALL [NA]      ED Course User Index  [NA] Kiel Lloyd MD       69 y.o. female with a PMH clinically significant for Dementia, HTN, CKD, COPD, GERD, Hyperparathyroidism presenting to the ED via EMS c/o AMS, generalized weakness/fatigue and Poor PO intake over the past 3-4 days. Upon initial evaluation, Pt febrile and generally ill-appearing, but otherwise HDS and in NAD. PE as noted above. Labs, , EKG,, and Imaging as noted above. Given findings, clinical presentation most likely consistent w/ altered mental status, generalized weakness and fatigue secondary to presumed UTI. Urinalysis still pending however. COVID testing negative, chest x-ray without evidence of focal consolidation concerning for bacterial pneumonia.   Given findings, will be admitted to medicine for further evaluation and management. Not yet initiated on antibiotics given lack of clear source. Medicine aware of need to follow-up with urinalysis. .   Pt was administered   Medications   0.9 % sodium chloride infusion ( IntraVENous Stopped 9/26/22 0616)   sodium chloride flush 0.9 % injection 5-40 mL (has no administration in time range)   sodium chloride flush 0.9 % injection 5-40 mL (has no administration in time range)   0.9 % sodium chloride infusion (has no administration in time range)   heparin (porcine) injection 5,000 Units (has no administration in time range)   ondansetron (ZOFRAN-ODT) disintegrating tablet 4 mg (has no administration in time range)     Or   ondansetron (ZOFRAN) injection 4 mg (has no administration in time range)   polyethylene glycol (GLYCOLAX) packet 17 g (has no administration in time range)   acetaminophen (TYLENOL) tablet 650 mg (has no administration in time range)     Or   acetaminophen (TYLENOL) suppository 650 mg (has no administration in time range)   cefTRIAXone (ROCEPHIN) 1,000 mg in dextrose 5 % 50 mL IVPB mini-bag (0 mg IntraVENous Stopped 9/26/22 0610)   azithromycin (ZITHROMAX) 500 mg in D5W 250ml addavial ( IntraVENous Rate/Dose Verify 9/26/22 0648)   0.9 % sodium chloride bolus (0 mLs IntraVENous Stopped 9/26/22 0114)   acetaminophen (TYLENOL) tablet 1,000 mg (1,000 mg Oral Given 9/25/22 2253)       Plan: Admit to IM for further evaluation and management. Report given to Dr. Francesca Bell and Patient understanding and amenable to the POC. CRITICAL CARE TIME   Total CriticalCare time was 0 minutes, excluding separately reportable procedures. There was a high probability of clinically significant/life threatening deterioration in the patient's condition which required my urgent intervention. FINAL IMPRESSION      1. Altered mental status, unspecified altered mental status type    2.  Fever, unspecified fever cause          DISPOSITION/PLAN   DISPOSITION Admitted 09/26/2022 01:42:17 AM      Current Discharge Medication List           MD Kallie Lamas MD  09/26/22 2027

## 2022-09-26 NOTE — PROGRESS NOTES
0230 - Assessment completed. Pt Ax0x2. Pt alert to self and place but not sure of year. NSR on tele. VSS at rest. Pt states that she lives at home with her daughter and son in law but they are out of town at the moment. Her other son brought her into the ER. Pt does not know her home medications and states that she uses multiple pharmacies to fill her medications. A mail away, [facility] and sometimes [facility]. This RN unable to call those places at this time. Home meds have been req'd per [facility] PCP visits over the past month but this RN will alert the dayshift RN to follow up with the pharmacies. Denies any pain or needs at this time. Pt is resting comfortably in bed. Call light in reach, bed alarm on, will note any deviations. 0600 - Pt resting quietly. IV antibiotics infusing without issue.      Electronically signed by Meredeth Bence, RN on 9/26/2022 at 6:18 AM

## 2022-09-26 NOTE — CARE COORDINATION
[facility] [place] Case Management Initial Discharge Assessment    Met with Patient to discuss discharge plan. PCP: Med Katz MD                                Date of Last Visit: 8/25/2022    [facility] Patient: No        [facility] Notified: no    If no PCP, list provided? N/A    Discharge Planning    Living Arrangements: at home dependent on family care    Who do you live with? Daughter, currently at grandsons home as daughter is out of town. Who helps you with your care:  self or family    If lives at home:     Do you have any barriers navigating in your home? no    Patient can perform ADL? Yes    Current Services (outpatient and in home) :  None    Dialysis: No    Is transportation available to get to your appointments? Yes    DME Equipment:  yes - [facility]    Respiratory equipment: None    Respiratory provider:  no     Pharmacy:  yes - [facility]     Consult with Medication Assistance Program?  No      Patient agreeable to [address]? Declined    Patient agreeable to SNF/Rehab? Declined    Other discharge needs identified? N/A    Does Patient Have a High-Risk for Readmission Diagnosis (CHF, PN, MI, COPD)? Yes, see care coordinator assessment    If Yes,    Consult with pulmonologist? N/A  Consult with cardiologist? N/A  Cardiac Rehab referral if EF <35%? No  Consult with Pharmacy for medication assessment prior to discharge? Yes  Consult with Behavioral health to aid in depression, anxiety, or coping issues? No  Palliative Care Consult? No  Pulmonary Rehab order for COPD, PN, and CHF (if EF > 35%)? No   Does patient have a reliable scale and know how to read it (for CHF)? N/A  Nutrition consult for CHF? N/A  Respiratory therapy consult that includes bedside instruction on administration of nebulizers and/or inhalers, and assessment of oxygen and equipment needs in the home? N/A    Initial Discharge Plan? (Note: please see concurrent daily documentation for any updates after initial note). Met with pt at bedside to discuss discharge planning. Pt states she moved in with her daughter after last discharge but is currently staying with her grandson as her daughter is in [place]. Daughter to return home 9/27 but unsure of time. Pt denies d/c needs.      Readmission Risk              Risk of Unplanned Readmission:  19         Electronically signed by Isaiah Berry RN on 9/26/2022 at 11:45 AM

## 2022-09-26 NOTE — PROGRESS NOTES
Physical Therapy Med Surg Initial Assessment  Facility/Department: [address] [place] [address]  Room: [place] [address]       NAME: Antione Martinez  : 1945 (89 y.o.)  MRN: [id number]  CODE STATUS: Full Code    Date of Service: 2022    Patient Diagnosis(es): Fever, unspecified fever cause [R50.9]  Altered mental status, unspecified altered mental status type [R41.82]  AMS (altered mental status) [R41.82]   Chief Complaint   Patient presents with    Illness     Times four days pt not eating and drinking       Patient Active Problem List    Diagnosis Date Noted    AMS (altered mental status) 2022    Severe malnutrition ([address] 08/15/2022    DEION (acute kidney injury) ([address] 2022    Chronic renal disease, stage III ([address] [221082] 2022    Dementia with behavioral disturbance, unspecified dementia type ([address] 2022    Shortness of breath 10/22/2020    Tobacco abuse 10/22/2020    Postmenopausal bleeding 2020    COPD exacerbation ([address] 2020    Anxiety 2019    Cobalamin deficiency 10/25/2018    Chronic obstructive pulmonary disease ([address] 2016    History of disease 2016    Chronic obstructive lung disease ([address] 2016    Gastroesophageal reflux disease     Hypertensive disorder 2015    Cortical senile cataract 2014    Macular degeneration, age related, nonexudative 2014    Nuclear senile cataract 2014    Venous tributary (branch) occlusion of retina 2014        Past Medical History:   Diagnosis Date    DEION (acute kidney injury) ([address] 2022    Bowel disease     Cataract     Chronic back pain     Chronic renal disease, stage III [facility]) [375296] 2022    COPD (chronic obstructive pulmonary disease) ([address]     Dementia ([address]     Dementia with behavioral disturbance, unspecified dementia type ([address] 3/11/2022    Dizziness and giddiness     GERD (gastroesophageal reflux disease)     Glaucoma     Hypertension     Osteoarthritis     Pancreatitis requires cues to complete tasks    Transfers  Sit to Stand: Minimal Assistance; Moderate Assistance  Stand to sit: Minimal Assistance; Moderate Assistance  Comment: pt demonstrated reduced balance reaction timeing and effectiveness    Ambulation  Surface: level tile  Device: Rolling Walker  Assistance:  Moderate assistance;Maximum assistance  Quality of Gait: short step length, poor walker management, poor problem solving of environment, knee stability fair, constant LOB requiring ww and assist for recovery  Distance: 15 feet  Comments: pt utilizes 3 ww normally                   Activity Tolerance  Activity Tolerance: Patient tolerated evaluation without incident    Patient Education  Education Given To: Patient  Education Provided: Role of Therapy;Plan of Care  Barriers to Learning: Cognition  Education Outcome: Verbalized understanding       ASSESSMENT:   Decision Making: Medium Complexity  History: high  Exam: med  Clinical Presentation: med    Barriers to Learning: cognition/dementia         DISCHARGE RECOMMENDATIONS:  Discharge Recommendations: Continue to assess pending progress       Requires PT Follow-Up: Yes      PLAN OF CARE:  Plan  Plan: 1 time a day 3-6 times a week  Current Treatment Recommendations: Strengthening, Balance training, Functional mobility training, Transfer training, Gait training, Neuromuscular re-education, Equipment evaluation, education, & procurement, Patient/Caregiver education & training, Safety education & training    Safety Devices  Type of Devices: Bed alarm in place, Call light within reach, Left in bed    Goals:  Short Term Goals  Short term goal 1: SBA bed mobility  Short term goal 2: SBA sit to stand  Short term goal 3: min assist for gait with 3 ww 25 feet  Short term goal 4: pt able to stand with bilat UE support with min assist for 2 min    AMPAC (6 CLICK) BASIC MOBILITY  AM-PAC Inpatient Mobility Raw Score : 13     Therapy Time:   Individual   Time In 1050   Time Out [address], 1688 [facility], 09/26/22 at 11:21 AM         Definitions for assistance levels  Independent = pt does not require any physical supervision or assistance from another person for activity completion. Device may be needed.   Stand by assistance = pt requires verbal cues or instructions from another person, close to but not touching, to perform the activity  Minimal assistance= pt performs 75% or more of the activity; assistance is required to complete the activity  Moderate assistance= pt performs 50% of the activity; assistance is required to complete the activity  Maximal assistance = pt performs 25% of the activity; assistance is required to complete the activity  Dependent = pt requires total physical assistance to accomplish the task

## 2022-09-26 NOTE — H&P
[facility]  MEDICINE    HISTORY AND PHYSICAL EXAM    PATIENT NAME:  Everardo Vargas    MRN:  [id number]  SERVICE DATE:  9/26/2022   SERVICE TIME:  1:58 AM    Primary Care Physician: Robbi Del Rio MD         SUBJECTIVE  CHIEF COMPLAINT:  Illness (Times four days pt not eating and drinking/)       HPI: Patient being admitted for fever and poor oral intake. Patient is a pleasant alert and oriented x3 64-year-old  female. Patient was brought into the ED by EMS because family was concerned that she has not been eating for the past 4 days. They also state that she is just more fatigued and confused. Patient does have a history of dementia. However, patient is able to tell me about her current wellbeing. Patient states that she feels fine with no shortness of breath or pain. Specifically patient denies any chest pain or abdominal pain. Patient denies any cough, shortness of breath, nausea, vomiting, diarrhea, or dysuria. Patient states that she did not have anything to eat today and states it was because she did not feel like it. Asked if she ate in the last couple days and she states that she does not know and she does not think she had much to eat. Patient denies any known fever. Patient's other past medical history includes CKD, dementia, HTN, COPD, and TIA.     PAST MEDICAL HISTORY:    Past Medical History:   Diagnosis Date    DEION (acute kidney injury) ([address] 8/13/2022    Bowel disease     Cataract     Chronic back pain     Chronic renal disease, stage III [facility]) [[id number]] 5/13/2022    COPD (chronic obstructive pulmonary disease) (HCC)     Dementia (HCC)     Dementia with behavioral disturbance, unspecified dementia type ([address] 3/11/2022    Dizziness and giddiness     GERD (gastroesophageal reflux disease)     Glaucoma     Hypertension     Osteoarthritis     Pancreatitis     Seizures (HCC)     TIA (transient ischemic attack)      PAST SURGICAL HISTORY:    Past Surgical History:   Procedure 2.5-0.025 MG per tablet Take 1 tablet by mouth 4 times daily as needed for Diarrhea for up to 10 days. 9/20/22 9/30/22  Jennifer Bailon MD   CREON [id number] units delayed release capsule TAKE 1 CAPSULE TWICE A DAY 9/14/22   Jennifer Bailon MD   clopidogrel (PLAVIX) 75 MG tablet TAKE 1 TABLET DAILY 9/14/22   Jennifer Bailon MD   pantoprazole (PROTONIX) 40 MG tablet TAKE 1 TABLET DAILY 9/14/22   Jennifer Bailon MD   propranolol (INDERAL) 40 MG tablet TAKE 1 TABLET TWICE A DAY (REPLACES PREVIOUS PRESCRIPTION) 9/12/22   Jennifer Bailon MD   mirtazapine (REMERON) 15 MG tablet TAKE 1 TABLET EVERY NIGHT 8/25/22   Jennifer Bailon MD   clonazePAM (KLONOPIN) 0.5 MG tablet TAKE 1 TABLET BY MOUTH EVERY NIGHT AS NEEDED FOR ANXIETY 8/25/22 11/22/22  Jennifer Bailon MD   Magnesium Oxide 400 MG CAPS Take 400 mg by mouth 3 times daily 8/4/22   Jennifer Bailon MD   albuterol sulfate HFA (VENTOLIN HFA) 108 (90 Base) MCG/ACT inhaler USE 2 INHALATIONS EVERY 6 HOURS AS NEEDED FOR WHEEZING 7/1/22   Gorge Kong MD   tiotropium-olodaterol (STIOLTO) 2.5-2.5 MCG/ACT AERS Inhale 2 puffs into the lungs daily 7/1/22   Gorge Kong MD   Multiple Vitamins-Minerals (MULTIVITAMIN WOMEN 50+) TABS TAKE 1 TABLET BY MOUTH EVERY DAY 5/4/22   Jennifer Bailon MD   metroNIDAZOLE (FLAGYL) 500 MG tablet TAKE 1 TABLET THREE TIMES A DAY FOR 10 DAYS PER MONTH 4/26/22   Jennifer Bailon MD   losartan (COZAAR) 50 MG tablet TAKE 1 TABLET DAILY 4/22/22   Jennifer Bailon MD   clotrimazole-betamethasone (LOTRISONE) 1-0.05 % cream APPLY TOPICALLY TWICE A DAY 5/24/21   Jennifer Bailon MD   Multiple Vitamins-Minerals (THERAPEUTIC MULTIVITAMIN-MINERALS) tablet Take 1 tablet by mouth daily 5/8/20 5/8/21  Jennifer Bailon MD       ALLERGIES: Aspirin, Penicillins, and Adhesive tape    REVIEW OF SYSTEM:   Review of Systems   Constitutional:  Positive for appetite change. Negative for fatigue, fever and unexpected weight change. HENT:  Negative for congestion, rhinorrhea and sore throat.     Eyes: Negative. Negative for photophobia and visual disturbance. Respiratory:  Negative for shortness of breath and wheezing. Cardiovascular:  Negative for chest pain. Gastrointestinal:  Negative for abdominal pain, nausea and vomiting. Endocrine: Negative. Negative for polydipsia, polyphagia and polyuria. Genitourinary:  Negative for difficulty urinating, dysuria and pelvic pain. Musculoskeletal:  Negative for back pain. Skin: Negative. Negative for rash. Allergic/Immunologic: Negative. Neurological: Negative. Negative for dizziness, speech difficulty and weakness. Hematological: Negative. Psychiatric/Behavioral: Negative. Negative for behavioral problems and confusion. OBJECTIVE  PHYSICAL EXAM: BP (!) 163/82   Pulse 79   Temp 97.3 °F (36.3 °C) (Axillary)   Resp 21   Ht 5' 4\" (1.626 m)   Wt 81 lb (36.7 kg)   LMP  (LMP Unknown)   SpO2 98%   BMI 13.90 kg/m²     Physical Exam  Vitals and nursing note reviewed. Constitutional:       General: She is not in acute distress. Appearance: She is underweight. She is not ill-appearing or toxic-appearing. HENT:      Head: Normocephalic and atraumatic. Nose: Nose normal.      Mouth/Throat:      Mouth: Mucous membranes are dry. Eyes:      Pupils: Pupils are equal, round, and reactive to light. Cardiovascular:      Rate and Rhythm: Normal rate and regular rhythm. Pulses: Normal pulses. Heart sounds: Normal heart sounds. Pulmonary:      Effort: Pulmonary effort is normal. No respiratory distress. Breath sounds: Rhonchi present. No wheezing or rales. Abdominal:      General: Bowel sounds are normal. There is no distension. Palpations: Abdomen is soft. There is no mass. Tenderness: There is no abdominal tenderness. There is no guarding or rebound. Hernia: No hernia is present. Musculoskeletal:         General: Normal range of motion. Cervical back: Normal range of motion.       Right lower leg: No edema. Left lower leg: No edema. Skin:     General: Skin is warm and dry. Capillary Refill: Capillary refill takes less than 2 seconds. Neurological:      Mental Status: She is alert and oriented to person, place, and time. Psychiatric:         Mood and Affect: Mood normal.       DATA:     Diagnostic tests reviewed for today's visit:    Most recent labs and imaging results reviewed.      LABS:    Recent Results (from the past 24 hour(s))   Comprehensive Metabolic Panel    Collection Time: 09/25/22  7:15 PM   Result Value Ref Range    Sodium 141 135 - 144 mEq/L    Potassium 3.6 3.4 - 4.9 mEq/L    Chloride 110 (H) 95 - 107 mEq/L    CO2 14 (L) 20 - 31 mEq/L    Anion Gap 17 (H) 9 - 15 mEq/L    Glucose 98 70 - 99 mg/dL    BUN 62 (H) 8 - 23 mg/dL    Creatinine 2.90 (H) 0.50 - 0.90 mg/dL    GFR Non-African American 15.8 (L) >60    GFR  19.1 (L) >60    Calcium 9.1 8.5 - 9.9 mg/dL    Total Protein 7.0 6.3 - 8.0 g/dL    Albumin 4.2 3.5 - 4.6 g/dL    Total Bilirubin 0.3 0.2 - 0.7 mg/dL    Alkaline Phosphatase 77 40 - 130 U/L    ALT 13 0 - 33 U/L    AST 21 0 - 35 U/L    Globulin 2.8 2.3 - 3.5 g/dL   CBC with Auto Differential    Collection Time: 09/25/22  7:15 PM   Result Value Ref Range    WBC 7.4 4.8 - 10.8 K/uL    RBC 3.61 (L) 4.20 - 5.40 M/uL    Hemoglobin 12.8 12.0 - 16.0 g/dL    Hematocrit 38.1 37.0 - 47.0 %    .6 (H) 82.0 - 100.0 fL    MCH 35.5 (H) 27.0 - 31.3 pg    MCHC 33.6 33.0 - 37.0 %    RDW 14.6 (H) 11.5 - 14.5 %    Platelets 977 007 - 834 K/uL    PLATELET SLIDE REVIEW Normal     Neutrophils % 83.0 %    Lymphocytes % 14.0 %    Monocytes % 1.9 %    Eosinophils % 2.0 %    Basophils % 0.5 %    Neutrophils Absolute 6.1 1.4 - 6.5 K/uL    Lymphocytes Absolute 1.0 1.0 - 4.8 K/uL    Monocytes Absolute 0.1 (L) 0.2 - 0.8 K/uL    Eosinophils Absolute 0.1 0.0 - 0.7 K/uL    Basophils Absolute 0.0 0.0 - 0.2 K/uL    Macrocytes 1+    Lactic Acid    Collection Time: 09/25/22 10:45 PM Result Value Ref Range    Lactic Acid 1.0 0.5 - 2.2 mmol/L   Magnesium    Collection Time: 09/25/22 10:45 PM   Result Value Ref Range    Magnesium 2.2 1.7 - 2.4 mg/dL   CK    Collection Time: 09/25/22 10:45 PM   Result Value Ref Range    Total CK 40 0 - 170 U/L   TSH with Reflex    Collection Time: 09/25/22 10:45 PM   Result Value Ref Range    TSH 5.300 (H) 0.440 - 3.860 uIU/mL   APTT    Collection Time: 09/25/22 10:45 PM   Result Value Ref Range    aPTT 30.5 24.4 - 36.8 sec   Protime-INR    Collection Time: 09/25/22 10:45 PM   Result Value Ref Range    Protime 13.6 12.3 - 14.9 sec    INR 1.0    EKG 12 Lead    Collection Time: 09/25/22 10:46 PM   Result Value Ref Range    Ventricular Rate 86 BPM    Atrial Rate 86 BPM    P-R Interval 130 ms    QRS Duration 86 ms    Q-T Interval 352 ms    QTc Calculation (Bazett) 421 ms    P Axis 48 degrees    R Axis 17 degrees    T Axis -19 degrees   COVID-19, Rapid    Collection Time: 09/25/22 11:01 PM    Specimen: Nasopharyngeal Swab   Result Value Ref Range    SARS-CoV-2, NAAT Not Detected Not Detected   Urinalysis with Reflex to Culture    Collection Time: 09/25/22 11:38 PM    Specimen: Urine   Result Value Ref Range    Color, UA Yellow Straw/Yellow    Clarity, UA Clear Clear    Glucose, Ur Negative Negative mg/dL    Bilirubin Urine Negative Negative    Ketones, Urine TRACE (A) Negative mg/dL    Specific Gravity, UA 1.020 1.005 - 1.030    Blood, Urine Negative Negative    pH, UA 6.0 5.0 - 9.0    Protein,  (A) Negative mg/dL    Urobilinogen, Urine 0.2 <2.0 E.U./dL    Nitrite, Urine Negative Negative    Leukocyte Esterase, Urine SMALL (A) Negative    Urine Reflex to Culture Yes    Microscopic Urinalysis    Collection Time: 09/25/22 11:38 PM   Result Value Ref Range    Bacteria, UA FEW (A) Negative /HPF    Hyaline Casts, UA 3-5 0 - 5 /HPF    WBC, UA 20-50 (A) 0 - 5 /HPF    Epithelial Cells, UA 3-5 0 - 5 /HPF       IMAGING:   XR CHEST PORTABLE    Result Date: 9/25/2022  No acute process. VTE Prophylaxis: low molecular weight heparin -  start     ASSESSMENT AND PLAN    Principal Problem:  AMS: Decreased oral intake. Increased fatigue. Subjective increasing confusion. Fever with unknown etiology; possibly developing pneumonia. UA negative. DEION on CKD and lab work. Serum WBC 7.4. Chest x-ray negative. Fever resolved after Tylenol and saline bolus. We will monitor for fever returning. We will consult ID. We will get respiratory panel. We will continue IV fluids. We will encourage oral intake. We will consult dietary. PNA: Possible developing pneumonia based on fever and physical exam.  We will start patient on azithromycin and Rocephin for CAP. We will monitor CBC daily. We will monitor respiratory status closely    Elevated troponin: Troponin 0.019. EKG shows no ST elevation. She denies chest pain. Likely due to DEION on CKD. We will cycle troponin. We will repeat EKG. DEION on CKD: History of CKD 3. Creatinine 2.90. Baseline around 1.5. Received 1 L normal saline in ED. Likely due to poor oral intake. We will continue IV hydration. We will encourage oral intake. We will monitor with CMP daily. We will avoid any nephrotoxic agents whenever possible. Active problems:  HTN: PT on home meds to control. Will resume home meds, as tolerated. Dementia: PT on home meds to control. Will resume home meds, as tolerated. History of TIA: Patient on Plavix. Will resume home meds  GERD: Patient on PPI. Will resume home meds  COPD: Patient on scheduled inhalers and rescue inhalers. We will resume home meds.   We will monitor respiratory status closely    Plan of care discussed with: patient    SIGNATURE: MelyCHEVY Chow CNP  DATE: September 26, 2022  TIME: 1:58 AM     Mic Girard MD - supervising physician

## 2022-09-26 NOTE — CARE COORDINATION
This Care Transition Nurse provided Pneumonia booklet and zones sheet and reviewed patient and her son, dAalgisa Coe. Stressed importance of phoning physician promptly for symptoms in the yellow zone and ems for symptoms in the red zone. Discussed causes of pneumonia, symptoms, treatment, tests that may be ordered. Discussed importance of taking antibiotics until gone, drinking plenty of fluids, especially water, coughing and deep breathing. Get adequate rest and eat a well balanced diet. Importance of infection prevention: good handwashing, disposing of used tissues in the proper receptacle, masking. Avoid drinking alcoholic beverages. Discussed importance of vaccines. Stressed importance of smoking cessation and informed of [facility] [address] as a resource. Stressed importance of physician follow up within one week of discharge. Patient voiced understanding. Living[place] Signs states patient does not eat or drink fluids very well. She has ONS- Ensure, Boost- at home but does not like to drink it very often. Adalgisa Signs states patient does not walk very well so is very sedentary at home. She lives with her daughter who also smokes. Advised that she also encourage her daughter to quit smoking.

## 2022-09-26 NOTE — PROGRESS NOTES
Comprehensive Nutrition Assessment    Type and Reason for Visit:  Initial, Consult (Poor intakes)    Nutrition Recommendations/Plan:   Recommend running supplemental PN-Adult Premix (4.25/10) @42ml/hr through peripheral line to help combat malnutrition status   Monitor prevalent lab values as pt is at high risk for refeeding syndrome  Trial high calorie supplement TID      Malnutrition Assessment:  Malnutrition Status:  Severe malnutrition (09/26/22 1700)    Context:  Chronic Illness     Findings of the 6 clinical characteristics of malnutrition:  Energy Intake:  75% or less estimated energy requirements for 1 month or longer  Weight Loss:  Unable to assess     Body Fat Loss:  Severe body fat loss Buccal region, Orbital, Triceps   Muscle Mass Loss:  Severe muscle mass loss Temples (temporalis), Hand (interosseous), Clavicles (pectoralis & deltoids), Calf (gastrocnemius)  Fluid Accumulation:  Unable to assess     Strength:  Not Performed    Nutrition Assessment:    Pt presents with severe malnutrition evidenced by significant muscle and subcutaneous fat wasting and prolonged anorexia. Recommend running supplemental PN through peripheral line to help combat malnutrition status. Pt would benefit from EN for long term nutrition support if it aligns with POC. RD to trial high calorie supplement and follow up for acceptance. Pt is at high risk for refeeding syndrome. Nutrition Related Findings:    Pt presents AMS and FTT with anorexia x 4 days and DEION. Pmhx:  Dementia, HTN, CKD3, COPD, GERD, Hyperparathyroidism, pancreatitis, bowel disease. No edema noted. Last BM 9/26. NS @50ml/hr. Labs (9/26): K=2.6; BUN/Cr=58/2.73; GFR=16.9. Meds reviewed. Appears Cachectic. A&Ox1-2. Wound Type: None       Current Nutrition Intake & Therapies:    Average Meal Intake: 0%  Average Supplements Intake: None Ordered  ADULT DIET;  Regular    Anthropometric Measures:  Height: 5' 4\" (162.6 cm)  Ideal Body Weight (IBW): 120 lbs (55 kg) Admission Body Weight: 81 lb (36.7 kg) (stated 9/25)  Current Body Weight: 81 lb (36.7 kg) (9/25),   Weight Source: Stated  Current BMI (kg/m2): 13.9  Usual Body Weight: 83 lb 6 oz (37.8 kg) (bed 5/13/22; 98 lbs bed 6/2020)  % Weight Change (Calculated): -2.8                    BMI Categories: Underweight (BMI less than 22) age over 72    Estimated Daily Nutrient Needs:  Energy Requirements Based On: Kcal/kg  Weight Used for Energy Requirements: Current  Energy (kcal/day): 1247-1284kcal (34-35kcal/kg)  Weight Used for Protein Requirements: Ideal  Protein (g/day): 47-55g (1.3-1.5g/kg) monitor renal labs  Method Used for Fluid Requirements: ml/Kg  Fluid (ml/day): ~1630ml (30ml/kg of IBW) or per MD    Nutrition Diagnosis:   Severe malnutrition, In context of chronic illness related to inadequate protein-energy intake as evidenced by severe loss of subcutaneous fat, severe muscle loss, intake 0-25%, Criteria as identified in malnutrition assessment    Nutrition Interventions:   Food and/or Nutrient Delivery: Continue Current Diet, Start Oral Nutrition Supplement  Nutrition Education/Counseling: No recommendation at this time  Coordination of Nutrition Care: Continue to monitor while inpatient       Goals:     Goals: PO intake 75% or greater, other (specify)  Specify Other Goals: weight gain    Nutrition Monitoring and Evaluation:   Behavioral-Environmental Outcomes: None Identified  Food/Nutrient Intake Outcomes: Food and Nutrient Intake, Supplement Intake  Physical Signs/Symptoms Outcomes: Biochemical Data, Weight, Meal Time Behavior, Nutrition Focused Physical Findings    Discharge Planning:     Too soon to determine, Continue Oral Nutrition Supplement     Rox Steele, MS, RD, LD

## 2022-09-27 ENCOUNTER — APPOINTMENT (OUTPATIENT)
Dept: CT IMAGING | Age: 77
DRG: 689 | End: 2022-09-27
Payer: MEDICARE

## 2022-09-27 LAB
ALBUMIN SERPL-MCNC: 3.4 G/DL (ref 3.5–4.6)
ALP BLD-CCNC: 70 U/L (ref 40–130)
ALT SERPL-CCNC: 16 U/L (ref 0–33)
ANION GAP SERPL CALCULATED.3IONS-SCNC: 17 MEQ/L (ref 9–15)
AST SERPL-CCNC: 25 U/L (ref 0–35)
BASOPHILS ABSOLUTE: 0 K/UL (ref 0–0.2)
BASOPHILS RELATIVE PERCENT: 0.3 %
BILIRUB SERPL-MCNC: <0.2 MG/DL (ref 0.2–0.7)
BUN BLDV-MCNC: 48 MG/DL (ref 8–23)
CALCIUM SERPL-MCNC: 8.5 MG/DL (ref 8.5–9.9)
CHLORIDE BLD-SCNC: 110 MEQ/L (ref 95–107)
CO2: 13 MEQ/L (ref 20–31)
CREAT SERPL-MCNC: 2.08 MG/DL (ref 0.5–0.9)
EOSINOPHILS ABSOLUTE: 0 K/UL (ref 0–0.7)
EOSINOPHILS RELATIVE PERCENT: 0.6 %
FOLATE: >20 NG/ML
GFR AFRICAN AMERICAN: 28
GFR NON-AFRICAN AMERICAN: 23.1
GLOBULIN: 2.7 G/DL (ref 2.3–3.5)
GLUCOSE BLD-MCNC: 92 MG/DL (ref 70–99)
HCT VFR BLD CALC: 39 % (ref 37–47)
HEMOGLOBIN: 12.7 G/DL (ref 12–16)
LYMPHOCYTES ABSOLUTE: 0.8 K/UL (ref 1–4.8)
LYMPHOCYTES RELATIVE PERCENT: 15.1 %
MAGNESIUM: 2 MG/DL (ref 1.7–2.4)
MCH RBC QN AUTO: 34.8 PG (ref 27–31.3)
MCHC RBC AUTO-ENTMCNC: 32.6 % (ref 33–37)
MCV RBC AUTO: 107 FL (ref 82–100)
MONOCYTES ABSOLUTE: 0.5 K/UL (ref 0.2–0.8)
MONOCYTES RELATIVE PERCENT: 8.6 %
NEUTROPHILS ABSOLUTE: 4.2 K/UL (ref 1.4–6.5)
NEUTROPHILS RELATIVE PERCENT: 75.4 %
PDW BLD-RTO: 14.9 % (ref 11.5–14.5)
PLATELET # BLD: 166 K/UL (ref 130–400)
POTASSIUM SERPL-SCNC: 2.8 MEQ/L (ref 3.4–4.9)
RBC # BLD: 3.64 M/UL (ref 4.2–5.4)
SODIUM BLD-SCNC: 140 MEQ/L (ref 135–144)
TOTAL PROTEIN: 6.1 G/DL (ref 6.3–8)
VITAMIN B-12: 717 PG/ML (ref 232–1245)
WBC # BLD: 5.6 K/UL (ref 4.8–10.8)

## 2022-09-27 PROCEDURE — 83735 ASSAY OF MAGNESIUM: CPT

## 2022-09-27 PROCEDURE — 6360000002 HC RX W HCPCS: Performed by: INTERNAL MEDICINE

## 2022-09-27 PROCEDURE — 6360000004 HC RX CONTRAST MEDICATION: Performed by: INTERNAL MEDICINE

## 2022-09-27 PROCEDURE — 74176 CT ABD & PELVIS W/O CONTRAST: CPT

## 2022-09-27 PROCEDURE — 6370000000 HC RX 637 (ALT 250 FOR IP): Performed by: INTERNAL MEDICINE

## 2022-09-27 PROCEDURE — A4641 RADIOPHARM DX AGENT NOC: HCPCS | Performed by: INTERNAL MEDICINE

## 2022-09-27 PROCEDURE — 2580000003 HC RX 258: Performed by: NURSE PRACTITIONER

## 2022-09-27 PROCEDURE — 2060000000 HC ICU INTERMEDIATE R&B

## 2022-09-27 PROCEDURE — 99232 SBSQ HOSP IP/OBS MODERATE 35: CPT | Performed by: INTERNAL MEDICINE

## 2022-09-27 PROCEDURE — 80053 COMPREHEN METABOLIC PANEL: CPT

## 2022-09-27 PROCEDURE — 85025 COMPLETE CBC W/AUTO DIFF WBC: CPT

## 2022-09-27 PROCEDURE — 36415 COLL VENOUS BLD VENIPUNCTURE: CPT

## 2022-09-27 PROCEDURE — 2580000003 HC RX 258: Performed by: INTERNAL MEDICINE

## 2022-09-27 PROCEDURE — 6360000002 HC RX W HCPCS: Performed by: NURSE PRACTITIONER

## 2022-09-27 RX ORDER — DOCUSATE SODIUM 100 MG/1
100 CAPSULE, LIQUID FILLED ORAL 2 TIMES DAILY
Status: DISCONTINUED | OUTPATIENT
Start: 2022-09-27 | End: 2022-09-29 | Stop reason: HOSPADM

## 2022-09-27 RX ORDER — POTASSIUM CHLORIDE 7.45 MG/ML
10 INJECTION INTRAVENOUS
Status: COMPLETED | OUTPATIENT
Start: 2022-09-27 | End: 2022-09-27

## 2022-09-27 RX ORDER — POTASSIUM CHLORIDE 20 MEQ/1
40 TABLET, EXTENDED RELEASE ORAL ONCE
Status: COMPLETED | OUTPATIENT
Start: 2022-09-27 | End: 2022-09-27

## 2022-09-27 RX ORDER — SENNA PLUS 8.6 MG/1
2 TABLET ORAL NIGHTLY
Status: DISCONTINUED | OUTPATIENT
Start: 2022-09-27 | End: 2022-09-29 | Stop reason: HOSPADM

## 2022-09-27 RX ORDER — POLYETHYLENE GLYCOL 3350 17 G/17G
17 POWDER, FOR SOLUTION ORAL DAILY
Status: DISCONTINUED | OUTPATIENT
Start: 2022-09-27 | End: 2022-09-29 | Stop reason: HOSPADM

## 2022-09-27 RX ADMIN — AZITHROMYCIN MONOHYDRATE 500 MG: 500 INJECTION, POWDER, LYOPHILIZED, FOR SOLUTION INTRAVENOUS at 05:24

## 2022-09-27 RX ADMIN — PANCRELIPASE 24000 UNITS: 60000; 12000; 38000 CAPSULE, DELAYED RELEASE PELLETS ORAL at 08:12

## 2022-09-27 RX ADMIN — MIRTAZAPINE 15 MG: 15 TABLET, FILM COATED ORAL at 20:14

## 2022-09-27 RX ADMIN — POTASSIUM CHLORIDE 10 MEQ: 7.46 INJECTION, SOLUTION INTRAVENOUS at 14:01

## 2022-09-27 RX ADMIN — CEFTRIAXONE SODIUM 1000 MG: 1 INJECTION, POWDER, FOR SOLUTION INTRAMUSCULAR; INTRAVENOUS at 04:52

## 2022-09-27 RX ADMIN — SODIUM CHLORIDE: 9 INJECTION, SOLUTION INTRAVENOUS at 04:51

## 2022-09-27 RX ADMIN — BARIUM SULFATE 450 ML: 20 SUSPENSION ORAL at 11:11

## 2022-09-27 RX ADMIN — HEPARIN SODIUM 5000 UNITS: 5000 INJECTION INTRAVENOUS; SUBCUTANEOUS at 20:13

## 2022-09-27 RX ADMIN — Medication 10 ML: at 20:15

## 2022-09-27 RX ADMIN — PROPRANOLOL HYDROCHLORIDE 40 MG: 40 TABLET ORAL at 08:12

## 2022-09-27 RX ADMIN — DOCUSATE SODIUM 100 MG: 100 CAPSULE, LIQUID FILLED ORAL at 12:07

## 2022-09-27 RX ADMIN — POTASSIUM CHLORIDE 10 MEQ: 7.46 INJECTION, SOLUTION INTRAVENOUS at 14:55

## 2022-09-27 RX ADMIN — HEPARIN SODIUM 5000 UNITS: 5000 INJECTION INTRAVENOUS; SUBCUTANEOUS at 08:12

## 2022-09-27 RX ADMIN — POTASSIUM CHLORIDE 40 MEQ: 1500 TABLET, EXTENDED RELEASE ORAL at 09:47

## 2022-09-27 RX ADMIN — CLOPIDOGREL BISULFATE 75 MG: 75 TABLET ORAL at 08:12

## 2022-09-27 RX ADMIN — DOCUSATE SODIUM 100 MG: 100 CAPSULE, LIQUID FILLED ORAL at 20:14

## 2022-09-27 RX ADMIN — SENNOSIDES 17.2 MG: 8.6 TABLET, FILM COATED ORAL at 20:14

## 2022-09-27 RX ADMIN — POLYETHYLENE GLYCOL 3350 17 G: 17 POWDER, FOR SOLUTION ORAL at 15:20

## 2022-09-27 RX ADMIN — PROPRANOLOL HYDROCHLORIDE 40 MG: 40 TABLET ORAL at 20:14

## 2022-09-27 NOTE — PROGRESS NOTES
Physical Therapy Missed Treatment   Facility/Department: [facility] MED SURG [id number]    NAME: Jyothi Vila    : 1945 (77 y.o.)  MRN: [id number]    Account: [de-identified]  Gender: female    Chart reviewed, attempted PT at 1330. Patient unavailable 2° to:    Attempted to treat pt. Pt having hallucinations. Alert and oriented to person and place. Disoriented to time. Pt states she would like to get in the chair. After room set up pt declined to get up from bed stating \"I don't want to get in trouble with the hospital.  Call my daughter. \"  Reassured pt it is ok to get up with the hospital staff. Pt cont to decline. Cont to reassure and encourage pt. When blankets removed bed soiled with stool. RN and PCA notified.         Electronically signed by Constance Lindquist PTA on 22 at 1:38 PM EDT

## 2022-09-27 NOTE — CARE COORDINATION
Met with pt, daughter Una Joseph and son to discuss discharge planning. Discussed therapy recommendation of SNF for continued therapy. FOC given and family would like [id number]. Updated Abby Vargas.

## 2022-09-27 NOTE — PROGRESS NOTES
7467: Am assessment per flow sheet, meds per eMAR. Patient repositioned, bed alarm engaged and working. Call light in reach. 1030: Patient to CT via cart for CT ABD/PELVIS.   1200: Dr. Pito Dacosta here for rounds; new orders. Soap suds enema given per orders. Patient had medium bowel movement after enema. Will monitor. 1300: Patient had very large bm; complete linen and gown changed. 1430: 2nd order for soaps suds enema; given. Patient had large bm. Depends on paient and linen changed. 1850: Patient had several bowel movements, rest of shift. Assisted up to [facility] for bowel movement. Safety maintained.  Electronically signed by Mary Alice Roman RN on 9/27/2022 at 6:58 PM

## 2022-09-27 NOTE — PROGRESS NOTES
Physical Therapy Missed Treatment   Facility/Department: [facility] MED SURG [id number]    NAME: Kayleen Collier  Patient Status:   : 1945 (78 y.o.)  MRN: [id number]  Account: [de-identified]  Gender: female        [] Patient Declines PT Treatment            [x] Patient Unavailable:     Arrived to pts room and she said she needed cleaned up from being incontinent in her bed. Assisted pt with finding call light and calling for staff. Will attempt PT Treatment again at earliest convenience.         Electronically signed by Munir Wheat PTA on 22 at 10:32 AM EDT

## 2022-09-27 NOTE — PROGRESS NOTES
Hospitalist Progress Note      PCP: Manjinder Smiley MD    Date of Admission: 9/25/2022    Chief Complaint:    Chief Complaint   Patient presents with    Illness     Times four days pt not eating and drinking       Subjective:  Patient denies fevers, chills, sweats, CP, SOB, diarrhea or burning micturition. Decreased appetite. 12 point ROS negative other than mentioned above     Medications:  Reviewed    Infusion Medications    sodium chloride 50 mL/hr at 09/27/22 0451    sodium chloride       Scheduled Medications    docusate sodium  100 mg Oral BID    senna  2 tablet Oral Nightly    potassium chloride  10 mEq IntraVENous Q1H    sodium chloride flush  5-40 mL IntraVENous 2 times per day    heparin (porcine)  5,000 Units SubCUTAneous BID    cefTRIAXone (ROCEPHIN) IV  1,000 mg IntraVENous Q24H    azithromycin  500 mg IntraVENous Q24H    clopidogrel  75 mg Oral Daily    lipase-protease-amylase  24,000 Units Oral Daily    [Held by provider] losartan  50 mg Oral Daily    mirtazapine  15 mg Oral Nightly    propranolol  40 mg Oral BID     PRN Meds: sodium chloride flush, sodium chloride, ondansetron **OR** ondansetron, polyethylene glycol, acetaminophen **OR** acetaminophen      Intake/Output Summary (Last 24 hours) at 9/27/2022 1337  Last data filed at 9/27/2022 9931  Gross per 24 hour   Intake 1487.68 ml   Output 500 ml   Net 987.68 ml       Exam:    BP (!) 174/84   Pulse 63   Temp 97.6 °F (36.4 °C)   Resp 18   Ht 5' 4\" (1.626 m)   Wt 78 lb 3.2 oz (35.5 kg)   LMP  (LMP Unknown)   SpO2 100%   BMI 13.42 kg/m²     General appearance: No apparent distress, appears stated age and cooperative. HEENT:  Conjunctivae/corneas clear. Neck: Trachea midline. Respiratory:  Normal respiratory effort. Clear to auscultation  Cardiovascular: Regular rate and rhythm  Abdomen: Soft, non-tender, non-distended with normal bowel sounds.   Musculoskeletal: No clubbing, cyanosis or edema bilaterally  Neuro: Non Focal.   Capillary Refill: Brisk,< 3 seconds   Peripheral Pulses: +2 palpable, equal bilaterally     Labs:   Recent Labs     09/25/22 1915 09/26/22 0453 09/27/22  0618   WBC 7.4 5.9 5.6   HGB 12.8 11.6* 12.7   HCT 38.1 35.4* 39.0    154 166       Recent Labs     09/25/22 1915 09/26/22 0453 09/27/22  0618    139 140   K 3.6 2.6* 2.8*   * 110* 110*   CO2 14* 12* 13*   BUN 62* 58* 48*   CREATININE 2.90* 2.73* 2.08*   CALCIUM 9.1 8.4* 8.5       Recent Labs     09/25/22 1915 09/26/22 0453 09/27/22  0618   AST 21 20 25   ALT 13 11 16   BILITOT 0.3 0.3 <0.2   ALKPHOS 77 71 70       Recent Labs     09/25/22 2245   INR 1.0       Recent Labs     09/25/22 2245 09/25/22  2330 09/26/22  0453 09/26/22  1127   CKTOTAL 40  --   --   --    TROPONINI  --  0.019* 0.014* 0.013*       Urinalysis:      Lab Results   Component Value Date/Time    NITRU Negative 09/25/2022 11:38 PM    WBCUA 20-50 09/25/2022 11:38 PM    BACTERIA FEW 09/25/2022 11:38 PM    RBCUA 3-5 09/06/2022 02:37 PM    BLOODU Negative 09/25/2022 11:38 PM    SPECGRAV 1.020 09/25/2022 11:38 PM    GLUCOSEU Negative 09/25/2022 11:38 PM     Radiology:  CT ABDOMEN PELVIS WO CONTRAST Additional Contrast? Oral   Final Result   Fecal impaction, extensive residual stool is seen. Distended loops of large bowel. Irregularity in the contour of the kidneys, rule out renal masses. Extensive degenerative bone changes. XR CHEST PORTABLE   Final Result   No acute process. Assessment/Plan:    #AMS    - improved; possibly due to UTI; continue IV Abx per ID    #? PNA    - no clear infiltrate on CXR    #Elevated troponinin     - likely due to CKD    #DEION on CKD    - Improving     #HTN/Dementia/TIA hx/ GERD/COPD    - continue home meds    #Constipation with impaction    - enema ordered at noon; repeat now; once cleared we can try to manage with PO    Active Hospital Problems    Diagnosis Date Noted    AMS (altered mental status) [R41.82] 09/26/2022 Priority: Medium      Additional work up or/and treatment plan may be added today or then after based on clinical progression. I am managing a portion of pt care. Some medical issues are handled by other specialists. Additional work up and treatment should be done in out pt setting by pt PCP and other out pt providers. In addition to examining and evaluating pt, I spent additional time explaining care, normal and abnormal findings, and treatment plan. All of pt questions were answered. Counseling, diet and education were  provided. Case will be discussed with nursing staff when appropriate. Family will be updated if and when appropriate. Diet: ADULT DIET;  Regular  ADULT ORAL NUTRITION SUPPLEMENT; Breakfast, Lunch, Dinner; Standard High Calorie/High Protein Oral Supplement    Code Status: Full Code    PT/OT Eval     Electronically signed by Maricruz Overton MD on 9/27/2022 at 1:37 PM

## 2022-09-27 NOTE — CONSULTS
Infectious Disease     Patient Name: Evert Pettit  Date: 9/27/2022  YOB: 1945  Medical Record Number: [id number]        Chief Complaint   Patient presents with    Illness     Times four days pt not eating and drinking            History of Present Illness: Patient presents with confusion. She usually lives with daughter. Son is at bedside. Said over this past weekend became more confused than usual was unsure of where things were at. Sent here. There was concern of infection fever found. Started on Rocephin azithromycin. She is feeling better. Denies any headache. Patient is losing weight last several months no appetite history of UTIs in the past chronic cough. Review of Systems: All other ROS reviewed and are negative other than as stated in HPI            Social History     Tobacco Use    Smoking status: Every Day     Packs/day: 3.00     Years: 54.00     Pack years: 162.00     Types: Cigarettes    Smokeless tobacco: Never   Substance Use Topics    Alcohol use: No     Alcohol/week: 0.0 standard drinks    Drug use: No         Past Medical History:   Diagnosis Date    [facility] (acute [facility]) [facility] 8/13/2022    Bowel disease     Cataract     Chronic back pain     Chronic renal disease, stage III [facility]) [[id number]] 5/13/2022    COPD (chronic obstructive pulmonary disease) ([facility]     Dementia ([address]     Dementia with behavioral disturbance, unspecified dementia type ([address] 3/11/2022    Dizziness and giddiness     GERD (gastroesophageal reflux disease)     Glaucoma     Hypertension     Osteoarthritis     Pancreatitis     Seizures (HCC)     TIA (transient ischemic attack)            Past Surgical History:   Procedure Laterality Date    APPENDECTOMY      CHOLECYSTECTOMY      INTESTINAL BYPASS      jejunoileal         No current facility-administered medications on file prior to encounter.      Current Outpatient Medications on File Prior to Encounter   Medication Sig Dispense Refill    famotidine (PEPCID) 20 MG tablet Take 20 mg by mouth at bedtime      diphenoxylate-atropine (DIPHENATOL) 2.5-0.025 MG per tablet Take 1 tablet by mouth 4 times daily as needed for Diarrhea for up to 10 days.  40 tablet 0    CREON 54535-20411 units delayed release capsule TAKE 1 CAPSULE TWICE A DAY (Patient taking differently: Take 1 capsule by mouth daily) 180 capsule 3    clopidogrel (PLAVIX) 75 MG tablet TAKE 1 TABLET DAILY 90 tablet 3    pantoprazole (PROTONIX) 40 MG tablet TAKE 1 TABLET DAILY 90 tablet 3    propranolol (INDERAL) 40 MG tablet TAKE 1 TABLET TWICE A DAY (REPLACES PREVIOUS PRESCRIPTION) 180 tablet 3    mirtazapine (REMERON) 15 MG tablet TAKE 1 TABLET EVERY NIGHT 90 tablet 3    clonazePAM (KLONOPIN) 0.5 MG tablet TAKE 1 TABLET BY MOUTH EVERY NIGHT AS NEEDED FOR ANXIETY 30 tablet 2    Magnesium Oxide 400 MG CAPS Take 400 mg by mouth 3 times daily 270 capsule 3    albuterol sulfate HFA (VENTOLIN HFA) 108 (90 Base) MCG/ACT inhaler USE 2 INHALATIONS EVERY 6 HOURS AS NEEDED FOR WHEEZING 54 g 2    tiotropium-olodaterol (STIOLTO) 2.5-2.5 MCG/ACT AERS Inhale 2 puffs into the lungs daily 3 each 3    Multiple Vitamins-Minerals (MULTIVITAMIN WOMEN 50+) TABS TAKE 1 TABLET BY MOUTH EVERY DAY 90 tablet 1    metroNIDAZOLE (FLAGYL) 500 MG tablet TAKE 1 TABLET THREE TIMES A DAY FOR 10 DAYS PER MONTH 90 tablet 3    losartan (COZAAR) 50 MG tablet TAKE 1 TABLET DAILY 90 tablet 3    clotrimazole-betamethasone (LOTRISONE) 1-0.05 % cream APPLY TOPICALLY TWICE A DAY 45 g 14       Allergies   Allergen Reactions    Aspirin      Bothers stomach    Penicillins      Unable to take    Adhesive Tape Rash     Patient states Silk Tape works the best         Family History   Problem Relation Age of Onset    Arthritis Other     Heart Disease Other     High Blood Pressure Other     Kidney Disease Other     Other Other         respiratory problems    Breast Cancer Neg Hx     Colon Cancer Neg Hx     Diabetes Neg Hx     Cancer Neg Hx     Eclampsia

## 2022-09-27 NOTE — CONSULTS
[facility]  Consult Note           Reason for Consult: DEION  Requesting Physician:  Dr. Dotty Long    Chief Complaint:  fever, poor PO intake  History Obtained From:  patient, electronic medical record    History of Present Ilness:    69 y.o. female with history s/f HTN, OA, dementia, seizures, TIA, GERD, COPD and chronic back pain who presented w/ AMS and fatigue w/ poor PO intake for ~4 days. On presentation pt febrile to 101.5. Not hypotensive. Scr 2.9 on presentation (baseline Scr ~1.4-1.5 w/ eGFR low/mid 40s), acidotic, low K. UA w/ trace ketones, prot, LE, few bacteria. CT A/P w/ residual stool and fecal impaction. Currently on NS at 50 ml/hr. Past Medical History:        Diagnosis Date    DEION (acute kidney injury) ([address] 8/13/2022    Bowel disease     Cataract     Chronic back pain     Chronic renal disease, stage III [facility]) [[id number]] 5/13/2022    COPD (chronic obstructive pulmonary disease) (HCC)     Dementia (HCC)     Dementia with behavioral disturbance, unspecified dementia type ([address] 3/11/2022    Dizziness and giddiness     GERD (gastroesophageal reflux disease)     Glaucoma     Hypertension     Osteoarthritis     Pancreatitis     Seizures (HCC)     TIA (transient ischemic attack)        Past Surgical History:        Procedure Laterality Date    APPENDECTOMY      CHOLECYSTECTOMY      INTESTINAL BYPASS      jejunoileal       Home Medications:    No current facility-administered medications on file prior to encounter. Current Outpatient Medications on File Prior to Encounter   Medication Sig Dispense Refill    famotidine (PEPCID) 20 MG tablet Take 20 mg by mouth at bedtime      diphenoxylate-atropine (DIPHENATOL) 2.5-0.025 MG per tablet Take 1 tablet by mouth 4 times daily as needed for Diarrhea for up to 10 days.  40 tablet 0    CREON 14572-27306 units delayed release capsule TAKE 1 CAPSULE TWICE A DAY (Patient taking differently: Take 1 capsule by mouth daily) 180 capsule 3    clopidogrel (PLAVIX) 75 MG tablet TAKE 1 TABLET DAILY 90 tablet 3    pantoprazole (PROTONIX) 40 MG tablet TAKE 1 TABLET DAILY 90 tablet 3    propranolol (INDERAL) 40 MG tablet TAKE 1 TABLET TWICE A DAY (REPLACES PREVIOUS PRESCRIPTION) 180 tablet 3    mirtazapine (REMERON) 15 MG tablet TAKE 1 TABLET EVERY NIGHT 90 tablet 3    clonazePAM (KLONOPIN) 0.5 MG tablet TAKE 1 TABLET BY MOUTH EVERY NIGHT AS NEEDED FOR ANXIETY 30 tablet 2    Magnesium Oxide 400 MG CAPS Take 400 mg by mouth 3 times daily 270 capsule 3    albuterol sulfate HFA (VENTOLIN HFA) 108 (90 Base) MCG/ACT inhaler USE 2 INHALATIONS EVERY 6 HOURS AS NEEDED FOR WHEEZING 54 g 2    tiotropium-olodaterol (STIOLTO) 2.5-2.5 MCG/ACT AERS Inhale 2 puffs into the lungs daily 3 each 3    Multiple Vitamins-Minerals (MULTIVITAMIN WOMEN 50+) TABS TAKE 1 TABLET BY MOUTH EVERY DAY 90 tablet 1    metroNIDAZOLE (FLAGYL) 500 MG tablet TAKE 1 TABLET THREE TIMES A DAY FOR 10 DAYS PER MONTH 90 tablet 3    losartan (COZAAR) 50 MG tablet TAKE 1 TABLET DAILY 90 tablet 3    clotrimazole-betamethasone (LOTRISONE) 1-0.05 % cream APPLY TOPICALLY TWICE A DAY 45 g 14       Allergies:  Aspirin, Penicillins, and Adhesive tape    Social History:    Social History     Socioeconomic History    Marital status:      Spouse name: Not on file    Number of children: Not on file    Years of education: Not on file    Highest education level: Not on file   Occupational History    Not on file   Tobacco Use    Smoking status: Every Day     Packs/day: 3.00     Years: 54.00     Pack years: 162.00     Types: Cigarettes    Smokeless tobacco: Never   Substance and Sexual Activity    Alcohol use: No     Alcohol/week: 0.0 standard drinks    Drug use: No    Sexual activity: Not Currently     Partners: Male     Comment:    Other Topics Concern    Not on file   Social History Narrative    Not on file     Social Determinants of Health     Financial Resource Strain: Low Risk     Difficulty of Paying Living Expenses: Not hard at all   Food Insecurity: No Food Insecurity    Worried About [address] in the Last Year: Never true    Ran Out of Food in the Last Year: Never true   Transportation Needs: No Transportation Needs    Lack of Transportation (Medical): No    Lack of Transportation (Non-Medical):  No   Physical Activity: Not on file   Stress: Not on file   Social Connections: Not on file   Intimate Partner Violence: Not on file   Housing Stability: Not on file       Family History:   Family History   Problem Relation Age of Onset    Arthritis Other     Heart Disease Other     High Blood Pressure Other     Kidney Disease Other     Other Other         respiratory problems    Breast Cancer Neg Hx     Colon Cancer Neg Hx     Diabetes Neg Hx     Cancer Neg Hx     Eclampsia Neg Hx     Ovarian Cancer Neg Hx     Hypertension Neg Hx      Labor Neg Hx     Spont Abortions Neg Hx     Stroke Neg Hx        Review of Systems:   Positives in bold  Constitutional: fever, chills, fatigue, malaise   HENT:  rhinorrhea, sinus pain, sore throat, epistaxis    Eyes:  photophobia, visual disturbance, eye redness  Respiratory: shortness of breath, cough, hemoptysis    Cardiovascular: chest pain, palpitations, orthopnea, leg swelling   Gastrointestinal: abdominal pain, nausea, vomiting, diarrhea, constipation   Endocrine: polydipsia, polyphagia, cold intolerance, heat intolerance  Genitourinary: dysuria, flank pain, frequency, urgency   Hematologic: easy bruising, easy bleeding  Musculoskeletal: back pain, neck pain, myalgias, arthalgias  Neurological: syncope, lightheadedness, dizziness, seizures, tremors, weakness, confusion  Psychiatric/Behavioral: anxiety, depression, hallucinations  Skin: rash, itching    Physical exam:   Constitutional:  VITALS:  BP (!) 174/84   Pulse 63   Temp 97.6 °F (36.4 °C)   Resp 18   Ht 5' 4\" (1.626 m)   Wt 78 lb 3.2 oz (35.5 kg)   LMP  (LMP Unknown)   SpO2 100%   BMI 13.42 kg/m² General: alert, in no apparent distress, thin  HEENT: normocephalic, atraumatic, anicteric  Neck: supple, no mass  Lungs: non-labored respirations, clear to auscultation bilaterally  Heart: regular rate and rhythm, no murmurs or rubs  Abdomen: soft, non-tender, non-distended  MSK: no joint swelling or tenderness  Ext: no cyanosis, no peripheral edema  Neuro: alert and oriented, no gross abnormalities  Psych: normal mood and affect    Data/  CBC:   Lab Results   Component Value Date/Time    WBC 5.6 09/27/2022 06:18 AM    RBC 3.64 09/27/2022 06:18 AM    HGB 12.7 09/27/2022 06:18 AM    HCT 39.0 09/27/2022 06:18 AM    .0 09/27/2022 06:18 AM    MCH 34.8 09/27/2022 06:18 AM    MCHC 32.6 09/27/2022 06:18 AM    RDW 14.9 09/27/2022 06:18 AM     09/27/2022 06:18 AM    MPV 8.4 11/11/2014 03:14 PM     BMP:    Lab Results   Component Value Date/Time     09/27/2022 06:18 AM    K 2.8 09/27/2022 06:18 AM    K 5.1 08/16/2022 06:45 AM     09/27/2022 06:18 AM    CO2 13 09/27/2022 06:18 AM    BUN 48 09/27/2022 06:18 AM    LABALBU 3.4 09/27/2022 06:18 AM    CREATININE 2.08 09/27/2022 06:18 AM    CALCIUM 8.5 09/27/2022 06:18 AM    GFRAA 28.0 09/27/2022 06:18 AM    LABGLOM 23.1 09/27/2022 06:18 AM    GLUCOSE 92 09/27/2022 06:18 AM     CT ABDOMEN PELVIS WO CONTRAST Additional Contrast? Oral    Result Date: 9/27/2022  EXAMINATION: CT OF THE ABDOMEN AND PELVIS WITHOUT CONTRAST 9/27/2022 11:08 am TECHNIQUE: CT of the abdomen and pelvis was performed without the administration of intravenous contrast. Multiplanar reformatted images are provided for review. Automated exposure control, iterative reconstruction, and/or weight based adjustment of the mA/kV was utilized to reduce the radiation dose to as low as reasonably achievable.  COMPARISON: 10/06/2014 HISTORY: ORDERING SYSTEM PROVIDED HISTORY: fever, weight loss, flank pain TECHNOLOGIST PROVIDED HISTORY: Reason for exam:->fever, weight loss, flank pain Additional is seen. Peritoneum/Retroperitoneum: No evidence of free fluid or air within the peritoneal cavity. Bones/Soft Tissues: No evidence of lytic or sclerotic bone lesion is seen. Extensive degenerative changes of the lumbar spine visualized. Abdominal wall soft tissues are unremarkable. Extensive atherosclerotic calcifications visualized. Fecal impaction, extensive residual stool is seen. Distended loops of large bowel. Irregularity in the contour of the kidneys, rule out renal masses. Extensive degenerative bone changes. XR CHEST PORTABLE    Result Date: 9/25/2022  EXAMINATION: ONE XRAY VIEW OF THE CHEST 9/25/2022 11:13 pm COMPARISON: 04/16/2021 HISTORY: ORDERING SYSTEM PROVIDED HISTORY: sepsis TECHNOLOGIST PROVIDED HISTORY: Reason for exam:->sepsis FINDINGS: The lungs are without acute focal process. There is no effusion or pneumothorax. The cardiomediastinal silhouette is without acute process. The osseous structures are without acute process. No acute process. Assessment:  69 y.o. female with history s/f HTN, OA, dementia, seizures, TIA, GERD, COPD and chronic back pain who presented w/ AMS and fatigue w/ poor PO intake for ~4 days. DEION on CKD stage III: most likely 2/2 poor PO intake leading to volume depletion, Scr 2.9 on presentation, baseline Scr ~1.4-1.5 w/ eGFR low/mid 30s, risk factors for CKD recurrent AKIs, HTN   AMS  HTN  Metabolic acidosis  Hypokalemia  Hypoalbuminemia  ? UTI    Plan:  - currently on abx  - changing NS to LR at 50 ml/hr  - agree w/ K repletion   - cultures pending   - would benefit from swallow eval     Thank you for the consultation. Will continue to follow  Please do not hesitate to call with questions.     Chaya Ellsworth MD, MD

## 2022-09-28 LAB
ALBUMIN SERPL-MCNC: 3.8 G/DL (ref 3.5–4.6)
ALP BLD-CCNC: 71 U/L (ref 40–130)
ALT SERPL-CCNC: 16 U/L (ref 0–33)
ANION GAP SERPL CALCULATED.3IONS-SCNC: 14 MEQ/L (ref 9–15)
AST SERPL-CCNC: 24 U/L (ref 0–35)
BASOPHILS ABSOLUTE: 0 K/UL (ref 0–0.2)
BASOPHILS RELATIVE PERCENT: 0.1 %
BILIRUB SERPL-MCNC: <0.2 MG/DL (ref 0.2–0.7)
BUN BLDV-MCNC: 41 MG/DL (ref 8–23)
CALCIUM SERPL-MCNC: 9.1 MG/DL (ref 8.5–9.9)
CHLORIDE BLD-SCNC: 114 MEQ/L (ref 95–107)
CO2: 15 MEQ/L (ref 20–31)
CREAT SERPL-MCNC: 2.07 MG/DL (ref 0.5–0.9)
EOSINOPHILS ABSOLUTE: 0 K/UL (ref 0–0.7)
EOSINOPHILS RELATIVE PERCENT: 0.3 %
GFR AFRICAN AMERICAN: 28.1
GFR NON-AFRICAN AMERICAN: 23.3
GLOBULIN: 2.6 G/DL (ref 2.3–3.5)
GLUCOSE BLD-MCNC: 79 MG/DL (ref 70–99)
HCT VFR BLD CALC: 43.5 % (ref 37–47)
HEMOGLOBIN: 13.3 G/DL (ref 12–16)
LYMPHOCYTES ABSOLUTE: 0.7 K/UL (ref 1–4.8)
LYMPHOCYTES RELATIVE PERCENT: 10.4 %
MACROCYTES: ABNORMAL
MAGNESIUM: 1.9 MG/DL (ref 1.7–2.4)
MCH RBC QN AUTO: 35.2 PG (ref 27–31.3)
MCHC RBC AUTO-ENTMCNC: 30.5 % (ref 33–37)
MCV RBC AUTO: 115.4 FL (ref 82–100)
MONOCYTES ABSOLUTE: 0.6 K/UL (ref 0.2–0.8)
MONOCYTES RELATIVE PERCENT: 9.2 %
NEUTROPHILS ABSOLUTE: 5.3 K/UL (ref 1.4–6.5)
NEUTROPHILS RELATIVE PERCENT: 80 %
ORGANISM: ABNORMAL
PDW BLD-RTO: 15.9 % (ref 11.5–14.5)
PLATELET # BLD: 118 K/UL (ref 130–400)
PLATELET SLIDE REVIEW: ADEQUATE
POTASSIUM SERPL-SCNC: 3.3 MEQ/L (ref 3.4–4.9)
RBC # BLD: 3.77 M/UL (ref 4.2–5.4)
SODIUM BLD-SCNC: 143 MEQ/L (ref 135–144)
TOTAL PROTEIN: 6.4 G/DL (ref 6.3–8)
URINE CULTURE, ROUTINE: ABNORMAL
URINE CULTURE, ROUTINE: ABNORMAL
WBC # BLD: 6.7 K/UL (ref 4.8–10.8)

## 2022-09-28 PROCEDURE — 6360000002 HC RX W HCPCS: Performed by: NURSE PRACTITIONER

## 2022-09-28 PROCEDURE — 2580000003 HC RX 258: Performed by: NURSE PRACTITIONER

## 2022-09-28 PROCEDURE — 6370000000 HC RX 637 (ALT 250 FOR IP): Performed by: INTERNAL MEDICINE

## 2022-09-28 PROCEDURE — 2580000003 HC RX 258: Performed by: INTERNAL MEDICINE

## 2022-09-28 PROCEDURE — 6360000002 HC RX W HCPCS: Performed by: INTERNAL MEDICINE

## 2022-09-28 PROCEDURE — 99212 OFFICE O/P EST SF 10 MIN: CPT

## 2022-09-28 PROCEDURE — 97116 GAIT TRAINING THERAPY: CPT

## 2022-09-28 PROCEDURE — 85025 COMPLETE CBC W/AUTO DIFF WBC: CPT

## 2022-09-28 PROCEDURE — 97535 SELF CARE MNGMENT TRAINING: CPT

## 2022-09-28 PROCEDURE — 83735 ASSAY OF MAGNESIUM: CPT

## 2022-09-28 PROCEDURE — 80053 COMPREHEN METABOLIC PANEL: CPT

## 2022-09-28 PROCEDURE — 2060000000 HC ICU INTERMEDIATE R&B

## 2022-09-28 PROCEDURE — 6370000000 HC RX 637 (ALT 250 FOR IP): Performed by: NURSE PRACTITIONER

## 2022-09-28 PROCEDURE — 36415 COLL VENOUS BLD VENIPUNCTURE: CPT

## 2022-09-28 RX ORDER — DRONABINOL 2.5 MG/1
2.5 CAPSULE ORAL 2 TIMES DAILY
Status: DISCONTINUED | OUTPATIENT
Start: 2022-09-28 | End: 2022-09-29 | Stop reason: HOSPADM

## 2022-09-28 RX ORDER — POTASSIUM CHLORIDE 7.45 MG/ML
10 INJECTION INTRAVENOUS ONCE
Status: COMPLETED | OUTPATIENT
Start: 2022-09-28 | End: 2022-09-28

## 2022-09-28 RX ORDER — L. ACIDOPHILUS/L.BULGARICUS 1MM CELL
4 TABLET ORAL 3 TIMES DAILY
Status: DISCONTINUED | OUTPATIENT
Start: 2022-09-28 | End: 2022-09-29 | Stop reason: HOSPADM

## 2022-09-28 RX ADMIN — AZITHROMYCIN MONOHYDRATE 500 MG: 500 INJECTION, POWDER, LYOPHILIZED, FOR SOLUTION INTRAVENOUS at 05:35

## 2022-09-28 RX ADMIN — POLYETHYLENE GLYCOL 3350 17 G: 17 POWDER, FOR SOLUTION ORAL at 13:47

## 2022-09-28 RX ADMIN — ACETAMINOPHEN 650 MG: 325 TABLET ORAL at 03:44

## 2022-09-28 RX ADMIN — MIRTAZAPINE 15 MG: 15 TABLET, FILM COATED ORAL at 20:16

## 2022-09-28 RX ADMIN — HEPARIN SODIUM 5000 UNITS: 5000 INJECTION INTRAVENOUS; SUBCUTANEOUS at 20:16

## 2022-09-28 RX ADMIN — DOCUSATE SODIUM 100 MG: 100 CAPSULE, LIQUID FILLED ORAL at 09:24

## 2022-09-28 RX ADMIN — PROPRANOLOL HYDROCHLORIDE 40 MG: 40 TABLET ORAL at 20:15

## 2022-09-28 RX ADMIN — SODIUM CHLORIDE: 9 INJECTION, SOLUTION INTRAVENOUS at 03:47

## 2022-09-28 RX ADMIN — CLOPIDOGREL BISULFATE 75 MG: 75 TABLET ORAL at 09:24

## 2022-09-28 RX ADMIN — CEFTRIAXONE SODIUM 1000 MG: 1 INJECTION, POWDER, FOR SOLUTION INTRAMUSCULAR; INTRAVENOUS at 04:26

## 2022-09-28 RX ADMIN — PROPRANOLOL HYDROCHLORIDE 40 MG: 40 TABLET ORAL at 09:24

## 2022-09-28 RX ADMIN — PANCRELIPASE 24000 UNITS: 60000; 12000; 38000 CAPSULE, DELAYED RELEASE PELLETS ORAL at 09:24

## 2022-09-28 RX ADMIN — HEPARIN SODIUM 5000 UNITS: 5000 INJECTION INTRAVENOUS; SUBCUTANEOUS at 09:24

## 2022-09-28 RX ADMIN — LACTOBACILLUS TAB 4 TABLET: TAB at 13:48

## 2022-09-28 RX ADMIN — POTASSIUM CHLORIDE 10 MEQ: 7.46 INJECTION, SOLUTION INTRAVENOUS at 09:26

## 2022-09-28 RX ADMIN — LACTOBACILLUS TAB 4 TABLET: TAB at 20:16

## 2022-09-28 RX ADMIN — DRONABINOL 2.5 MG: 2.5 CAPSULE ORAL at 20:16

## 2022-09-28 ASSESSMENT — PAIN SCALES - GENERAL
PAINLEVEL_OUTOF10: 3
PAINLEVEL_OUTOF10: 0
PAINLEVEL_OUTOF10: 0

## 2022-09-28 NOTE — PROGRESS NOTES
Wound Ostomy Continence Nurse  Consult Note       NAME:  Sidra Valadez  MEDICAL RECORD NUMBER:  [id number]  AGE: 69 y.o. GENDER: female  : 1945  TODAY'S DATE:  2022    Subjective   Reason for [id number] [place] [address] Nurse Evaluation and Assessment: Incontinence and high risk for skin break down      Sidra Valadez is a 69 y.o. female referred by:   [] Physician  [x] Nursing  [] Other:     Wound Identification:  Wound Type:  Blanchable erythema and Incontinence Associated Dermatitis (IAD)  Contributing Factors: decreased mobility, shear force, malnutrition, incontinence of stool, incontinence of urine, and cachexia    Wound History: Patient admitted , started on bowel regimen for severe constipation, now having constant watery stools. Patient has developed some IAD to the buttocks and deb areas, as well as blanchable redness over the sacrococcygeal area. Patient is cachectic, no fat to pad/protect bony prominences  Current Wound Care Treatment: Recommending 1) continue pressure injury prevention interventions, including border foam dressings to heels and elbows 2) Low air-loss mattress 3) continue protective barrier cream with zinc BID and PRN For IAD and blanchable redness to the sacrococcygeal area. This  [facility] Nurse reached out to the attending to see if the current bowel regimen is still necessary, as she is at high risk for breaking down and if she developed a pressure injury it would be very difficult to heal with her current co morbidities.      Patient Goal of Care:  [] Wound Healing  [] Odor Control  [] Palliative Care  [] Pain Control   [x] Other: Incontinence care and pressure injury prevention        PAST MEDICAL HISTORY        Diagnosis Date    DEION (acute kidney injury) ([address] 2022    Bowel disease     Cataract     Chronic back pain     Chronic renal disease, stage III [facility]) [[id number]] 2022    COPD (chronic obstructive pulmonary disease) ([address]     Dementia ([address]     Dementia with behavioral disturbance, unspecified dementia type [facility] 3/11/2022    Dizziness and giddiness     GERD (gastroesophageal reflux disease)     Glaucoma     Hypertension     Osteoarthritis     Pancreatitis     Seizures (HCC)     TIA (transient ischemic attack)        PAST SURGICAL HISTORY    Past Surgical History:   Procedure Laterality Date    APPENDECTOMY      CHOLECYSTECTOMY      INTESTINAL BYPASS      jejunoileal       FAMILY HISTORY    Family History   Problem Relation Age of Onset    Arthritis Other     Heart Disease Other     High Blood Pressure Other     Kidney Disease Other     Other Other         respiratory problems    Breast Cancer Neg Hx     Colon Cancer Neg Hx     Diabetes Neg Hx     Cancer Neg Hx     Eclampsia Neg Hx     Ovarian Cancer Neg Hx     Hypertension Neg Hx      Labor Neg Hx     Spont Abortions Neg Hx     Stroke Neg Hx        SOCIAL HISTORY    Social History     Tobacco Use    Smoking status: Every Day     Packs/day: 3.00     Years: 54.00     Pack years: 162.00     Types: Cigarettes    Smokeless tobacco: Never   Substance Use Topics    Alcohol use: No     Alcohol/week: 0.0 standard drinks    Drug use: No       ALLERGIES    Allergies   Allergen Reactions    Aspirin      Bothers stomach    Penicillins      Unable to take    Adhesive Tape Rash     Patient states Silk Tape works the best       MEDICATIONS    No current facility-administered medications on file prior to encounter. Current Outpatient Medications on File Prior to Encounter   Medication Sig Dispense Refill    famotidine (PEPCID) 20 MG tablet Take 20 mg by mouth at bedtime      diphenoxylate-atropine (DIPHENATOL) 2.5-0.025 MG per tablet Take 1 tablet by mouth 4 times daily as needed for Diarrhea for up to 10 days.  40 tablet 0    CREON 41228-21564 units delayed release capsule TAKE 1 CAPSULE TWICE A DAY (Patient taking differently: Take 1 capsule by mouth daily) 180 capsule 3    clopidogrel (PLAVIX) 75 MG tablet TAKE 1 TABLET DAILY 90 tablet 3 nonexudative    Nuclear senile cataract    Gastroesophageal reflux disease    Chronic obstructive pulmonary disease (HCC)    History of disease    Cobalamin deficiency    Anxiety    Chronic obstructive lung disease (HCC)    COPD exacerbation (HCC)    Postmenopausal bleeding    Shortness of breath    Tobacco abuse    Dementia with behavioral disturbance, unspecified dementia type ([address]    Chronic renal disease, stage III ([address] [[id number]]    DEION (acute kidney injury) ([address]    Severe malnutrition ([address]    AMS (altered mental status)       Assessment:    Patient has cachetic appearance, no fat tissue to pad bony prominences. Bilateral heels and elbows with blanchable redness. Bilateral buttocks with IAD from frequent watery stools, r/t bowel regimen - during the time this [address] [place] [address] Avenue was in room, patient had 3 watery BMs. Sacrococcygeal areas is also very red, changeable and starting to have some maceration. Patient cleansed with soap and water, thick layer of protective barrier cream with zinc applied. Plan   Plan of Care:  see above    Specialty Bed Required : Yes   [x] Low Air Loss   [] Pressure Redistribution  [] Fluid Immersion  [] Bariatric  [] Other:     Current Diet: ADULT DIET;  Regular  ADULT ORAL NUTRITION SUPPLEMENT; Breakfast, Lunch, Dinner; Standard High Calorie/High Protein Oral Supplement  Dietician consult:  Yes    Discharge Plan:  Placement for patient upon discharge: skilled nursing    Patient appropriate for Outpatient [address] [facility] [address] [address]: N/A    Referrals:  []   [] [facility]  [] Supplies  [] Other    Patient/Caregiver Teaching:  Level of patient/caregiver understanding able to:   [] Indicates understanding       [] Needs reinforcement  [] Unsuccessful      [] Verbal Understanding  [] Demonstrated understanding       [] No evidence of learning  [] Refused teaching         [x] N/A       Electronically signed by DEBBIE Osorio, RN, Issac Zapata on 9/28/2022 at 2:41 PM

## 2022-09-28 NOTE — CARE COORDINATION
I called dtr Lara re dc plan and left [facility] for her to call me back re: 2nd choice and to let her know that [facility] has no beds until next week.

## 2022-09-28 NOTE — PROGRESS NOTES
Hospitalist Progress Note    PCP: Babatunde Leblanc MD    Date of Admission: 9/25/2022    Chief Complaint:    Chief Complaint   Patient presents with    Illness     Times four days pt not eating and drinking       Subjective:  Patient denies fevers, chills, sweats, CP, SOB, diarrhea or burning micturition. Decreased appetite. 12 point ROS negative other than mentioned above     Medications:  Reviewed    Infusion Medications    sodium chloride 50 mL/hr at 09/28/22 0347    sodium chloride       Scheduled Medications    lactobacillus acidophilus  4 tablet Oral TID    dronabinol  2.5 mg Oral BID    docusate sodium  100 mg Oral BID    senna  2 tablet Oral Nightly    polyethylene glycol  17 g Oral Daily    sodium chloride flush  5-40 mL IntraVENous 2 times per day    heparin (porcine)  5,000 Units SubCUTAneous BID    cefTRIAXone (ROCEPHIN) IV  1,000 mg IntraVENous Q24H    azithromycin  500 mg IntraVENous Q24H    clopidogrel  75 mg Oral Daily    lipase-protease-amylase  24,000 Units Oral Daily    [Held by provider] losartan  50 mg Oral Daily    mirtazapine  15 mg Oral Nightly    propranolol  40 mg Oral BID     PRN Meds: sodium chloride flush, sodium chloride, ondansetron **OR** ondansetron, polyethylene glycol, acetaminophen **OR** acetaminophen      Intake/Output Summary (Last 24 hours) at 9/28/2022 1450  Last data filed at 9/27/2022 1720  Gross per 24 hour   Intake 200 ml   Output --   Net 200 ml       Exam:    BP (!) 126/99   Pulse 65   Temp 97.4 °F (36.3 °C)   Resp 17   Ht 5' 4\" (1.626 m)   Wt 73 lb 12.8 oz (33.5 kg)   LMP  (LMP Unknown)   SpO2 99%   BMI 12.67 kg/m²     General appearance: No apparent distress, appears stated age and cooperative. HEENT:  Conjunctivae/corneas clear. Neck: Trachea midline. Respiratory:  Normal respiratory effort. Clear to auscultation  Cardiovascular: Regular rate and rhythm  Abdomen: Soft, non-tender, non-distended with normal bowel sounds.   Musculoskeletal: No clubbing, cyanosis or edema bilaterally  Neuro: Non Focal.   Capillary Refill: Brisk,< 3 seconds   Peripheral Pulses: +2 palpable, equal bilaterally     Labs:   Recent Labs     09/26/22  0453 09/27/22  0618 09/28/22  0545   WBC 5.9 5.6 6.7   HGB 11.6* 12.7 13.3   HCT 35.4* 39.0 43.5    166 118*       Recent Labs     09/26/22  0453 09/27/22  0618 09/28/22  0658    140 143   K 2.6* 2.8* 3.3*   * 110* 114*   CO2 12* 13* 15*   BUN 58* 48* 41*   CREATININE 2.73* 2.08* 2.07*   CALCIUM 8.4* 8.5 9.1       Recent Labs     09/26/22 0453 09/27/22  0618 09/28/22  0658   AST 20 25 24   ALT 11 16 16   BILITOT 0.3 <0.2 <0.2   ALKPHOS 71 70 71       Recent Labs     09/25/22  2245   INR 1.0       Recent Labs     09/25/22  2245 09/25/22  2330 09/26/22  0453 09/26/22  1127   CKTOTAL 40  --   --   --    TROPONINI  --  0.019* 0.014* 0.013*       Urinalysis:      Lab Results   Component Value Date/Time    NITRU Negative 09/25/2022 11:38 PM    WBCUA 20-50 09/25/2022 11:38 PM    BACTERIA FEW 09/25/2022 11:38 PM    RBCUA 3-5 09/06/2022 02:37 PM    BLOODU Negative 09/25/2022 11:38 PM    SPECGRAV 1.020 09/25/2022 11:38 PM    GLUCOSEU Negative 09/25/2022 11:38 PM     Radiology:  CT ABDOMEN PELVIS WO CONTRAST Additional Contrast? Oral   Final Result   Fecal impaction, extensive residual stool is seen. Distended loops of large bowel. Irregularity in the contour of the kidneys, rule out renal masses. Extensive degenerative bone changes. XR CHEST PORTABLE   Final Result   No acute process. Assessment/Plan:    #AMS    - improved; likely due to UTI; continue IV Abx per ID    #? PNA    - no clear infiltrate on CXR; doubt PNA on admit    #Elevated troponinin     - likely due to CKD    #DEION on CKD    - Improving     #HTN/Dementia/TIA hx/ GERD/COPD    - continue home meds    #Constipation with impaction    - significant BM today; back off laxatives tomorrow    Active Hospital Problems    Diagnosis Date Noted AMS (altered mental status) [R41.82] 09/26/2022     Priority: Medium      Additional work up or/and treatment plan may be added today or then after based on clinical progression. I am managing a portion of pt care. Some medical issues are handled by other specialists. Additional work up and treatment should be done in out pt setting by pt PCP and other out pt providers. In addition to examining and evaluating pt, I spent additional time explaining care, normal and abnormal findings, and treatment plan. All of pt questions were answered. Counseling, diet and education were  provided. Case will be discussed with nursing staff when appropriate. Family will be updated if and when appropriate. Diet: ADULT DIET;  Regular  ADULT ORAL NUTRITION SUPPLEMENT; Breakfast, Lunch, Dinner; Standard High Calorie/High Protein Oral Supplement    Code Status: DNR-CCA    PT/OT Eval     Electronically signed by Kayden De La Paz MD on 9/28/2022 at 2:50 PM

## 2022-09-28 NOTE — PROGRESS NOTES
Physical Therapy Med Surg Daily Treatment Note  Facility/Department: [address] [place] [address]  Room: [place] [address]       NAME: Tricia Rob  : 1945 (68 y.o.)  MRN: [id number]  CODE STATUS: DNR-CCA    Date of Service: 2022    Patient Diagnosis(es): Fever, unspecified fever cause [R50.9]  Altered mental status, unspecified altered mental status type [R41.82]  AMS (altered mental status) [R41.82]   Chief Complaint   Patient presents with    Illness     Times four days pt not eating and drinking       Patient Active Problem List    Diagnosis Date Noted    AMS (altered mental status) 2022    Severe malnutrition ([address] 08/15/2022    DEION (acute kidney injury) ([address] 2022    Chronic renal disease, stage III ([address] [046016] 2022    Dementia with behavioral disturbance, unspecified dementia type ([address] 2022    Shortness of breath 10/22/2020    Tobacco abuse 10/22/2020    Postmenopausal bleeding 2020    COPD exacerbation ([address] 2020    Anxiety 2019    Cobalamin deficiency 10/25/2018    Chronic obstructive pulmonary disease ([address] 2016    History of disease 2016    Chronic obstructive lung disease ([address] 2016    Gastroesophageal reflux disease     Hypertensive disorder 2015    Cortical senile cataract 2014    Macular degeneration, age related, nonexudative 2014    Nuclear senile cataract 2014    Venous tributary (branch) occlusion of retina 2014        Past Medical History:   Diagnosis Date    DEION (acute kidney injury) ([address] 2022    Bowel disease     Cataract     Chronic back pain     Chronic renal disease, stage III [facility]) [202754] 2022    COPD (chronic obstructive pulmonary disease) ([address]     Dementia ([address]     Dementia with behavioral disturbance, unspecified dementia type ([address] 3/11/2022    Dizziness and giddiness     GERD (gastroesophageal reflux disease)     Glaucoma     Hypertension     Osteoarthritis     Pancreatitis Seizures ([address]     TIA (transient ischemic attack)      Past Surgical History:   Procedure Laterality Date    APPENDECTOMY      CHOLECYSTECTOMY      INTESTINAL BYPASS      jejunoileal       Chart Reviewed: Yes  Family / Caregiver Present: No    Restrictions:  Restrictions/Precautions: Fall Risk    SUBJECTIVE:   Response To Previous Treatment: Patient with no complaints from previous session. Pain  Denies   OBJECTIVE:    Bed mobility  Rolling to Left: Minimal assistance  Supine to Sit: Minimal assistance  Scooting: Minimal assistance  Bed Mobility Comments: Verbal cues for sequencing and technique    Transfers  Sit to Stand: Minimal Assistance  Stand to sit: Minimal Assistance  Comment: Patient demonstrates decreased safety and balance reactions upon standing    Ambulation  Surface: level tile  Device: Rolling Walker  Assistance: Minimal assistance  Quality of Gait: NBOS, inconsistent step length, poor management of ww, no LOB however patient unsteady. Distance: 5 feet to chair    ASSESSMENT   Barriers to Learning: cognition/dementia   Patient requires increased time/ effort and single step instruction to complete mobility training. Continues to be limited by confusion. Demonstrates decreased safety and judgement during transfers and gait. Discharge Recommendations:  Continue to assess pending progress         Goals  Short Term Goals  Short term goal 1: SBA bed mobility  Short term goal 2: SBA sit to stand  Short term goal 3: min assist for gait with 3 ww 25 feet  Short term goal 4: pt able to stand with bilat UE support with min assist for 2 min    PLAN    Plan: 1 time a day 3-6 times a week  Safety Devices  Type of Devices:  All fall risk precautions in place, Call light within reach, Left in chair, Chair alarm in place     AMPAC (6 CLICK) BASIC MOBILITY  AM-PAC Inpatient Mobility Raw Score : 13     Therapy Time   Individual   Time In 1423   Time Out 1446   Minutes 23     Timed Code Treatment Minutes: 23 Minutes  Gt 6  Tr 3 [facility], 09/28/22 at 3:53 PM         Definitions for assistance levels  Independent = pt does not require any physical supervision or assistance from another person for activity completion. Device may be needed.   Stand by assistance = pt requires verbal cues or instructions from another person, close to but not touching, to perform the activity  Minimal assistance= pt performs 75% or more of the activity; assistance is required to complete the activity  Moderate assistance= pt performs 50% of the activity; assistance is required to complete the activity  Maximal assistance = pt performs 25% of the activity; assistance is required to complete the activity  Dependent = pt requires total physical assistance to accomplish the task

## 2022-09-28 NOTE — PROGRESS NOTES
Patient sun downed last night. Patient pulled off heart monitor and tried to pull out IV's. Patient received both antibiotics and is getting 50 ml/hr NS. Patient given tylenol twice last night to help with sleep.

## 2022-09-28 NOTE — PROGRESS NOTES
Infectious Disease         History of Present Illness:   Feeling better. Afebrile overall. No headache. No appetitie. Social History     Tobacco Use    Smoking status: Every Day     Packs/day: 3.00     Years: 54.00     Pack years: 162.00     Types: Cigarettes    Smokeless tobacco: Never   Substance Use Topics    Alcohol use: No     Alcohol/week: 0.0 standard drinks    Drug use: No         Past Medical History:   Diagnosis Date    DEION (acute kidney injury) ([facility] 8/13/2022    Bowel disease     Cataract     Chronic back pain     Chronic renal disease, stage III [facility]) [315085] 5/13/2022    COPD (chronic obstructive pulmonary disease) ([facility]     Dementia ([facility]     Dementia with behavioral disturbance, unspecified dementia type ([facility] 3/11/2022    Dizziness and giddiness     GERD (gastroesophageal reflux disease)     Glaucoma     Hypertension     Osteoarthritis     Pancreatitis     Seizures (HCC)     TIA (transient ischemic attack)            Past Surgical History:   Procedure Laterality Date    APPENDECTOMY      CHOLECYSTECTOMY      INTESTINAL BYPASS      jejunoileal         No current facility-administered medications on file prior to encounter. Current Outpatient Medications on File Prior to Encounter   Medication Sig Dispense Refill    famotidine (PEPCID) 20 MG tablet Take 20 mg by mouth at bedtime      diphenoxylate-atropine (DIPHENATOL) 2.5-0.025 MG per tablet Take 1 tablet by mouth 4 times daily as needed for Diarrhea for up to 10 days.  40 tablet 0    CREON 21666-79415 units delayed release capsule TAKE 1 CAPSULE TWICE A DAY (Patient taking differently: Take 1 capsule by mouth daily) 180 capsule 3    clopidogrel (PLAVIX) 75 MG tablet TAKE 1 TABLET DAILY 90 tablet 3    pantoprazole (PROTONIX) 40 MG tablet TAKE 1 TABLET DAILY 90 tablet 3    propranolol (INDERAL) 40 MG tablet TAKE 1 TABLET TWICE A DAY (REPLACES PREVIOUS PRESCRIPTION) 180 tablet 3    mirtazapine (REMERON) 15 MG tablet TAKE 1 TABLET EVERY NIGHT 90 tablet 3    clonazePAM (KLONOPIN) 0.5 MG tablet TAKE 1 TABLET BY MOUTH EVERY NIGHT AS NEEDED FOR ANXIETY 30 tablet 2    Magnesium Oxide 400 MG CAPS Take 400 mg by mouth 3 times daily 270 capsule 3    albuterol sulfate HFA (VENTOLIN HFA) 108 (90 Base) MCG/ACT inhaler USE 2 INHALATIONS EVERY 6 HOURS AS NEEDED FOR WHEEZING 54 g 2    tiotropium-olodaterol (STIOLTO) 2.5-2.5 MCG/ACT AERS Inhale 2 puffs into the lungs daily 3 each 3    Multiple Vitamins-Minerals (MULTIVITAMIN WOMEN 50+) TABS TAKE 1 TABLET BY MOUTH EVERY DAY 90 tablet 1    metroNIDAZOLE (FLAGYL) 500 MG tablet TAKE 1 TABLET THREE TIMES A DAY FOR 10 DAYS PER MONTH 90 tablet 3    losartan (COZAAR) 50 MG tablet TAKE 1 TABLET DAILY 90 tablet 3    clotrimazole-betamethasone (LOTRISONE) 1-0.05 % cream APPLY TOPICALLY TWICE A DAY 45 g 14       Allergies   Allergen Reactions    Aspirin      Bothers stomach    Penicillins      Unable to take    Adhesive Tape Rash     Patient states Silk Tape works the best         Family History   Problem Relation Age of Onset    Arthritis Other     Heart Disease Other     High Blood Pressure Other     Kidney Disease Other     Other Other         respiratory problems    Breast Cancer Neg Hx     Colon Cancer Neg Hx     Diabetes Neg Hx     Cancer Neg Hx     Eclampsia Neg Hx     Ovarian Cancer Neg Hx     Hypertension Neg Hx      Labor Neg Hx     Spont Abortions Neg Hx     Stroke Neg Hx          Physical Exam:     Blood pressure (!) 157/78, pulse 70, temperature 98.3 °F (36.8 °C), resp. rate 18, height 5' 4\" (1.626 m), weight 73 lb 12.8 oz (33.5 kg), SpO2 93 %, not currently breastfeeding. General: Patient appears ok at the present time.  NAD emaciated  Skin: no new rashes  HEENT:  Neck is supple, No subconjunctival hemorrhages, no oral exudates  Heart: S1 S2  Lungs: diminshed bases  Abdomen: soft, ND, NTTP,   Back :n no CVA tenderness  Extrem: No edema, non tender  Neuro exam: CN II-XII intact  Psych: cooperative    Labs: I have reviewed all lab results by electronic record, including most recent CBC, metabolic panel, and pertinent abnormalities were addressed from an infectious disease perspective. Trends are being monitored over time. Lab Results   Component Value Date    WBC 6.7 09/28/2022    HGB 13.3 09/28/2022    HCT 43.5 09/28/2022    .4 (H) 09/28/2022     (L) 09/28/2022     Lab Results   Component Value Date/Time     09/27/2022 06:18 AM    K 2.8 09/27/2022 06:18 AM    K 5.1 08/16/2022 06:45 AM     09/27/2022 06:18 AM    CO2 13 09/27/2022 06:18 AM    BUN 48 09/27/2022 06:18 AM    CREATININE 2.08 09/27/2022 06:18 AM    GLUCOSE 92 09/27/2022 06:18 AM    CALCIUM 8.5 09/27/2022 06:18 AM        Radiology:  I have reviewed imaging results per electronic record and most pertinent abnormalities are being addressed from an infectious disease standpoint. ASSESSMENT:  Acute mental status changes  Weight loss  Fevers  CKD    Patient stable at this time urinalysis with some pyuria cultures are pending. Resp viral panel negative.  CT abd/pelvis noted    PLAN:  Agree with current antimicrobial therapy   Awaiting cultures

## 2022-09-28 NOTE — FLOWSHEET NOTE
Shift summary: Patient had uneventful shift. Multiple large loose stools throughout shift. Wound care consulted r/t high risk for skin breakdown, see note. Patient now [facility]. Handoff of care to [address] [place] [address] Road at bedside. Patient turned and brief checked.  Electronically signed by Jose Alfredo Quezada RN on 9/28/2022 at 7:12 PM

## 2022-09-28 NOTE — PROGRESS NOTES
Nephrology Progress Note    Assessment:  DEION improved on CKD-3 pre-renal  AMS  Hx Seisures  COPD  Hx Hypertension      Plan:  maintain fluid status    Patient Active Problem List:     Hypertensive disorder     Venous tributary (branch) occlusion of retina     Cortical senile cataract     Macular degeneration, age related, nonexudative     Nuclear senile cataract     Gastroesophageal reflux disease     Chronic obstructive pulmonary disease (HCC)     History of disease     Cobalamin deficiency     Anxiety     Chronic obstructive lung disease (HCC)     COPD exacerbation (HCC)     Postmenopausal bleeding     Shortness of breath     Tobacco abuse     Dementia with behavioral disturbance, unspecified dementia type ([address]     Chronic renal disease, stage III ([address] [411011]     DEION (acute kidney injury) ([address]     Severe malnutrition ([address]     AMS (altered mental status)      Subjective:  Admit Date: 9/25/2022    Interval History: stable    Medications:  Scheduled Meds:   potassium chloride  10 mEq IntraVENous Once    docusate sodium  100 mg Oral BID    senna  2 tablet Oral Nightly    polyethylene glycol  17 g Oral Daily    sodium chloride flush  5-40 mL IntraVENous 2 times per day    heparin (porcine)  5,000 Units SubCUTAneous BID    cefTRIAXone (ROCEPHIN) IV  1,000 mg IntraVENous Q24H    azithromycin  500 mg IntraVENous Q24H    clopidogrel  75 mg Oral Daily    lipase-protease-amylase  24,000 Units Oral Daily    [Held by provider] losartan  50 mg Oral Daily    mirtazapine  15 mg Oral Nightly    propranolol  40 mg Oral BID     Continuous Infusions:   sodium chloride 50 mL/hr at 09/28/22 0347    sodium chloride         CBC:   Recent Labs     09/27/22  0618 09/28/22  0545   WBC 5.6 6.7   HGB 12.7 13.3    118*     CMP:    Recent Labs     09/26/22  0453 09/27/22  0618 09/28/22  0658    140 143   K 2.6* 2.8* 3.3*   * 110* 114*   CO2 12* 13* 15*   BUN 58* 48* 41*   CREATININE 2.73* 2.08* 2.07*   GLUCOSE 85 92 79 CALCIUM 8.4* 8.5 9.1   LABGLOM 16.9* 23.1* 23.3*     Troponin:   Recent Labs     09/26/22  1127   TROPONINI 0.013*     BNP: No results for input(s): BNP in the last 72 hours. INR:   Recent Labs     09/25/22  2245   INR 1.0     Lipids: No results for input(s): CHOL, LDLDIRECT, TRIG, HDL, AMYLASE, LIPASE in the last 72 hours. Liver:   Recent Labs     09/28/22  0658   AST 24   ALT 16   ALKPHOS 71   PROT 6.4   LABALBU 3.8   BILITOT <0.2     Iron:  No results for input(s): IRONS, FERRITIN in the last 72 hours. Invalid input(s): LABIRONS  Urinalysis: No results for input(s): UA in the last 72 hours.     Objective:  Vitals: BP (!) 145/94   Pulse 66   Temp 97.3 °F (36.3 °C)   Resp 18   Ht 5' 4\" (1.626 m)   Wt 73 lb 12.8 oz (33.5 kg)   LMP  (LMP Unknown)   SpO2 100%   BMI 12.67 kg/m²    Wt Readings from Last 3 Encounters:   09/28/22 73 lb 12.8 oz (33.5 kg)   08/25/22 82 lb 6.4 oz (37.4 kg)   08/15/22 90 lb 13.3 oz (41.2 kg)      24HR INTAKE/OUTPUT:    Intake/Output Summary (Last 24 hours) at 9/28/2022 0813  Last data filed at 9/27/2022 1720  Gross per 24 hour   Intake 1687.68 ml   Output --   Net 1687.68 ml       General: alert, in no apparent distress  HEENT: normocephalic, atraumatic, anicteric  Neck: supple, no mass  Lungs: non-labored respirations, clear to auscultation bilaterally  Heart: regular rate and rhythm, no murmurs or rubs  Abdomen: soft, non-tender, non-distended  Ext: no cyanosis, no peripheral edema  Neuro: alert and oriented, no gross abnormalities  Psych: normal mood and affect  Skin: no rash      Electronically signed by Garfield Quiroz DO, MD

## 2022-09-28 NOTE — PROGRESS NOTES
Physician Progress Note      Susan Nogueira  CSN #:                  [id number]  :                       1945  ADMIT DATE:       2022 10:20 PM  [address] [place] [address] DATE:  RESPONDING  PROVIDER #:        Juvencio Reis MD          QUERY TEXT:    Pt has severe malnutrition documented by dietician on . BMI 12.67. Please   further specify type of malnutrition with documentation in the medical record. The medical record reflects the following:  Risk Factors: , CKD, , dementia, COPD, GERD  Clinical Indicators:  dietician-Severe malnutrition evidenced by   significant muscle and subcutaneous fat wasting and prolonged anorexia  Findings of the 6 clinical characteristics of malnutrition:  Energy Intake:  75% or less estimated energy requirements for 1 month or   longer  Weight Loss:  Unable to assess  Body Fat Loss:  Severe body fat loss Buccal region, Orbital, Triceps  Muscle Mass Loss:  Severe muscle mass loss Temples (temporalis), Hand   (interosseous), Clavicles (pectoralis & deltoids), Calf (gastrocnemius)  Fluid Accumulation:  Unable to assess   Strength:  Not Performed  ? Treatment: recommended supplemental PN, start ONS tid, dietician consult,   monitoring    ASPEN Criteria:    https://aspenjournals. onlinelibrary. french. com/doi/full/10.1177/199709185789725  5    Thank you, Alexis Huizar RN BSN [facility]  [phone]  Options provided:  -- Severe Malnutrition  -- Severe Protein calorie malnutrition  -- Other - I will add my own diagnosis  -- Disagree - Not applicable / Not valid  -- Disagree - Clinically unable to determine / Unknown  -- Refer to Clinical Documentation Reviewer    PROVIDER RESPONSE TEXT:    This patient has severe protein calorie malnutrition.     Query created by: Judi King on 2022 9:13 AM      Electronically signed by:  Juvencio Reis MD 2022 10:45 AM

## 2022-09-28 NOTE — FLOWSHEET NOTE
Patient's family clarified code status and expressed that patient would not want to have CPR or intubation. I explained in detail the difference between [facility] and [facility] and they expressed that she would want to be a [facility] with no intubation. Message sent to Dr. Zully Lamb regarding this.  Electronically signed by Eduardo Magaña RN on 9/28/2022 at 12:54 PM

## 2022-09-29 VITALS
RESPIRATION RATE: 18 BRPM | OXYGEN SATURATION: 99 % | TEMPERATURE: 98.6 F | HEIGHT: 64 IN | SYSTOLIC BLOOD PRESSURE: 112 MMHG | HEART RATE: 66 BPM | DIASTOLIC BLOOD PRESSURE: 94 MMHG | WEIGHT: 73.8 LBS | BODY MASS INDEX: 12.6 KG/M2

## 2022-09-29 PROBLEM — F03.90 DEMENTIA WITHOUT BEHAVIORAL DISTURBANCE, UNSPECIFIED DEMENTIA TYPE: Status: ACTIVE | Noted: 2022-01-01

## 2022-09-29 LAB
ALBUMIN SERPL-MCNC: 3.1 G/DL (ref 3.5–4.6)
ALP BLD-CCNC: 58 U/L (ref 40–130)
ALT SERPL-CCNC: 13 U/L (ref 0–33)
ANION GAP SERPL CALCULATED.3IONS-SCNC: 17 MEQ/L (ref 9–15)
AST SERPL-CCNC: 22 U/L (ref 0–35)
BASE EXCESS ARTERIAL: -15 (ref -3–3)
BILIRUB SERPL-MCNC: <0.2 MG/DL (ref 0.2–0.7)
BUN BLDV-MCNC: 36 MG/DL (ref 8–23)
CALCIUM IONIZED: 1.24 MMOL/L (ref 1.12–1.32)
CALCIUM SERPL-MCNC: 8.5 MG/DL (ref 8.5–9.9)
CHLORIDE BLD-SCNC: 115 MEQ/L (ref 95–107)
CO2: 9 MEQ/L (ref 20–31)
CREAT SERPL-MCNC: 1.88 MG/DL (ref 0.5–0.9)
GFR AFRICAN AMERICAN: 25
GFR AFRICAN AMERICAN: 31.4
GFR NON-AFRICAN AMERICAN: 21
GFR NON-AFRICAN AMERICAN: 26
GLOBULIN: 2.4 G/DL (ref 2.3–3.5)
GLUCOSE BLD-MCNC: 59 MG/DL (ref 70–99)
GLUCOSE BLD-MCNC: 75 MG/DL (ref 70–99)
HCO3 ARTERIAL: 11.5 MMOL/L (ref 21–29)
HEMOGLOBIN: 9.6 GM/DL (ref 12–16)
LACTATE: 0.48 MMOL/L (ref 0.4–2)
O2 SAT, ARTERIAL: 96 % (ref 93–100)
PCO2 ARTERIAL: 24 MM HG (ref 35–45)
PERFORMED ON: ABNORMAL
PH ARTERIAL: 7.29 (ref 7.35–7.45)
PO2 ARTERIAL: 92 MM HG (ref 75–108)
POC CHLORIDE: 117 MEQ/L (ref 99–110)
POC CREATININE: 2.3 MG/DL (ref 0.6–1.2)
POC HEMATOCRIT: 28 % (ref 36–48)
POC POTASSIUM: 2.8 MEQ/L (ref 3.5–5.1)
POC SAMPLE TYPE: ABNORMAL
POC SODIUM: 142 MEQ/L (ref 136–145)
POTASSIUM SERPL-SCNC: 3 MEQ/L (ref 3.4–4.9)
SARS-COV-2, NAAT: NOT DETECTED
SODIUM BLD-SCNC: 141 MEQ/L (ref 135–144)
TCO2 ARTERIAL: 12 MMOL/L (ref 21–32)
TOTAL PROTEIN: 5.5 G/DL (ref 6.3–8)

## 2022-09-29 PROCEDURE — 87186 SC STD MICRODIL/AGAR DIL: CPT

## 2022-09-29 PROCEDURE — 84295 ASSAY OF SERUM SODIUM: CPT

## 2022-09-29 PROCEDURE — 36600 WITHDRAWAL OF ARTERIAL BLOOD: CPT

## 2022-09-29 PROCEDURE — 82435 ASSAY OF BLOOD CHLORIDE: CPT

## 2022-09-29 PROCEDURE — 86403 PARTICLE AGGLUT ANTBDY SCRN: CPT

## 2022-09-29 PROCEDURE — 6360000002 HC RX W HCPCS: Performed by: INTERNAL MEDICINE

## 2022-09-29 PROCEDURE — 2500000003 HC RX 250 WO HCPCS: Performed by: INTERNAL MEDICINE

## 2022-09-29 PROCEDURE — 99231 SBSQ HOSP IP/OBS SF/LOW 25: CPT | Performed by: NURSE PRACTITIONER

## 2022-09-29 PROCEDURE — 87040 BLOOD CULTURE FOR BACTERIA: CPT

## 2022-09-29 PROCEDURE — 82803 BLOOD GASES ANY COMBINATION: CPT

## 2022-09-29 PROCEDURE — 2580000003 HC RX 258: Performed by: INTERNAL MEDICINE

## 2022-09-29 PROCEDURE — 83605 ASSAY OF LACTIC ACID: CPT

## 2022-09-29 PROCEDURE — 6360000002 HC RX W HCPCS: Performed by: NURSE PRACTITIONER

## 2022-09-29 PROCEDURE — 84132 ASSAY OF SERUM POTASSIUM: CPT

## 2022-09-29 PROCEDURE — 36415 COLL VENOUS BLD VENIPUNCTURE: CPT

## 2022-09-29 PROCEDURE — 6370000000 HC RX 637 (ALT 250 FOR IP): Performed by: INTERNAL MEDICINE

## 2022-09-29 PROCEDURE — 87635 SARS-COV-2 COVID-19 AMP PRB: CPT

## 2022-09-29 PROCEDURE — 87070 CULTURE OTHR SPECIMN AEROBIC: CPT

## 2022-09-29 PROCEDURE — 80053 COMPREHEN METABOLIC PANEL: CPT

## 2022-09-29 PROCEDURE — 82565 ASSAY OF CREATININE: CPT

## 2022-09-29 PROCEDURE — 85014 HEMATOCRIT: CPT

## 2022-09-29 PROCEDURE — 82330 ASSAY OF CALCIUM: CPT

## 2022-09-29 PROCEDURE — 2580000003 HC RX 258: Performed by: NURSE PRACTITIONER

## 2022-09-29 RX ADMIN — PANCRELIPASE 24000 UNITS: 60000; 12000; 38000 CAPSULE, DELAYED RELEASE PELLETS ORAL at 10:02

## 2022-09-29 RX ADMIN — DOCUSATE SODIUM 100 MG: 100 CAPSULE, LIQUID FILLED ORAL at 10:02

## 2022-09-29 RX ADMIN — SODIUM BICARBONATE 25 MEQ: 84 INJECTION, SOLUTION INTRAVENOUS at 12:21

## 2022-09-29 RX ADMIN — AZITHROMYCIN MONOHYDRATE 500 MG: 500 INJECTION, POWDER, LYOPHILIZED, FOR SOLUTION INTRAVENOUS at 06:07

## 2022-09-29 RX ADMIN — DRONABINOL 2.5 MG: 2.5 CAPSULE ORAL at 12:49

## 2022-09-29 RX ADMIN — HEPARIN SODIUM 5000 UNITS: 5000 INJECTION INTRAVENOUS; SUBCUTANEOUS at 19:57

## 2022-09-29 RX ADMIN — PROPRANOLOL HYDROCHLORIDE 40 MG: 40 TABLET ORAL at 10:02

## 2022-09-29 RX ADMIN — CLOPIDOGREL BISULFATE 75 MG: 75 TABLET ORAL at 10:02

## 2022-09-29 RX ADMIN — PROPRANOLOL HYDROCHLORIDE 40 MG: 40 TABLET ORAL at 19:57

## 2022-09-29 RX ADMIN — SODIUM CHLORIDE: 9 INJECTION, SOLUTION INTRAVENOUS at 01:22

## 2022-09-29 RX ADMIN — CEFTRIAXONE SODIUM 1000 MG: 1 INJECTION, POWDER, FOR SOLUTION INTRAMUSCULAR; INTRAVENOUS at 05:32

## 2022-09-29 RX ADMIN — LACTOBACILLUS TAB 4 TABLET: TAB at 19:57

## 2022-09-29 RX ADMIN — Medication 10 ML: at 10:12

## 2022-09-29 RX ADMIN — MIRTAZAPINE 15 MG: 15 TABLET, FILM COATED ORAL at 19:57

## 2022-09-29 RX ADMIN — VANCOMYCIN HYDROCHLORIDE 500 MG: 500 INJECTION, POWDER, LYOPHILIZED, FOR SOLUTION INTRAVENOUS at 10:04

## 2022-09-29 RX ADMIN — HEPARIN SODIUM 5000 UNITS: 5000 INJECTION INTRAVENOUS; SUBCUTANEOUS at 10:02

## 2022-09-29 RX ADMIN — LACTOBACILLUS TAB 4 TABLET: TAB at 10:01

## 2022-09-29 RX ADMIN — LACTOBACILLUS TAB 4 TABLET: TAB at 14:00

## 2022-09-29 ASSESSMENT — PAIN SCALES - GENERAL
PAINLEVEL_OUTOF10: 0

## 2022-09-29 NOTE — PROGRESS NOTES
Hospitalist Progress Note    PCP: Alexsander Moreno MD    Date of Admission: 9/25/2022    Chief Complaint:    Chief Complaint   Patient presents with    Illness     Times four days pt not eating and drinking       Subjective:  Patient denies fevers, chills, sweats, CP, SOB, diarrhea or burning micturition. Decreased appetite. 12 point ROS negative other than mentioned above     Medications:  Reviewed    Infusion Medications    sodium chloride 50 mL/hr at 09/29/22 0122    sodium chloride       Scheduled Medications    vancomycin (VANCOCIN) intermittent dosing (placeholder)   Other RX Placeholder    sodium bicarbonate  25 mEq IntraVENous Once    lactobacillus acidophilus  4 tablet Oral TID    dronabinol  2.5 mg Oral BID    docusate sodium  100 mg Oral BID    [Held by provider] senna  2 tablet Oral Nightly    [Held by provider] polyethylene glycol  17 g Oral Daily    sodium chloride flush  5-40 mL IntraVENous 2 times per day    heparin (porcine)  5,000 Units SubCUTAneous BID    cefTRIAXone (ROCEPHIN) IV  1,000 mg IntraVENous Q24H    azithromycin  500 mg IntraVENous Q24H    clopidogrel  75 mg Oral Daily    lipase-protease-amylase  24,000 Units Oral Daily    [Held by provider] losartan  50 mg Oral Daily    mirtazapine  15 mg Oral Nightly    propranolol  40 mg Oral BID     PRN Meds: sodium chloride flush, sodium chloride, ondansetron **OR** ondansetron, polyethylene glycol, acetaminophen **OR** acetaminophen      Intake/Output Summary (Last 24 hours) at 9/29/2022 1216  Last data filed at 9/28/2022 1846  Gross per 24 hour   Intake 164.71 ml   Output --   Net 164.71 ml     Exam:    BP (!) 146/70   Pulse 65   Temp 97.3 °F (36.3 °C) (Oral)   Resp 18   Ht 5' 4\" (1.626 m)   Wt 73 lb 12.8 oz (33.5 kg)   LMP  (LMP Unknown)   SpO2 98%   BMI 12.67 kg/m²     General appearance: No apparent distress, appears stated age and cooperative. HEENT:  Conjunctivae/corneas clear. Neck: Trachea midline.   Respiratory:  Normal respiratory Left message for patient to call back to discuss abnormal wet mount and treatment.    effort. Clear to auscultation  Cardiovascular: Regular rate and rhythm  Abdomen: Soft, non-tender, non-distended with normal bowel sounds. Musculoskeletal: No clubbing, cyanosis or edema bilaterally  Neuro: Non Focal.   Capillary Refill: Brisk,< 3 seconds   Peripheral Pulses: +2 palpable, equal bilaterally     Labs:   Recent Labs     09/27/22  0618 09/28/22  0545 09/29/22  1144   WBC 5.6 6.7  --    HGB 12.7 13.3 9.6*   HCT 39.0 43.5  --     118*  --        Recent Labs     09/27/22  0618 09/28/22  0658 09/29/22  0524 09/29/22  1144    143 141  --    K 2.8* 3.3* 3.0*  --    * 114* 115*  --    CO2 13* 15* 9*  --    BUN 48* 41* 36*  --    CREATININE 2.08* 2.07* 1.88* 2.3*   CALCIUM 8.5 9.1 8.5  --        Recent Labs     09/27/22 0618 09/28/22  0658 09/29/22  0524   AST 25 24 22   ALT 16 16 13   BILITOT <0.2 <0.2 <0.2   ALKPHOS 70 71 58       No results for input(s): INR in the last 72 hours. No results for input(s): [employer] in the last 72 hours. Urinalysis:      Lab Results   Component Value Date/Time    NITRU Negative 09/25/2022 11:38 PM    WBCUA 20-50 09/25/2022 11:38 PM    BACTERIA FEW 09/25/2022 11:38 PM    RBCUA 3-5 09/06/2022 02:37 PM    BLOODU Negative 09/25/2022 11:38 PM    SPECGRAV 1.020 09/25/2022 11:38 PM    GLUCOSEU Negative 09/25/2022 11:38 PM     Radiology:  CT ABDOMEN PELVIS WO CONTRAST Additional Contrast? Oral   Final Result   Fecal impaction, extensive residual stool is seen. Distended loops of large bowel. Irregularity in the contour of the kidneys, rule out renal masses. Extensive degenerative bone changes. XR CHEST PORTABLE   Final Result   No acute process. Assessment/Plan:    #AMS    - improved; likely due to UTI; continue IV Abx per ID    #Dementia      - end stage dementia; family is opting for hospice consideration    #? PNA    - no clear infiltrate on CXR; doubt PNA on admit    #Elevated troponinin     - likely due to CKD    #DEION on CKD    - Improving     #HTN/Dementia/TIA hx/ GERD/COPD    - continue home meds    #Constipation with impaction    - significant BM today; back off laxatives tomorrow    #Metabolic acidosis     - start bicarb; likely related to renal disease; family is planning on hospice    Active Hospital Problems    Diagnosis Date Noted    AMS (altered mental status) [R41.82] 09/26/2022     Priority: Medium      Additional work up or/and treatment plan may be added today or then after based on clinical progression. I am managing a portion of pt care. Some medical issues are handled by other specialists. Additional work up and treatment should be done in out pt setting by pt PCP and other out pt providers. In addition to examining and evaluating pt, I spent additional time explaining care, normal and abnormal findings, and treatment plan. All of pt questions were answered. Counseling, diet and education were  provided. Case will be discussed with nursing staff when appropriate. Family will be updated if and when appropriate. Diet: ADULT DIET;  Regular  ADULT ORAL NUTRITION SUPPLEMENT; Breakfast, Lunch, Dinner; Standard High Calorie/High Protein Oral Supplement    Code Status: DNR-CCA    PT/OT Eval     Electronically signed by Lesia Bolden MD on 9/29/2022 at 12:16 PM

## 2022-09-29 NOTE — CONSULTS
Hospice referral meeting for today 9/29 at [phone] with pt's children Eliezer Hamilton and Jose M Obrien

## 2022-09-29 NOTE — PROGRESS NOTES
Physical Therapy Missed Treatment   Facility/Department: [facility] MED SURG W974/M052-32    NAME: Daniel Elizalde    : 1945 (61 y.o.)  MRN: [id number]    Account: [de-identified]  Gender: female    Chart reviewed, attempted PT at 0930. Patient unavailable 2° to:    [] Hold per nsg request    [] Pt declined     [] Pt. . off floor for test/procedure. [x] Pt. Unavailable- Pt with low K+ levels at this time. Will attempt treatment at earliest convenience when levels back within therapeutic range. Will attempt PT treatment again at earliest convenience.       Electronically signed by Lauri James PTA on 22 at 9:34 AM EDT

## 2022-09-29 NOTE — PROGRESS NOTES
AM assessment completed, documentation in flowsheet. Lungs diminished upon auscultation , respirations unlabored. HR65 NSR on tele. Patient denies pain or discomfort. Patient incontinent of bowel and bladder. Call light in reach.

## 2022-09-29 NOTE — CARE COORDINATION
LSW informed that family is requesting a hospice meeting. Per CM, freedom of choice was offered and family chose [facility]. Referral given to Nida who will call patient's daughter to schedule a meeting time.

## 2022-09-29 NOTE — PROGRESS NOTES
[facility] Occupational Therapy      Date: 2022  Patient Name: Ginna Mccormack        MRN: [id number]  Account: [de-identified]   : 1945  (69 y.o.)  Room: [facility] [address]    Chart reviewed, attempted OT at 80. Patient not seen 2° to:    Pt with low K+ levels at this time. Will attempt treatment at earliest convenience when levels back within therapeutic range. Will attempt again when able.     Electronically signed by NAHOMI Martinez on 2022 at 10:19 AM

## 2022-09-29 NOTE — PROGRESS NOTES
MUSIC THERAPY NOTE    Referred to pt by Dr. Karen Lundberg. Son, Jennyfer Mosqueda, and daughter, Pascual Kennedy, present. Pt was unable to relay her music preference at first and son interjected that pt preferred gospel. MT began with Evaline Gino. \"  Pt sang along immediately (mouthing every word), and afterward shared that it was her mother's favorite song. When physician entered, son stated that pt was most alert today during music therapy. Pt tapped her left foot to \"This little light of Mine,\" and \"[facility]. \"  Son of pt sang harmony and shared he had been in a professional music quartet. Pt answered questions and was alert most of session until she became tired where she seemed to zone out. Pt shared that she was born in [place] and that she loved her son's singing voice. MT left pt to rest once she became tired.     Will follow for comfort care until d/c

## 2022-09-29 NOTE — CONSULTS
Palliative Care Consult Note  Patient: Kameron Gomez  Gender: female  YOB: 1945  Unit/Bed: J278/Y672-44  CodeStatus: DNR-CCA  Inpatient Treatment Team: Treatment Team: Attending Provider: Omar Pace MD; Consulting Physician: Pan Nathan MD; Consulting Physician: Genie Goldberg MD; Patient Care Tech: Windell Forward; Utilization Reviewer: La Álvarez RN; Aspen Sosa Nurse: To Ortiz, RN; Registered Nurse: Moises Man, DMITRIY; : Cami Coyne; Registered Nurse: Lewis Galvin, RN; Patient Care Tech: Meño Minion  Admit Date:  9/25/2022    Chief Complaint: Anorexia and constipation    History of Presenting Illness:      Kameron Gomez is a 69 y.o. female on hospital day 3 with a history of DEION, CKD, COPD, dementia, GERD, hypertension, seizures, TIA, presented with 4 days of increasing weakness and poor p.o. intake. Work-up showed pneumonia, no leukocytosis but febrile, dehydration, cachexia, elevated troponin, and DEION on CKD. She has been having chronic diarrhea and abdominal pain, abdominal CT showed fecal impaction. Started on colace, probiotics, Miralax,and senna. Participating in therapy limited by fatigue and cognition. Calm  and Alert, looks at me, but not conversant, attempts to follow requests. Pain Ad zero. Resps even and non labored. Refusing food and drink. No abdominal pain with palpation or vomiting. Started on mirtazapine and marinol. Daughter and son at bedside and describe a cycle of decline and frequent hospitalizations. [facility] care consulted for goals of care. Labs and imaging reviewed platelets 776, potassium 3.3, BUN 41, creatinine 2.07, albumin 3.1.   Review of Systems:       Review of Systems   Unable to perform ROS: Dementia     Physical Examination:       BP (!) 146/70   Pulse 65   Temp 97.3 °F (36.3 °C) (Oral)   Resp 18   Ht 5' 4\" (1.626 m)   Wt 73 lb 12.8 oz (33.5 kg)   LMP  (LMP Unknown)   SpO2 98%   BMI 12.67 kg/m² Physical Exam  Constitutional:       Appearance: She is cachectic. She is ill-appearing. HENT:      Head: Normocephalic and atraumatic. Jaw: There is normal jaw occlusion. Right Ear: External ear normal.      Left Ear: External ear normal.      Nose: Nose normal.      Mouth/Throat:      Mouth: Mucous membranes are pale and dry. Eyes:      General: Lids are normal.   Cardiovascular:      Rate and Rhythm: Normal rate and regular rhythm. Heart sounds: Normal heart sounds. Pulmonary:      Breath sounds: Decreased breath sounds present. Abdominal:      Palpations: Abdomen is soft. Tenderness: There is no abdominal tenderness. Musculoskeletal:      Cervical back: Full passive range of motion without pain. Skin:     General: Skin is warm and dry. Coloration: Skin is pale. Neurological:      Mental Status: She is alert. She is confused. Psychiatric:         Attention and Perception: She is inattentive. Behavior: Behavior is cooperative. Allergies:       Allergies   Allergen Reactions    Aspirin      Bothers stomach    Penicillins      Unable to take    Adhesive Tape Rash     Patient states Silk Tape works the best       Medications:      Current Facility-Administered Medications   Medication Dose Route Frequency Provider Last Rate Last Admin    vancomycin (VANCOCIN) intermittent dosing (placeholder)   Other RX Placeholder Selvin Kim MD        vancomycin (VANCOCIN) 500 mg in dextrose 5 % 100 mL IVPB (mini-bag)  500 mg IntraVENous Once Selvin Kim MD        lactobacillus acidophilus [facility]) 4 tablet  4 tablet Oral TID Jose Ferreira MD   4 tablet at 09/28/22 2016    dronabinol (MARINOL) capsule 2.5 mg  2.5 mg Oral BID Jose Ferreira MD   2.5 mg at 09/28/22 2016    docusate sodium (COLACE) capsule 100 mg  100 mg Oral BID Jose Ferreira MD   100 mg at 09/28/22 0924    [Held by provider] senna (SENOKOT) tablet 17.2 mg  2 tablet Oral Nightly Chris Graciela Lowery MD   17.2 mg at 09/27/22 2014    [Held by provider] polyethylene glycol (GLYCOLAX) packet 17 g  17 g Oral Daily Rafiq Anderson MD   17 g at 09/28/22 1347    0.9 % sodium chloride infusion   IntraVENous Continuous Nicoletto Bolzan-Roche, APRN - CNP 50 mL/hr at 09/29/22 0122 New Bag at 09/29/22 0122    sodium chloride flush 0.9 % injection 5-40 mL  5-40 mL IntraVENous 2 times per day Nicoletto Bolzan-Roche, APRN - CNP   10 mL at 09/27/22 2015    sodium chloride flush 0.9 % injection 5-40 mL  5-40 mL IntraVENous PRN Nicoletto Bolzan-Roche, APRN - CNP        0.9 % sodium chloride infusion   IntraVENous PRN Nicoletto Bolzan-Roche, APRN - CNP        heparin (porcine) injection 5,000 Units  5,000 Units SubCUTAneous BID Nicoletto Bolzan-Roche, APRN - CNP   5,000 Units at 09/28/22 2016    ondansetron (ZOFRAN-ODT) disintegrating tablet 4 mg  4 mg Oral Q8H PRN Nicoletto Bolzan-Roche, APRN - CNP        Or    ondansetron (ZOFRAN) injection 4 mg  4 mg IntraVENous Q6H PRN Nicoletto Bolzan-Roche, APRN - CNP        polyethylene glycol (GLYCOLAX) packet 17 g  17 g Oral Daily PRN Nicoletto Bolzan-Roche, APRN - CNP        acetaminophen (TYLENOL) tablet 650 mg  650 mg Oral Q6H PRN Nicoletto Bolzan-Roche, APRN - CNP   650 mg at 09/28/22 0344    Or    acetaminophen (TYLENOL) suppository 650 mg  650 mg Rectal Q6H PRN Nicoletto Bolzan-Roche, APRN - CNP        cefTRIAXone (ROCEPHIN) 1,000 mg in dextrose 5 % 50 mL IVPB mini-bag  1,000 mg IntraVENous Q24H Varghese Paiz MD   Stopped at 09/29/22 0602    azithromycin (ZITHROMAX) 500 mg in D5W 250ml addavial  500 mg IntraVENous Q24H Varghese Paiz  mL/hr at 09/29/22 0607 500 mg at 09/29/22 0607    clopidogrel (PLAVIX) tablet 75 mg  75 mg Oral Daily Rafiq Anderson MD   75 mg at 09/28/22 0924    lipase-protease-amylase (CREON) delayed release capsule 24,000 Units  24,000 Units Oral Daily Rafiq Anderson MD   24,000 Units at 09/28/22 0924    [Held by provider] losartan (COZAAR) tablet 50 mg  50 mg Oral Daily Rony Hill MD   50 mg at 09/26/22 1451    mirtazapine (REMERON) tablet 15 mg  15 mg Oral Nightly Rony Hill MD   15 mg at 09/28/22 2016    propranolol (INDERAL) tablet 40 mg  40 mg Oral BID Rony Hill MD   40 mg at 09/28/22 2015       History:       PMHx:  Past Medical History:   Diagnosis Date    DEION (acute kidney injury) ([facility] 8/13/2022    Bowel disease     Cataract     Chronic back pain     Chronic renal disease, stage III [facility]) [996006] 5/13/2022    COPD (chronic obstructive pulmonary disease) ([facility])     Dementia ([facility])     Dementia with behavioral disturbance, unspecified dementia type ([facility] 3/11/2022    Dizziness and giddiness     GERD (gastroesophageal reflux disease)     Glaucoma     Hypertension     Osteoarthritis     Pancreatitis     Seizures ([facility])     TIA (transient ischemic attack)        PSHx:  Past Surgical History:   Procedure Laterality Date    APPENDECTOMY      CHOLECYSTECTOMY      INTESTINAL BYPASS      jejunoileal       Social Hx:  Social History     Socioeconomic History    Marital status:      Spouse name: None    Number of children: None    Years of education: None    Highest education level: None   Tobacco Use    Smoking status: Every Day     Packs/day: 3.00     Years: 54.00     Pack years: 162.00     Types: Cigarettes    Smokeless tobacco: Never   Substance and Sexual Activity    Alcohol use: No     Alcohol/week: 0.0 standard drinks    Drug use: No    Sexual activity: Not Currently     Partners: Male     Comment:      Social Determinants of Health     Financial Resource Strain: Low Risk     Difficulty of Paying Living Expenses: Not hard at all   Food Insecurity: No Food Insecurity    Worried About Running Out of Food in the Last Year: Never true    Ran Out of Food in the Last Year: Never true   Transportation Needs: No Transportation Needs    Lack of Transportation (Medical): No    Lack of Transportation (Non-Medical): No       Family Hx:  Family History   Problem Relation Age of Onset    Arthritis Other     Heart Disease Other     High Blood Pressure Other     Kidney Disease Other     Other Other         respiratory problems    Breast Cancer Neg Hx     Colon Cancer Neg Hx     Diabetes Neg Hx     Cancer Neg Hx     Eclampsia Neg Hx     Ovarian Cancer Neg Hx     Hypertension Neg Hx      Labor Neg Hx     Spont Abortions Neg Hx     Stroke Neg Hx        LABS: Reviewed     CBC:  Lab Results   Component Value Date/Time    WBC 6.7 2022 05:45 AM    RBC 3.77 2022 05:45 AM    HGB 13.3 2022 05:45 AM    HCT 43.5 2022 05:45 AM    .4 2022 05:45 AM    MCH 35.2 2022 05:45 AM    MCHC 30.5 2022 05:45 AM    RDW 15.9 2022 05:45 AM     2022 05:45 AM    MPV 8.4 2014 03:14 PM     CBC with Differential:   Lab Results   Component Value Date/Time    WBC 6.7 2022 05:45 AM    RBC 3.77 2022 05:45 AM    HGB 13.3 2022 05:45 AM    HCT 43.5 2022 05:45 AM     2022 05:45 AM    .4 2022 05:45 AM    MCH 35.2 2022 05:45 AM    MCHC 30.5 2022 05:45 AM    RDW 15.9 2022 05:45 AM    BANDSPCT 2 2022 04:53 AM    LYMPHOPCT 10.4 2022 05:45 AM    MONOPCT 9.2 2022 05:45 AM    BASOPCT 0.1 2022 05:45 AM    MONOSABS 0.6 2022 05:45 AM    LYMPHSABS 0.7 2022 05:45 AM    EOSABS 0.0 2022 05:45 AM    BASOSABS 0.0 2022 05:45 AM     CMP:    Lab Results   Component Value Date/Time     2022 05:24 AM    K 3.0 2022 05:24 AM    K 5.1 2022 06:45 AM     2022 05:24 AM    CO2 9 2022 05:24 AM    BUN 36 2022 05:24 AM    CREATININE 1.88 2022 05:24 AM    GFRAA 31.4 2022 05:24 AM    LABGLOM 26.0 2022 05:24 AM    GLUCOSE 59 2022 05:24 AM    PROT 5.5 2022 05:24 AM    LABALBU 3.1 2022 05:24 AM    CALCIUM 8.5 2022 05:24 AM    BILITOT <0.2 2022 05:24 AM    ALKPHOS 58 09/29/2022 05:24 AM    AST 22 09/29/2022 05:24 AM    ALT 13 09/29/2022 05:24 AM     BMP:    Lab Results   Component Value Date/Time     09/29/2022 05:24 AM    K 3.0 09/29/2022 05:24 AM    K 5.1 08/16/2022 06:45 AM     09/29/2022 05:24 AM    CO2 9 09/29/2022 05:24 AM    BUN 36 09/29/2022 05:24 AM    LABALBU 3.1 09/29/2022 05:24 AM    CREATININE 1.88 09/29/2022 05:24 AM    CALCIUM 8.5 09/29/2022 05:24 AM    GFRAA 31.4 09/29/2022 05:24 AM    LABGLOM 26.0 09/29/2022 05:24 AM    GLUCOSE 59 09/29/2022 05:24 AM     TSH:   Lab Results   Component Value Date/Time    TSH 3.570 03/11/2022 03:17 PM     Vitamin B12 and Folate: No components found for: FOLIC,  C66  Urinalysis:   Lab Results   Component Value Date/Time    NITRU Negative 09/25/2022 11:38 PM    WBCUA 20-50 09/25/2022 11:38 PM    BACTERIA FEW 09/25/2022 11:38 PM    RBCUA 3-5 09/06/2022 02:37 PM    BLOODU Negative 09/25/2022 11:38 PM    SPECGRAV 1.020 09/25/2022 11:38 PM    GLUCOSEU Negative 09/25/2022 11:38 PM           FUNCTIONAL ADL´S:     Independent: [  ] Eating, [   ] Dressing, [   ] Transferring, [   ] Kezia Cortés, [   ] Jose Miguel Montague, [   ] Continence  Dependent   : [ x ] Eating, [  x ] Dressing, [  x ] Transferring, [ x  ] Toileting, [ x  ] Bathing, [  x ] Continence  W. assistant : [  ] Eating, [   ] Dressing, [   ] Transferring, [   ] Kezia Cortés, [   ] Jose Miguel Montague, [   ] Continence    Radiology: Reviewed      CT ABDOMEN PELVIS WO CONTRAST Additional Contrast? Oral    Result Date: 9/27/2022  EXAMINATION: CT OF THE ABDOMEN AND PELVIS WITHOUT CONTRAST 9/27/2022 11:08 am TECHNIQUE: CT of the abdomen and pelvis was performed without the administration of intravenous contrast. Multiplanar reformatted images are provided for review. Automated exposure control, iterative reconstruction, and/or weight based adjustment of the mA/kV was utilized to reduce the radiation dose to as low as reasonably achievable.  COMPARISON: 10/06/2014 HISTORY: ORDERING SYSTEM PROVIDED HISTORY: fever, weight loss, flank pain TECHNOLOGIST PROVIDED HISTORY: Reason for exam:->fever, weight loss, flank pain Additional Contrast?->Oral What reading provider will be dictating this exam?->CRC FINDINGS: Lower Chest: Limited evaluation of the lower lung fields demonstrates no evidence of focal infiltrate or consolidation. No evidence of acute cardiopulmonary disease is seen. Organs: The liver demonstrates unremarkable attenuation, no evidence of masses no evidence of intrahepatic biliary dilatation is seen. The gallbladder is surgically absent Low-attenuation visualize in the hilum of the liver most likely representing prominent hepatic bile ducts. The pancreas and spleen demonstrate no evidence of masses. The adrenal glands demonstrate unremarkable contours with no evidence of masses. Focal area of fluid attenuation visualized along the lateral aspect of the midpole of the right kidney suboptimally evaluated due to absence of oral and intravenous contrast material seen on coronal series 601, image 59. A soft tissue prominence visualized along the lateral aspect of the midpole of the left kidney measuring approximately 2.7 by 2.6 cm this is seen on coronal series 601, image 254. No evidence of hydronephrosis or hydroureter is seen. GI/Bowel: The stomach demonstrates the thick wall, prominent mucosal folds. Suboptimal evaluation of the small bowel loops. Mildly distended large bowel loops visualized there is extensive stool visualized most prominent in the rectosigmoid and in the markedly distended rectum that measures 9.0 by 10.4 cm the seen on axial series 2, image 69 will recommend clinical correlation for fecal impaction. .  No significant distention of the small and large bowel loops is visualized. The appendix is visualized and is unremarkable. Abundance of stool is visualized in the large bowel. Scattered diverticular disease visualized but no evidence of acute diverticulitis. Pelvis: The urinary bladder is distended. No evidence of urinary bladder masses. No evidence of free fluid in the pelvis. No evidence of pelvic mass is seen. Peritoneum/Retroperitoneum: No evidence of free fluid or air within the peritoneal cavity. Bones/Soft Tissues: No evidence of lytic or sclerotic bone lesion is seen. Extensive degenerative changes of the lumbar spine visualized. Abdominal wall soft tissues are unremarkable. Extensive atherosclerotic calcifications visualized. Fecal impaction, extensive residual stool is seen. Distended loops of large bowel. Irregularity in the contour of the kidneys, rule out renal masses. Extensive degenerative bone changes. Assessment and plan:      -Advance Care Planning  Discussed goals of care with Family. Explained in extensive detail nuances between full code, DNR CCA and DNR CC. Family  has made the decision to be:    DNR CCA  NO INTUBATION      -Goals of Care Discussion:  Disease process and goals of treatment were discussed in basic terms. Family goal is to optimize available comfort care measures to decrease hospitalizations and focus on comfort. We discussed the palliative care philosophy in light of those goals. We discussed all care options contingent on treatment response and QOL. Much active listening, presence, and emotional support were given. In the setting of advanced dementia, estimated Fast 7a, weight loss  of five pounds in one month, BMI 12, with protein calorie malnutrition, albumin 3.1, pneumonia, CKD 3, COPD [facility] is hospice eligible. Family is agreeable, offered freedom of choice, requested [facility] as that is who cared for their dad in the past.      While hospitalized treated for multiple medical problems including;   Altered mental status  Dementia  Pneumonia  Elevated troponin  DEION on CKD  Hypertension  GERD  COPD  Electronically signed by CHEVY Varner CNP on 9/29/2022 at 8:07 AM

## 2022-09-29 NOTE — PLAN OF CARE
Nutrition Problem #1: Severe malnutrition, In context of chronic illness  Intervention: Food and/or Nutrient Delivery: Continue Current Diet, Start Oral Nutrition Supplement  Nutritional  Goals: PO intake >50%. Weight gain.
Problem: Skin/Tissue Integrity  Goal: Absence of new skin breakdown  Description: 1. Monitor for areas of redness and/or skin breakdown  2. Assess vascular access sites hourly  3. Every 4-6 hours minimum:  Change oxygen saturation probe site  4. Every 4-6 hours:  If on nasal continuous positive airway pressure, respiratory therapy assess nares and determine need for appliance change or resting period.   9/28/2022 2200 by Stephan Boland RN  Outcome: Progressing     Problem: Safety - Adult  Goal: Free from fall injury  9/28/2022 2200 by Stephan Boland, RN  Outcome: Progressing
See OT evaluation for all goals and OT POC.  Electronically signed by Judge Gonzalo OT on 9/26/2022 at 11:45 AM
Maintains adequate nutritional intake  Outcome: Progressing  Goal: Establish and maintain optimal ostomy function  Outcome: Progressing     Problem: Genitourinary - Adult  Goal: Absence of urinary retention  Outcome: Progressing  Goal: Urinary catheter remains patent  Outcome: Progressing     Problem: Infection - Adult  Goal: Absence of infection at discharge  Outcome: Progressing  Goal: Absence of infection during hospitalization  Outcome: Progressing  Goal: Absence of fever/infection during anticipated neutropenic period  Outcome: Progressing     Problem: Metabolic/Fluid and Electrolytes - Adult  Goal: Electrolytes maintained within normal limits  Outcome: Progressing  Goal: Hemodynamic stability and optimal renal function maintained  Outcome: Progressing  Goal: Glucose maintained within prescribed range  Outcome: Progressing     Problem: Hematologic - Adult  Goal: Maintains hematologic stability  Outcome: Progressing

## 2022-09-29 NOTE — PROGRESS NOTES
Referral meeting with patient's children, Lena Giron and Fili Claire.  Discussed Hospice philosophy, levels of care and Care team.

## 2022-10-01 LAB
BLOOD CULTURE, ROUTINE: NORMAL
CULTURE, BLOOD 2: NORMAL
ORGANISM: ABNORMAL
WOUND/ABSCESS: ABNORMAL
WOUND/ABSCESS: ABNORMAL

## 2022-10-02 NOTE — PROGRESS NOTES
Physical Therapy  Facility/Department: [facility] MED SURG V300/M819-59  Physical Therapy Discharge      NAME: Orlando Pulido    : 1945 (82 y.o.)  MRN: [id number]    Account: [de-identified]  Gender: female      Patient has been discharged from acute care hospital. DC patient from current PT program.      Electronically signed by Troy Anthony PT on 10/2/22 at 3:26 PM EDT

## 2022-10-03 PROBLEM — F02.80 ALZHEIMER DISEASE (HCC): Status: ACTIVE | Noted: 2022-10-03

## 2022-10-03 PROBLEM — G30.9 ALZHEIMER DISEASE (HCC): Status: ACTIVE | Noted: 2022-01-01

## 2022-10-05 LAB — CULTURE, BLOOD 2: NORMAL

## 2022-10-15 NOTE — DISCHARGE SUMMARY
Hospital Medicine Discharge Summary    Sidra Valadez  :  [birth year]  MRN:  [id number]    Admit date:  2022  Discharge date:  22     Admitting Physician: Tori Hartmann MD  Primary Care Physician:  Sweetie Mejia MD      Discharge Diagnoses:    AMS    Chief Complaint   Patient presents with    Illness     Times four days pt not eating and drinking       Hospital Course:   Patient presented with AMS and UTI as well as constipation and DEION on CKD. Iwona had end stage dementia so family opted for hospice care. Exam on discharge:   BP (!) 112/94   Pulse 66   Temp 98.6 °F (37 °C) (Oral)   Resp 18   Ht 5' 4\" (1.626 m)   Wt 73 lb 12.8 oz (33.5 kg)   LMP  (LMP Unknown)   SpO2 99%   BMI 12.67 kg/m²   General appearance: No apparent distress, appears stated age and cooperative. HEENT:  Conjunctivae/corneas clear. Neck: Trachea midline. Respiratory:  Normal respiratory effort. Clear to auscultation  Cardiovascular: Regular rate and rhythm  Abdomen: Soft, non-tender, non-distended with normal bowel sounds. Musculoskeletal: No clubbing, cyanosis or edema bilaterally  Neuro: Non Focal.   Capillary Refill: Brisk,< 3 seconds   Peripheral Pulses: +2 palpable, equal bilaterally         Patient was seen by the following consultants   Consults:  IP CONSULT TO INFECTIOUS DISEASES  IP CONSULT TO DIETITIAN  IP CONSULT TO NEPHROLOGY  IP CONSULT TO PALLIATIVE CARE  IP CONSULT TO PALLIATIVE CARE  PHARMACY TO DOSE VANCOMYCIN  IP CONSULT TO HOSPICE  IP CONSULT TO HOSPICE    Significant Diagnostic Studies:    Refer to chart     Please refer to chart if no studies are shown here    No results found.     Discharge Medications:         Medication List        ASK your doctor about these medications      albuterol sulfate  (90 Base) MCG/ACT inhaler  Commonly known as: Ventolin HFA  USE 2 INHALATIONS EVERY 6 HOURS AS NEEDED FOR WHEEZING     clonazePAM 0.5 MG tablet  Commonly known as: KLONOPIN  TAKE 1 TABLET BY MOUTH EVERY NIGHT AS NEEDED FOR ANXIETY     clopidogrel 75 MG tablet  Commonly known as: PLAVIX  TAKE 1 TABLET DAILY     clotrimazole-betamethasone 1-0.05 % cream  Commonly known as: LOTRISONE  APPLY TOPICALLY TWICE A DAY     Creon 16222-65727 units delayed release capsule  Generic drug: lipase-protease-amylase  TAKE 1 CAPSULE TWICE A DAY     diphenoxylate-atropine 2.5-0.025 MG per tablet  Commonly known as: Diphenatol  Take 1 tablet by mouth 4 times daily as needed for Diarrhea for up to 10 days. Ask about: Should I take this medication?     famotidine 20 MG tablet  Commonly known as: PEPCID     losartan 50 MG tablet  Commonly known as: COZAAR  TAKE 1 TABLET DAILY     Magnesium Oxide 400 MG Caps  Take 400 mg by mouth 3 times daily     metroNIDAZOLE 500 MG tablet  Commonly known as: FLAGYL  TAKE 1 TABLET THREE TIMES A DAY FOR 10 DAYS PER MONTH     mirtazapine 15 MG tablet  Commonly known as: REMERON  TAKE 1 TABLET EVERY NIGHT     Multivitamin Women 50+ Tabs  TAKE 1 TABLET BY MOUTH EVERY DAY  Ask about: Which instructions should I use? pantoprazole 40 MG tablet  Commonly known as: PROTONIX  TAKE 1 TABLET DAILY     propranolol 40 MG tablet  Commonly known as: INDERAL  TAKE 1 TABLET TWICE A DAY (REPLACES PREVIOUS PRESCRIPTION)     tiotropium-olodaterol 2.5-2.5 MCG/ACT Aers  Commonly known as: STIOLTO  Inhale 2 puffs into the lungs daily              Disposition:   If discharged to Home, Any [place] [id number] needs that were indicated and/or required as been addressed and set up by Social Work. Condition at discharge: good     Activity: activity as tolerated    Total time taken for discharging this patient: 40 minutes. Greater than 70% of time was spent focused exclusively on this patient. Time was taken to review chart, discuss plans with consultants, reconciling medications, discussing plan answering questions with patient.      Signed:  Yulia Pace MD  10/15/2022, 4:44 PM  ----------------------------------------------------------------------------------------------------------------------    Sharon Barros,

## 2022-10-28 PROBLEM — Z51.5 HOSPICE CARE: Status: ACTIVE | Noted: 2022-10-28

## 2023-10-03 PROBLEM — F03.918 DEMENTIA WITH BEHAVIORAL DISTURBANCE (HCC): Status: ACTIVE | Noted: 2022-01-01
